# Patient Record
Sex: MALE | Race: WHITE | Employment: OTHER | RURAL
[De-identification: names, ages, dates, MRNs, and addresses within clinical notes are randomized per-mention and may not be internally consistent; named-entity substitution may affect disease eponyms.]

---

## 2017-01-18 ENCOUNTER — HOSPITAL ENCOUNTER (OUTPATIENT)
Dept: MRI IMAGING | Age: 57
Discharge: HOME | End: 2017-01-18
Attending: NURSE PRACTITIONER | Admitting: NURSE PRACTITIONER

## 2017-01-18 DIAGNOSIS — M51.36 DDD (DEGENERATIVE DISC DISEASE), LUMBAR: ICD-10-CM

## 2018-11-24 ENCOUNTER — HOSPITAL ENCOUNTER (EMERGENCY)
Facility: HOSPITAL | Age: 58
Discharge: HOME | End: 2018-11-24
Attending: EMERGENCY MEDICINE
Payer: MEDICAID

## 2018-11-24 VITALS
DIASTOLIC BLOOD PRESSURE: 73 MMHG | OXYGEN SATURATION: 94 % | HEIGHT: 63 IN | RESPIRATION RATE: 17 BRPM | BODY MASS INDEX: 39.87 KG/M2 | WEIGHT: 225 LBS | SYSTOLIC BLOOD PRESSURE: 145 MMHG | TEMPERATURE: 97.7 F | HEART RATE: 88 BPM

## 2018-11-24 DIAGNOSIS — E16.2 HYPOGLYCEMIA: Primary | ICD-10-CM

## 2018-11-24 LAB
ANION GAP SERPL CALCULATED.3IONS-SCNC: 12 MMOL/L (ref 3–16)
BASOPHILS ABSOLUTE: 0 K/UL (ref 0–0.1)
BASOPHILS RELATIVE PERCENT: 0.3 %
BUN BLDV-MCNC: 17 MG/DL (ref 6–20)
CALCIUM SERPL-MCNC: 10.5 MG/DL (ref 8.5–10.5)
CHLORIDE BLD-SCNC: 104 MMOL/L (ref 98–107)
CO2: 24 MMOL/L (ref 20–30)
CREAT SERPL-MCNC: 1.3 MG/DL (ref 0.4–1.2)
EOSINOPHILS ABSOLUTE: 0 K/UL (ref 0–0.4)
EOSINOPHILS RELATIVE PERCENT: 0.4 %
GFR AFRICAN AMERICAN: >59
GFR NON-AFRICAN AMERICAN: 57
GLUCOSE BLD-MCNC: 100 MG/DL (ref 74–106)
GLUCOSE BLD-MCNC: 57 MG/DL (ref 74–106)
GLUCOSE BLD-MCNC: 63 MG/DL (ref 74–106)
GLUCOSE BLD-MCNC: 70 MG/DL (ref 74–106)
GLUCOSE BLD-MCNC: 86 MG/DL (ref 74–106)
HCT VFR BLD CALC: 41.5 % (ref 40–54)
HEMOGLOBIN: 13.4 G/DL (ref 13–18)
IMMATURE GRANULOCYTES #: 0 K/UL
IMMATURE GRANULOCYTES %: 0.4 % (ref 0–5)
LYMPHOCYTES ABSOLUTE: 1.8 K/UL (ref 1.5–4)
LYMPHOCYTES RELATIVE PERCENT: 23.7 %
MCH RBC QN AUTO: 30.5 PG (ref 27–32)
MCHC RBC AUTO-ENTMCNC: 32.3 G/DL (ref 31–35)
MCV RBC AUTO: 94.5 FL (ref 80–100)
MONOCYTES ABSOLUTE: 0.6 K/UL (ref 0.2–0.8)
MONOCYTES RELATIVE PERCENT: 7.3 %
NEUTROPHILS ABSOLUTE: 5.1 K/UL (ref 2–7.5)
NEUTROPHILS RELATIVE PERCENT: 67.9 %
PDW BLD-RTO: 13.8 % (ref 11–16)
PERFORMED ON: ABNORMAL
PERFORMED ON: ABNORMAL
PERFORMED ON: NORMAL
PERFORMED ON: NORMAL
PLATELET # BLD: 462 K/UL (ref 150–400)
PMV BLD AUTO: 8 FL (ref 6–10)
POTASSIUM SERPL-SCNC: 4.6 MMOL/L (ref 3.4–5.1)
RBC # BLD: 4.39 M/UL (ref 4.5–6)
SODIUM BLD-SCNC: 140 MMOL/L (ref 136–145)
WBC # BLD: 7.5 K/UL (ref 4–11)

## 2018-11-24 PROCEDURE — 80048 BASIC METABOLIC PNL TOTAL CA: CPT

## 2018-11-24 PROCEDURE — 96365 THER/PROPH/DIAG IV INF INIT: CPT

## 2018-11-24 PROCEDURE — 85025 COMPLETE CBC W/AUTO DIFF WBC: CPT

## 2018-11-24 PROCEDURE — 36415 COLL VENOUS BLD VENIPUNCTURE: CPT

## 2018-11-24 PROCEDURE — 99284 EMERGENCY DEPT VISIT MOD MDM: CPT

## 2018-11-24 PROCEDURE — 2580000003 HC RX 258: Performed by: EMERGENCY MEDICINE

## 2018-11-24 RX ORDER — DEXTROSE AND SODIUM CHLORIDE 5; .9 G/100ML; G/100ML
INJECTION, SOLUTION INTRAVENOUS CONTINUOUS
Status: DISCONTINUED | OUTPATIENT
Start: 2018-11-24 | End: 2018-11-25 | Stop reason: HOSPADM

## 2018-11-24 RX ORDER — SERTRALINE HYDROCHLORIDE 100 MG/1
100 TABLET, FILM COATED ORAL DAILY
COMMUNITY
End: 2022-09-08 | Stop reason: DRUGHIGH

## 2018-11-24 RX ORDER — TIZANIDINE 4 MG/1
4 TABLET ORAL 2 TIMES DAILY PRN
Status: ON HOLD | COMMUNITY
End: 2021-02-17 | Stop reason: HOSPADM

## 2018-11-24 RX ORDER — FENOFIBRATE 160 MG/1
160 TABLET ORAL DAILY
COMMUNITY
End: 2022-09-08

## 2018-11-24 RX ORDER — QUETIAPINE FUMARATE 50 MG/1
200 TABLET, EXTENDED RELEASE ORAL NIGHTLY
Status: ON HOLD | COMMUNITY
End: 2021-02-17 | Stop reason: HOSPADM

## 2018-11-24 RX ORDER — PANTOPRAZOLE SODIUM 40 MG/1
40 GRANULE, DELAYED RELEASE ORAL
COMMUNITY
End: 2022-09-08 | Stop reason: SDUPTHER

## 2018-11-24 RX ORDER — ATORVASTATIN CALCIUM 80 MG/1
80 TABLET, FILM COATED ORAL DAILY
COMMUNITY
End: 2022-09-08 | Stop reason: SDUPTHER

## 2018-11-24 RX ORDER — LISINOPRIL 10 MG/1
10 TABLET ORAL DAILY
Status: ON HOLD | COMMUNITY
End: 2021-02-17 | Stop reason: HOSPADM

## 2018-11-24 RX ORDER — GLIPIZIDE 10 MG/1
10 TABLET ORAL
COMMUNITY
End: 2022-09-08 | Stop reason: SDUPTHER

## 2018-11-24 RX ADMIN — DEXTROSE AND SODIUM CHLORIDE: 5; 900 INJECTION, SOLUTION INTRAVENOUS at 23:04

## 2018-11-25 NOTE — ED PROVIDER NOTES
[address] [facility] [facility] [address]  eMERGENCY dEPARTMENT eNCOUnter      Pt Name: Violetta Cooper  MRN: [id number]  YOB: 1960  Date ofevaluation: 74/50/3460  Provider: Sherri Vicente MD    CHIEF COMPLAINT       Chief Complaint   Patient presents with    Hypoglycemia         HISTORY OF PRESENT ILLNESS  (Location/Symptom, Timing/Onset, Context/Setting, Quality, Duration, Modifying Factors, Severity.)   Violetta Cooper is a 62 y.o. male who presents to the emergency department with hypoglycemia. He's been feeling \"overly tired\" today and just not feeling well. His niece started checking his blood sugars and noticed they were low. They were 55, 53, 60, 53 at home. He's on Glipizide and Metformin. This is very unusual for him as he tends to run 130's-140's. Nursing notes were reviewed. REVIEW OF SYSTEMS    (2-9systems for level 4, 10 or more for level 5)   ROS:  General:  No fevers, no chills, + weakness  HEENT: No sore throat, runny nose or ear pain  Cardiovascular:  No chest pain, no palpitations  Respiratory:  No shortnessof breath, no cough, no wheezing  Gastrointestinal:  No pain, no nausea, no vomiting, no diarrhea  Musculoskeletal:  No muscle pain, no joint pain  Skin:  No rash, no easy bruising  Genitourinary:  No dysuria, nohematuria    Except as noted above theremainder of the review of systems was reviewed and negative.        PASTMEDICAL HISTORY     Past Medical History:   Diagnosis Date    [facility] ([facility]     Diabetes mellitus ([facility]     Hyperlipidemia          SURGICAL HISTORY       Past Surgical History:   Procedure Laterality Date    CHOLECYSTECTOMY           CURRENT MEDICATIONS       Previous Medications    ASPIRIN 81 MG TABLET    Take 81 mg by mouth daily    ATORVASTATIN (LIPITOR) 80 MG TABLET    Take 80 mg by mouth daily    EMPAGLIFLOZIN (JARDIANCE) 10 MG TABLET    Take 10 mg by mouth daily    FENOFIBRATE 160 MG TABLET    Take 160 mg by mouth daily

## 2018-11-25 NOTE — ED TRIAGE NOTES
Pts family states that pt has had hypoglycemia all day. Family states pt typically has high glucose levels due to uncontrolled diabetes mellitus 2. Family was concerned when pts glucose was low today.

## 2018-11-25 NOTE — ED NOTES
Discharge instructions reviewed with patient with his verbalized understanding. Patient left ambulatory refusing wheelchair. Repeat FSBS 100.      Jenelle Mercedes RN  11/24/18 6912

## 2020-03-19 ENCOUNTER — HOSPITAL ENCOUNTER (OUTPATIENT)
Dept: ULTRASOUND IMAGING | Facility: HOSPITAL | Age: 60
Discharge: HOME | End: 2020-03-19
Payer: MEDICAID

## 2020-03-19 PROCEDURE — 93925 LOWER EXTREMITY STUDY: CPT

## 2021-02-06 ENCOUNTER — HOSPITAL ENCOUNTER (INPATIENT)
Facility: HOSPITAL | Age: 61
LOS: 3 days | Discharge: NURSING, REHAB OR LONG-TERM CARE | DRG: 682 | End: 2021-02-09
Attending: EMERGENCY MEDICINE | Admitting: INTERNAL MEDICINE
Payer: MEDICAID

## 2021-02-06 ENCOUNTER — APPOINTMENT (OUTPATIENT)
Dept: CT IMAGING | Facility: HOSPITAL | Age: 61
DRG: 682 | End: 2021-02-06
Payer: MEDICAID

## 2021-02-06 DIAGNOSIS — J96.02 ACUTE RESPIRATORY FAILURE WITH HYPOXIA AND HYPERCAPNIA (HCC): Primary | ICD-10-CM

## 2021-02-06 DIAGNOSIS — G93.41 ACUTE METABOLIC ENCEPHALOPATHY: ICD-10-CM

## 2021-02-06 DIAGNOSIS — Z01.84 COVID-19 VIRUS ANTIBODY NEGATIVE: ICD-10-CM

## 2021-02-06 DIAGNOSIS — J96.01 ACUTE RESPIRATORY FAILURE WITH HYPOXIA AND HYPERCAPNIA (HCC): Primary | ICD-10-CM

## 2021-02-06 DIAGNOSIS — R74.8 ELEVATED CK: ICD-10-CM

## 2021-02-06 DIAGNOSIS — J18.9 PNEUMONIA DUE TO ORGANISM: ICD-10-CM

## 2021-02-06 PROBLEM — G93.40 ACUTE ENCEPHALOPATHY: Status: ACTIVE | Noted: 2021-02-06

## 2021-02-06 LAB
A/G RATIO: 1.3 (ref 0.8–2)
ALBUMIN SERPL-MCNC: 3.9 G/DL (ref 3.4–4.8)
ALP BLD-CCNC: 46 U/L (ref 25–100)
ALT SERPL-CCNC: 19 U/L (ref 4–36)
AMMONIA: 30 MCG/DL (ref 27–102)
ANION GAP SERPL CALCULATED.3IONS-SCNC: 9 MMOL/L (ref 3–16)
AST SERPL-CCNC: 41 U/L (ref 8–33)
BACTERIA: ABNORMAL /HPF
BASE EXCESS ARTERIAL: 3.5 MMOL/L (ref -3–3)
BASE EXCESS VENOUS: 3.4 MMOL/L (ref -3–3)
BASOPHILS ABSOLUTE: 0 K/UL (ref 0–0.1)
BASOPHILS RELATIVE PERCENT: 0.2 %
BILIRUB SERPL-MCNC: 0.4 MG/DL (ref 0.3–1.2)
BILIRUBIN URINE: ABNORMAL
BLOOD, URINE: ABNORMAL
BUN BLDV-MCNC: 23 MG/DL (ref 6–20)
CALCIUM SERPL-MCNC: 9.7 MG/DL (ref 8.5–10.5)
CHLORIDE BLD-SCNC: 98 MMOL/L (ref 98–107)
CLARITY: ABNORMAL
CO2: 31 MMOL/L (ref 20–30)
COLOR: YELLOW
CREAT SERPL-MCNC: 1.7 MG/DL (ref 0.4–1.2)
EOSINOPHILS ABSOLUTE: 0 K/UL (ref 0–0.4)
EOSINOPHILS RELATIVE PERCENT: 0.2 %
EPITHELIAL CELLS, UA: ABNORMAL /HPF (ref 0–5)
FIO2: 0.28 %
GFR AFRICAN AMERICAN: 50
GFR NON-AFRICAN AMERICAN: 41
GLOBULIN: 2.9 G/DL
GLUCOSE BLD-MCNC: 153 MG/DL (ref 74–106)
GLUCOSE BLD-MCNC: 188 MG/DL (ref 74–106)
GLUCOSE URINE: >=1000 MG/DL
HCO3 ARTERIAL: 30.6 MMOL/L (ref 22–26)
HCO3 VENOUS: 31.5 MMOL/L (ref 23–29)
HCT VFR BLD CALC: 44.2 % (ref 40–54)
HEMOGLOBIN: 13.7 G/DL (ref 13–18)
IMMATURE GRANULOCYTES #: 0 K/UL
IMMATURE GRANULOCYTES %: 0.4 % (ref 0–5)
INR BLD: 0.97 (ref 0.88–1.11)
KETONES, URINE: ABNORMAL MG/DL
LACTIC ACID: 1.8 MMOL/L (ref 0.4–2)
LEUKOCYTE ESTERASE, URINE: ABNORMAL
LYMPHOCYTES ABSOLUTE: 1.8 K/UL (ref 1.5–4)
LYMPHOCYTES RELATIVE PERCENT: 17.2 %
MCH RBC QN AUTO: 29.9 PG (ref 27–32)
MCHC RBC AUTO-ENTMCNC: 31 G/DL (ref 31–35)
MCV RBC AUTO: 96.5 FL (ref 80–100)
MICROSCOPIC EXAMINATION: YES
MONOCYTES ABSOLUTE: 1 K/UL (ref 0.2–0.8)
MONOCYTES RELATIVE PERCENT: 9.1 %
NEUTROPHILS ABSOLUTE: 7.7 K/UL (ref 2–7.5)
NEUTROPHILS RELATIVE PERCENT: 72.9 %
NITRITE, URINE: NEGATIVE
O2 SAT, ARTERIAL: 96.7 %
O2 SAT, VEN: ABNORMAL %
O2 THERAPY: ABNORMAL
O2 THERAPY: ABNORMAL
PCO2 ARTERIAL: 58.7 MMHG (ref 35–45)
PCO2, VEN: 64.8 MMHG (ref 40–50)
PDW BLD-RTO: 13.2 % (ref 11–16)
PERFORMED ON: ABNORMAL
PH ARTERIAL: 7.33 (ref 7.35–7.45)
PH UA: 5.5 (ref 5–8)
PH VENOUS: 7.31 (ref 7.35–7.45)
PLATELET # BLD: 320 K/UL (ref 150–400)
PMV BLD AUTO: 8.7 FL (ref 6–10)
PO2 ARTERIAL: 95.7 MMHG (ref 80–100)
PO2, VEN: 33.7 MMHG (ref 25–40)
POTASSIUM REFLEX MAGNESIUM: 5.3 MMOL/L (ref 3.4–5.1)
PRO-BNP: 838 PG/ML (ref 0–1800)
PROTEIN UA: 30 MG/DL
PROTHROMBIN TIME: 12.4 SEC (ref 11.6–13.8)
RAPID INFLUENZA  B AGN: NEGATIVE
RAPID INFLUENZA A AGN: NEGATIVE
RBC # BLD: 4.58 M/UL (ref 4.5–6)
RBC UA: ABNORMAL /HPF (ref 0–4)
SARS-COV-2, NAAT: NOT DETECTED
SODIUM BLD-SCNC: 138 MMOL/L (ref 136–145)
SPECIFIC GRAVITY UA: 1.02 (ref 1–1.03)
TCO2 ARTERIAL: 32.4 MMOL/L (ref 24–30)
TCO2 CALC VENOUS: 34 MMOL/L
TOTAL CK: 4029 U/L (ref 26–174)
TOTAL PROTEIN: 6.8 G/DL (ref 6.4–8.3)
TROPONIN: <0.3 NG/ML
URINE REFLEX TO CULTURE: ABNORMAL
URINE TYPE: ABNORMAL
UROBILINOGEN, URINE: 0.2 E.U./DL
VALPROIC ACID LEVEL: 10 UG/ML (ref 50–100)
WBC # BLD: 10.5 K/UL (ref 4–11)
WBC UA: ABNORMAL /HPF (ref 0–5)
YEAST: PRESENT /HPF

## 2021-02-06 PROCEDURE — 70450 CT HEAD/BRAIN W/O DYE: CPT

## 2021-02-06 PROCEDURE — 94640 AIRWAY INHALATION TREATMENT: CPT

## 2021-02-06 PROCEDURE — 81001 URINALYSIS AUTO W/SCOPE: CPT

## 2021-02-06 PROCEDURE — 87804 INFLUENZA ASSAY W/OPTIC: CPT

## 2021-02-06 PROCEDURE — 2580000003 HC RX 258: Performed by: EMERGENCY MEDICINE

## 2021-02-06 PROCEDURE — 94660 CPAP INITIATION&MGMT: CPT

## 2021-02-06 PROCEDURE — 6360000002 HC RX W HCPCS: Performed by: EMERGENCY MEDICINE

## 2021-02-06 PROCEDURE — 1200000000 HC SEMI PRIVATE

## 2021-02-06 PROCEDURE — 85610 PROTHROMBIN TIME: CPT

## 2021-02-06 PROCEDURE — 96375 TX/PRO/DX INJ NEW DRUG ADDON: CPT

## 2021-02-06 PROCEDURE — 82140 ASSAY OF AMMONIA: CPT

## 2021-02-06 PROCEDURE — 99285 EMERGENCY DEPT VISIT HI MDM: CPT

## 2021-02-06 PROCEDURE — 87040 BLOOD CULTURE FOR BACTERIA: CPT

## 2021-02-06 PROCEDURE — 85025 COMPLETE CBC W/AUTO DIFF WBC: CPT

## 2021-02-06 PROCEDURE — 82550 ASSAY OF CK (CPK): CPT

## 2021-02-06 PROCEDURE — 36600 WITHDRAWAL OF ARTERIAL BLOOD: CPT

## 2021-02-06 PROCEDURE — 6370000000 HC RX 637 (ALT 250 FOR IP): Performed by: EMERGENCY MEDICINE

## 2021-02-06 PROCEDURE — 96365 THER/PROPH/DIAG IV INF INIT: CPT

## 2021-02-06 PROCEDURE — 36415 COLL VENOUS BLD VENIPUNCTURE: CPT

## 2021-02-06 PROCEDURE — 80053 COMPREHEN METABOLIC PANEL: CPT

## 2021-02-06 PROCEDURE — 84484 ASSAY OF TROPONIN QUANT: CPT

## 2021-02-06 PROCEDURE — 6370000000 HC RX 637 (ALT 250 FOR IP): Performed by: PHYSICIAN ASSISTANT

## 2021-02-06 PROCEDURE — 6360000002 HC RX W HCPCS: Performed by: PHYSICIAN ASSISTANT

## 2021-02-06 PROCEDURE — 74176 CT ABD & PELVIS W/O CONTRAST: CPT

## 2021-02-06 PROCEDURE — 71250 CT THORAX DX C-: CPT

## 2021-02-06 PROCEDURE — 82803 BLOOD GASES ANY COMBINATION: CPT

## 2021-02-06 PROCEDURE — 94761 N-INVAS EAR/PLS OXIMETRY MLT: CPT

## 2021-02-06 PROCEDURE — U0002 COVID-19 LAB TEST NON-CDC: HCPCS

## 2021-02-06 PROCEDURE — 80164 ASSAY DIPROPYLACETIC ACD TOT: CPT

## 2021-02-06 PROCEDURE — 83880 ASSAY OF NATRIURETIC PEPTIDE: CPT

## 2021-02-06 PROCEDURE — 93005 ELECTROCARDIOGRAM TRACING: CPT

## 2021-02-06 PROCEDURE — 83605 ASSAY OF LACTIC ACID: CPT

## 2021-02-06 RX ORDER — IPRATROPIUM BROMIDE AND ALBUTEROL SULFATE 2.5; .5 MG/3ML; MG/3ML
1 SOLUTION RESPIRATORY (INHALATION)
Status: DISCONTINUED | OUTPATIENT
Start: 2021-02-06 | End: 2021-02-09 | Stop reason: HOSPADM

## 2021-02-06 RX ORDER — IPRATROPIUM BROMIDE AND ALBUTEROL SULFATE 2.5; .5 MG/3ML; MG/3ML
1 SOLUTION RESPIRATORY (INHALATION) ONCE
Status: COMPLETED | OUTPATIENT
Start: 2021-02-06 | End: 2021-02-06

## 2021-02-06 RX ORDER — ACETAMINOPHEN 650 MG/1
650 SUPPOSITORY RECTAL EVERY 6 HOURS PRN
Status: DISCONTINUED | OUTPATIENT
Start: 2021-02-06 | End: 2021-02-09 | Stop reason: HOSPADM

## 2021-02-06 RX ORDER — ATORVASTATIN CALCIUM 80 MG/1
80 TABLET, FILM COATED ORAL DAILY
Status: DISCONTINUED | OUTPATIENT
Start: 2021-02-06 | End: 2021-02-09 | Stop reason: HOSPADM

## 2021-02-06 RX ORDER — PROPRANOLOL HYDROCHLORIDE 40 MG/1
60 TABLET ORAL 2 TIMES DAILY
Status: ON HOLD | COMMUNITY
End: 2021-02-17 | Stop reason: HOSPADM

## 2021-02-06 RX ORDER — GUAIFENESIN 600 MG/1
1200 TABLET, EXTENDED RELEASE ORAL 2 TIMES DAILY
Status: ON HOLD | COMMUNITY
End: 2021-02-17 | Stop reason: HOSPADM

## 2021-02-06 RX ORDER — SUCRALFATE 1 G/1
1 TABLET ORAL 3 TIMES DAILY
Status: DISCONTINUED | OUTPATIENT
Start: 2021-02-06 | End: 2021-02-09 | Stop reason: HOSPADM

## 2021-02-06 RX ORDER — SUCRALFATE 1 G/1
1 TABLET ORAL 3 TIMES DAILY
COMMUNITY
End: 2022-09-08 | Stop reason: SDUPTHER

## 2021-02-06 RX ORDER — AZITHROMYCIN 250 MG/1
250 TABLET, FILM COATED ORAL DAILY
Status: DISCONTINUED | OUTPATIENT
Start: 2021-02-07 | End: 2021-02-09 | Stop reason: HOSPADM

## 2021-02-06 RX ORDER — PROMETHAZINE HYDROCHLORIDE 25 MG/1
12.5 TABLET ORAL EVERY 6 HOURS PRN
Status: DISCONTINUED | OUTPATIENT
Start: 2021-02-06 | End: 2021-02-09 | Stop reason: HOSPADM

## 2021-02-06 RX ORDER — RISPERIDONE 0.5 MG/1
0.5 TABLET, FILM COATED ORAL 2 TIMES DAILY
Status: ON HOLD | COMMUNITY
End: 2021-02-17 | Stop reason: HOSPADM

## 2021-02-06 RX ORDER — DIVALPROEX SODIUM 250 MG/1
250 TABLET, EXTENDED RELEASE ORAL NIGHTLY
COMMUNITY
End: 2022-09-08 | Stop reason: SDUPTHER

## 2021-02-06 RX ORDER — ACETAMINOPHEN 325 MG/1
650 TABLET ORAL EVERY 6 HOURS PRN
Status: DISCONTINUED | OUTPATIENT
Start: 2021-02-06 | End: 2021-02-09 | Stop reason: HOSPADM

## 2021-02-06 RX ORDER — ASPIRIN 81 MG/1
81 TABLET, CHEWABLE ORAL DAILY
Status: DISCONTINUED | OUTPATIENT
Start: 2021-02-06 | End: 2021-02-09 | Stop reason: HOSPADM

## 2021-02-06 RX ORDER — ONDANSETRON 2 MG/ML
4 INJECTION INTRAMUSCULAR; INTRAVENOUS EVERY 6 HOURS PRN
Status: DISCONTINUED | OUTPATIENT
Start: 2021-02-06 | End: 2021-02-09 | Stop reason: HOSPADM

## 2021-02-06 RX ORDER — PROPRANOLOL HYDROCHLORIDE 20 MG/1
60 TABLET ORAL 2 TIMES DAILY
Status: DISCONTINUED | OUTPATIENT
Start: 2021-02-06 | End: 2021-02-09 | Stop reason: HOSPADM

## 2021-02-06 RX ORDER — POLYETHYLENE GLYCOL 3350 17 G/17G
17 POWDER, FOR SOLUTION ORAL DAILY PRN
Status: DISCONTINUED | OUTPATIENT
Start: 2021-02-06 | End: 2021-02-09 | Stop reason: HOSPADM

## 2021-02-06 RX ORDER — GUAIFENESIN 600 MG/1
1200 TABLET, EXTENDED RELEASE ORAL 2 TIMES DAILY
Status: DISCONTINUED | OUTPATIENT
Start: 2021-02-06 | End: 2021-02-09 | Stop reason: HOSPADM

## 2021-02-06 RX ORDER — DIVALPROEX SODIUM 250 MG/1
250 TABLET, EXTENDED RELEASE ORAL NIGHTLY
Status: DISCONTINUED | OUTPATIENT
Start: 2021-02-06 | End: 2021-02-09 | Stop reason: HOSPADM

## 2021-02-06 RX ORDER — 0.9 % SODIUM CHLORIDE 0.9 %
1000 INTRAVENOUS SOLUTION INTRAVENOUS ONCE
Status: COMPLETED | OUTPATIENT
Start: 2021-02-06 | End: 2021-02-06

## 2021-02-06 RX ORDER — METHYLPREDNISOLONE SODIUM SUCCINATE 125 MG/2ML
125 INJECTION, POWDER, LYOPHILIZED, FOR SOLUTION INTRAMUSCULAR; INTRAVENOUS ONCE
Status: COMPLETED | OUTPATIENT
Start: 2021-02-06 | End: 2021-02-06

## 2021-02-06 RX ORDER — DIAZEPAM 10 MG/1
10 TABLET ORAL EVERY 12 HOURS PRN
Status: ON HOLD | COMMUNITY
End: 2021-02-17 | Stop reason: HOSPADM

## 2021-02-06 RX ORDER — PANTOPRAZOLE SODIUM 40 MG/1
40 TABLET, DELAYED RELEASE ORAL
Status: DISCONTINUED | OUTPATIENT
Start: 2021-02-07 | End: 2021-02-09 | Stop reason: HOSPADM

## 2021-02-06 RX ORDER — TIZANIDINE 4 MG/1
4 TABLET ORAL 2 TIMES DAILY PRN
Status: DISCONTINUED | OUTPATIENT
Start: 2021-02-06 | End: 2021-02-09 | Stop reason: HOSPADM

## 2021-02-06 RX ADMIN — AZITHROMYCIN DIHYDRATE 500 MG: 500 INJECTION, POWDER, LYOPHILIZED, FOR SOLUTION INTRAVENOUS at 14:55

## 2021-02-06 RX ADMIN — IPRATROPIUM BROMIDE AND ALBUTEROL SULFATE 1 AMPULE: .5; 3 SOLUTION RESPIRATORY (INHALATION) at 14:11

## 2021-02-06 RX ADMIN — METHYLPREDNISOLONE SODIUM SUCCINATE 125 MG: 125 INJECTION, POWDER, FOR SOLUTION INTRAMUSCULAR; INTRAVENOUS at 14:19

## 2021-02-06 RX ADMIN — IPRATROPIUM BROMIDE AND ALBUTEROL SULFATE 1 AMPULE: .5; 3 SOLUTION RESPIRATORY (INHALATION) at 16:47

## 2021-02-06 RX ADMIN — CEFTRIAXONE 1000 MG: 1 INJECTION, POWDER, FOR SOLUTION INTRAMUSCULAR; INTRAVENOUS at 14:20

## 2021-02-06 RX ADMIN — ENOXAPARIN SODIUM 40 MG: 100 INJECTION SUBCUTANEOUS at 17:45

## 2021-02-06 RX ADMIN — SODIUM CHLORIDE 1000 ML: 9 INJECTION, SOLUTION INTRAVENOUS at 14:19

## 2021-02-06 RX ADMIN — IPRATROPIUM BROMIDE AND ALBUTEROL SULFATE 1 AMPULE: .5; 3 SOLUTION RESPIRATORY (INHALATION) at 21:00

## 2021-02-06 NOTE — ED NOTES
md at bedside at this time. Attempting urine sample. Pt does have iv access from ems. He states his name and birthday. He does think he is at [facility].  He does not know month or year.      Pierre Quintana RN  02/06/21 6140

## 2021-02-06 NOTE — ED PROVIDER NOTES
[address] [facility] ENCOUNTER      Pt Name: Daryl Fitzgerald  MRN: [id number]  [place] 1960  Date of evaluation: 2/6/2021  Provider: Sarah Feng DO    CHIEF COMPLAINT       Chief Complaint   Patient presents with    Altered Mental Status         HISTORY OF PRESENT ILLNESS   (Location/Symptom, Timing/Onset, Context/Setting, Quality, Duration, Modifying Factors, Severity)  Note limiting factors. Daryl Fitzgerald is a 61 y.o. male with a history of diabetes, hypertension, unknown cancer who presents to the emergency department with complaint of altered mental status and hypoxia. Patient is coming from home. Reportedly family called and states sometime yesterday he began acting altered and more lethargic than normal.  Today was sitting in feces and his own urine. No report of any trauma. Unsure how long he had been sitting there. History is limited as no family is here and was given by EMS. Unknown time of onset or last known well time. Patient is confused at this time and is A&O x2. He is unsure why he is here. He denies any complaints. Blood sugar was reportedly 400s at home but around 260 for EMS. He was also hypoxic to around 90% on room air and placed on 5 L nasal cannula with improvement. No focal deficits were reported. The last time he was here was in 2018 for hypoglycemia. Appropriate PPE was used including an eye shield, gloves, N95 mask, surgical mask during the entire patient encounter and exam.  If necessary (pt being intubated or aerosolizing equipment in use) a gown was also used. Nursing Notes were reviewed.       PAST MEDICAL HISTORY     Past Medical History:   Diagnosis Date    Cancer ([facility]     Diabetes mellitus ([facility]     Hyperlipidemia          SURGICAL HISTORY       Past Surgical History:   Procedure Laterality Date    CHOLECYSTECTOMY           CURRENT MEDICATIONS       Previous Medications    ASPIRIN 81 MG TABLET Take 81 mg by mouth daily    ATORVASTATIN (LIPITOR) 80 MG TABLET    Take 80 mg by mouth daily    DIAZEPAM (VALIUM) 10 MG TABLET    Take 10 mg by mouth every 12 hours as needed for Anxiety. DIVALPROEX (DEPAKOTE ER) 250 MG EXTENDED RELEASE TABLET    Take 250 mg by mouth nightly    EMPAGLIFLOZIN (JARDIANCE) 10 MG TABLET    Take 10 mg by mouth daily    FENOFIBRATE 160 MG TABLET    Take 160 mg by mouth daily    GLIPIZIDE (GLUCOTROL) 10 MG TABLET    Take 10 mg by mouth 2 times daily (before meals)    GUAIFENESIN (MUCINEX) 600 MG EXTENDED RELEASE TABLET    Take 1,200 mg by mouth 2 times daily    LINACLOTIDE (LINZESS) 72 MCG CAPS CAPSULE    Take 72 mcg by mouth every morning (before breakfast)    LISINOPRIL (PRINIVIL;ZESTRIL) 10 MG TABLET    Take 10 mg by mouth daily    METFORMIN (GLUCOPHAGE) 1000 MG TABLET    Take 1,000 mg by mouth 2 times daily (with meals)    PANTOPRAZOLE SODIUM (PROTONIX) 40 MG PACK PACKET    Take 40 mg by mouth every morning (before breakfast)    PROPRANOLOL (INDERAL) 40 MG TABLET    Take 60 mg by mouth 2 times daily    QUETIAPINE (SEROQUEL XR) 50 MG EXTENDED RELEASE TABLET    Take 200 mg by mouth nightly     RISPERIDONE (RISPERDAL) 0.5 MG TABLET    Take 0.5 mg by mouth 2 times daily    SERTRALINE (ZOLOFT) 100 MG TABLET    Take 100 mg by mouth daily    SITAGLIPTIN (JANUVIA) 100 MG TABLET    Take 100 mg by mouth daily    SUCRALFATE (CARAFATE) 1 GM TABLET    Take 1 g by mouth 3 times daily    TIZANIDINE (ZANAFLEX) 4 MG TABLET    Take 4 mg by mouth 2 times daily as needed        ALLERGIES     Patient has no known allergies. FAMILY HISTORY     History reviewed. No pertinent family history.        SOCIAL HISTORY       Social History     Socioeconomic History    Marital status: Single     Spouse name: None    Number of children: None    Years of education: None    Highest education level: None   Occupational History    None   Social Needs    Financial resource strain: None    Food insecurity Worry: None     Inability: None    Transportation needs     Medical: None     Non-medical: None   Tobacco Use    Smoking status: Current Every Day Smoker     Packs/day: 2.00     Years: 25.00     Pack years: 50.00     Types: Cigarettes    Smokeless tobacco: Former User   Substance and Sexual Activity    Alcohol use: No    Drug use: No    Sexual activity: Not Currently     Partners: Female   Lifestyle    Physical activity     Days per week: None     Minutes per session: None    Stress: None   Relationships    Social connections     Talks on phone: None     Gets together: None     Attends [employer] service: None     Active member of club or organization: None     Attends meetings of clubs or organizations: None     Relationship status: None    Intimate partner violence     Fear of current or ex partner: None     Emotionally abused: None     Physically abused: None     Forced sexual activity: None   Other Topics Concern    None   Social History Narrative    None       SCREENINGS    [place] Coma Scale  Eye Opening: To speech  Best Verbal Response: Confused  Best Motor Response: Obeys commands  [place] Coma Scale Score: 13          Review of Systems  Unable to assess    Except as noted above the remainder of the review of systems was reviewed and negative.        PHYSICAL EXAM    (up to 7 for level 4, 8 or more for level 5)     ED Triage Vitals [02/06/21 1230]   BP Temp Temp Source Pulse Resp SpO2 Height Weight   (!) 122/54 97.1 °F (36.2 °C) Temporal 94 25 100 % 5' 3\" (1.6 m) 225 lb (102.1 kg)       General appearance: Alert, lethargic, easily arousable, responds to verbal questions  HENT: normocephalic, atraumatic, oropharynx moist, nares patent  Eyes: PERRLA, EOMI, conjunctiva normal  Neck: normal range of motion, no tenderness, trachea midline, no stridor  Respiratory: Crackles bilaterally, no accessory muscle use  Cardiovascular: normal heart rate, normal rhythm  GI: Distended, mild tenderness to palpation, normal bowel sounds, no pulsatile masses  : Swollen erythematous scrotum, no crepitus, no involvement of the perineum, no tenderness to palpation  Musculoskeletal: 1+ pitting edema bilaterally, 1+ DP pulses bilaterally, 2+ radial pulses bilaterally, no clubbing, cyanosis. Good range of motion  Back: no tenderness  Integument: warm, dry, no erythema, no rash, < 2 second cap refill  Neurologic: Alert and oriented x1-2. Oriented to person and knew he was at a hospital but stated the wrong hospital [facility]), disoriented to time, able to move all extremities, no facial droop, nose gaze deficit, no facial asymmetry, he does have slurred speech    DIAGNOSTIC RESULTS     EKG: Normal sinus rhythm, rate 92, normal QRS, prolonged QT, no ST depression or elevation, normal T wave, artifact noted    All EKG's are interpreted by the Emergency Department Physician who either signs or Co-signs this chart in the absence of a cardiologist.      RADIOLOGY:   Interpretation per the Radiologist below, if available at the time of this note:    CT CHEST WO CONTRAST   Final Result      Patchy parenchymal infiltrates seen in left lower lobe and lingular segment of left upper lobe suspicious for pneumonia. 1.1 cm pleural-based nodular density seen laterally in superior segment of left upper lobe. Following the Fleischner Society 2017 guidelines for single solid nodules >8 mm, if patient is low risk then consider CT at 3 months, PET/CT, or tissue sampling. If patient is high risk, then consider CT at 3 months, PET/CT, or tissue sampling.  qzxv      Diffuse tracheal narrowing may indicate tracheomalacia. Partial compression of L1 vertebral body of questionable significance may indicate age-indeterminate fracture. Coronary calcification. Otherwise no acute process identified given limitations of noncontrast evaluation.       CT ABDOMEN PELVIS WO CONTRAST Additional Contrast? None   Final Result      Patchy parenchymal infiltrates seen in left lower lobe and lingular segment of left upper lobe suspicious for pneumonia. 1.1 cm pleural-based nodular density seen laterally in superior segment of left upper lobe. Following the Fleischner Society 2017 guidelines for single solid nodules >8 mm, if patient is low risk then consider CT at 3 months, PET/CT, or tissue sampling. If patient is high risk, then consider CT at 3 months, PET/CT, or tissue sampling.  qzxv      Diffuse tracheal narrowing may indicate tracheomalacia. Partial compression of L1 vertebral body of questionable significance may indicate age-indeterminate fracture. Coronary calcification. Otherwise no acute process identified given limitations of noncontrast evaluation. CT Head WO Contrast   Final Result      No evidence of acute intracranial abnormality.                            LABS:  Labs Reviewed   CBC WITH AUTO DIFFERENTIAL - Abnormal; Notable for the following components:       Result Value    Neutrophils Absolute 7.7 (*)     Monocytes Absolute 1.0 (*)     All other components within normal limits    Narrative:     Performed at:  [address] [place] [address] and [facility]  [address] [place] [address]  [place], [place] [address]   Phone [phone]   COMPREHENSIVE METABOLIC PANEL W/ REFLEX TO MG FOR LOW K - Abnormal; Notable for the following components:    Potassium reflex Magnesium 5.3 (*)     CO2 31 (*)     Glucose 153 (*)     BUN 23 (*)     CREATININE 1.7 (*)     GFR Non- 41 (*)     GFR  50 (*)     AST 41 (*)     All other components within normal limits    Narrative:     Performed at:  [address] [place] [address] and [address],  [place], [place] [address]   Phone [phone]   URINE RT REFLEX TO CULTURE - Abnormal; Notable for the following components:    Glucose, Ur >=1000 (*)     Bilirubin Urine SMALL (*)     Ketones, Urine TRACE (*)     Blood, Urine LARGE (*)     Protein, UA 30 (*)     Leukocyte Esterase, Urine TRACE (*)     All other components within normal limits    Narrative:     Performed at:  [address] [place] [address] Laboratory  [address] [place], [place] [address], [place] [address]   Phone [phone]   BLOOD GAS, VENOUS - Abnormal; Notable for the following components:    pH, Randal 7.31 (*)     pCO2, Randal 64.8 (*)     HCO3, Venous 31.5 (*)     Base Excess, Randal 3.4 (*)     O2 Sat, Randal ----- (*)     All other components within normal limits    Narrative:     Performed at:  [address] [place], [place] [address] [address]   Phone [phone]   CK - Abnormal; Notable for the following components:     Total CK 4,029 (*)     All other components within normal limits    Narrative:     Performed at:  [address] [place] [address] Laboratory  [address] [place], [place] [address], [place] [address]   Phone [phone]   MICROSCOPIC URINALYSIS - Abnormal; Notable for the following components:    RBC, UA 11-20 (*)     Bacteria, UA Rare (*)     Yeast, UA Present (*)     All other components within normal limits    Narrative:     Performed at:  [address] [place] [address] Laboratory  [address] [place], [place] [address], [place] [address]   Phone [phone]   BLOOD GAS, ARTERIAL - Abnormal; Notable for the following components:    pH, Arterial 7.335 (*)     pCO2, Arterial 58.7 (*)     HCO3, Arterial 30.6 (*)     Base Excess, Arterial 3.5 (*)     TCO2, Arterial 32.4 (*)     All other components within normal limits    Narrative:     Performed at:  [address] [place] [address] Laboratory  [address] [place], [place] [address], [place] [address]   Phone [phone]   POCT GLUCOSE - Abnormal; Notable for the following components:    POC Glucose 188 (*)     All other components within normal limits    Narrative:     Performed at:  [address] [place] [address] Laboratory  [address] [place] [address], [place], [place] [address]   Phone [phone]   CULTURE, BLOOD 1   CULTURE, BLOOD 2   RAPID INFLUENZA A/B ANTIGENS   COVID-19    Narrative:     Performed at:  [facility]  [address] [place] [address],  [place], [place] [address]   Phone [phone]   PROTIME-INR    Narrative:     Performed at:  [facility]  [address] [place] [address],  [place], [place] [address]   Phone [phone]   BRAIN NATRIURETIC PEPTIDE    Narrative:     Performed at:  [address] [place] [address] Laboratory  [address] [place] [address],  [place], [place] [address]   Phone [phone]   TROPONIN    Narrative:     Performed at:  [address] [place] [address] Laboratory  [address] [place] [address],  [place], [place] [address]   Phone [phone]   LACTIC ACID, PLASMA    Narrative:     Performed at:  [facility]  [address] [place] [address],  [place], [place] [address]   Phone [phone]   AMMONIA    Narrative:     Performed at:  [address] [place] [address] Laboratory  [address] [place] [address],  [place], [place] [address]   Phone [phone]   VALPROIC ACID LEVEL, TOTAL   URINE DRUG SCREEN   ETHANOL       All other labs were within normal range or not returned as of this dictation. EMERGENCY DEPARTMENT COURSE and DIFFERENTIAL DIAGNOSIS/MDM:   Vitals:    Vitals:    02/06/21 1321 02/06/21 1322 02/06/21 1350 02/06/21 1428   BP: 130/66 130/66 122/60    Pulse: 96 96 95    Resp: 18 17 19 17   Temp:       TempSrc:       SpO2: 99% 99% 99%    Weight:       Height:             MDM  55-year-old male presents the emergency department with unknown complications. He is more lethargic than normal.  Unknown last well time. No code stroke was called as we cannot determine a last known well time. He has no obvious focal neurological abnormalities but does appear dysarthric. His vital signs showed mild hypoxia on room air for EMS so brought in on oxygen.   He is afebrile. Physical exam as above. He is some crackles bilaterally. Elevated blood sugars at home. Will obtain septic work-up, CT head, chest abdomen and pelvis. I was able to speak with patient's niece Luz Elena Lee who lives with the patient. They report that the patient's had increasing memory problems and confusion over the past 2 to 3 months. Up until 6 days ago is still able to perform most of his activities of daily living. For the past 6 days he has been more confused, more lethargic, sleeping more than normal.  Also reports elevated blood sugar levels in the 400s. Has been calling the primary care physician and giving extra doses of insulin at home. Work-up shows ABG with pH of 7.33 and PCO2 of 58.7. Patient placed on BiPAP. CBC without leukocytosis. Hemoglobin stable. CMP shows a creatinine of 1.7 with previous creatinine from 2018 at 1.3. Potassium 5.3. CK elevated around 4000. Patient given 1 L of IV fluids. CT head without acute abnormality. CT chest abdomen pelvis shows left-sided infiltrates as well as a pulmonary nodule. Patient given Rocephin and azithromycin. Lactic acid within normal limits. Does not need 30 cc/kg bolus. Discussed with hospitalist Renu who accepts admission. Patient will be admitted for acute hypoxic respiratory failure with hypercapnia. ED crit    CRITICAL CARE:  The high probability of sudden, clinically significant deterioration in the patient's condition required the highest level of my preparedness to intervene urgently. The services I provided to this patient were to treat and/or prevent clinically significant deterioration that could result in:respiratory collapse.   Services included the following: chart data review, reviewing nursing notes and/or old charts, documentation time, consultant collaboration regarding findings and treatment options, medication orders and management, direct patient care, re-evaluations, vital sign assessments and ordering, interpreting and reviewing diagnostic studies/lab tests. Aggregate critical care time was 35 minutes, which includes only time during which I was engaged in work directly related to the patient's care, as described above, whether at the bedside or elsewhere in the Emergency Department. It did not include time spent performing other reported procedures or the services of residents, students, nurses or physician assistants. CONSULTS:  None      FINAL IMPRESSION      1. Acute respiratory failure with hypoxia and hypercapnia (HCC)    2. COVID-19 virus antibody negative    3. Pneumonia due to organism    4. Elevated CK    5. Acute metabolic encephalopathy          DISPOSITION/PLAN   DISPOSITION Decision To Admit 02/06/2021 02:39:25 PM      PATIENT REFERRED TO:  No follow-up provider specified.     DISCHARGE MEDICATIONS:  New Prescriptions    No medications on file          (Please note that portions of this note were completed with a voice recognition program.  Efforts were made to edit the dictations but occasionally words are mis-transcribed.)    Dara Salinas DO (electronically signed)  Attending Emergency Physician      Dara Salinas,   02/06/21 401 [facility] 67, DO  02/06/21 [facility]0

## 2021-02-06 NOTE — ED NOTES
Pt transferred to the floor by me. Transferred to floor  Bed with assistance. Iv abx placed on floor pump for infusion. Pt tolerated transfer well.      Derian Lewis RN  02/06/21 4471

## 2021-02-06 NOTE — ED NOTES
Pt brought in by [facility] for altered mental statussince yesterday. Pt is not answering questions. Pupils = pt is leaning left  bg running high at home per family 400. En route with ems poc 253  Per family, pt has been sitting in the same spot since yesterday. His clothes are soiled with stool and urine.   97.3, hr 3, sr 123/77 97% 5l n/c initial sat 92% room air     Kimberly Saravia RN  02/06/21 516 [facility]  02/06/21 6623

## 2021-02-06 NOTE — ED NOTES
Pt arrived in sweat pants and a a laci shirt. Pants removed, pt laci shirt was cut off. Pt cleaned, his clothing was soaked in urine and feces. Redness noted to pt groin area, crusted stool to buttocks. Pt also had an area of redness, to left upper back at axilla area with blister noted. He also had redness to his left neck, with \"hive\" like welts. md is aware.   Pt repositioned on transfer mat, leeann delgado RN  02/06/21 3119

## 2021-02-06 NOTE — ED NOTES
Pt currently on bipap, iv abx infusing.   He will be transported to room 16 via [address] [place] [address] Street, RN  02/06/21 1500

## 2021-02-06 NOTE — ED NOTES
Attempted straight cath for urine collection due to patient unable to give clean specimen. Unable to insert / advance due to swollen / red / excoriated scrotum. Urine bag placed at this time. Small amount of urine collected and sent to lab. Patient tolerated well.      Pino Linder RN  02/06/21 0538

## 2021-02-06 NOTE — PROGRESS NOTES
PHARMACY NOTE  Dearl Cornelia was ordered Linaclotide 72 mcg. Per the [address], this strength is non-formulary and not stocked by pharmacy. Patient may supply his own or the medication may be discontinued and other agents ordered during the hospital stay. The medication can be reordered at discharge.    MARTIN Cazares.Ph.2/6/20213:35 PM

## 2021-02-06 NOTE — ED NOTES
md did speak with pt venancio Marks and advised her of plan to admit pt to the floor     Kimberly Saravia RN  02/06/21 9015

## 2021-02-06 NOTE — ED NOTES
I did call to lab, spoke with md zully added uds and ethanol and a flu swab. Per md ok to obtain flu swab. Zachary Cevallos advises we will need another green tube and more urine. Per md ok to hold these at this time.        Radha Guevara RN  02/06/21 3970

## 2021-02-07 ENCOUNTER — APPOINTMENT (OUTPATIENT)
Dept: CT IMAGING | Facility: HOSPITAL | Age: 61
DRG: 682 | End: 2021-02-07
Payer: MEDICAID

## 2021-02-07 ENCOUNTER — APPOINTMENT (OUTPATIENT)
Dept: GENERAL RADIOLOGY | Facility: HOSPITAL | Age: 61
DRG: 682 | End: 2021-02-07
Payer: MEDICAID

## 2021-02-07 PROBLEM — S12.090A COMPRESSION FRACTURE OF FIRST CERVICAL VERTEBRA (HCC): Status: RESOLVED | Noted: 2021-02-07 | Resolved: 2021-02-07

## 2021-02-07 PROBLEM — N39.0 UTI (URINARY TRACT INFECTION): Status: ACTIVE | Noted: 2021-02-07

## 2021-02-07 PROBLEM — S32.010A COMPRESSION FRACTURE OF L1 LUMBAR VERTEBRA (HCC): Status: ACTIVE | Noted: 2021-02-07

## 2021-02-07 PROBLEM — E66.9 OBESITY: Status: ACTIVE | Noted: 2021-02-07

## 2021-02-07 PROBLEM — J96.02 ACUTE RESPIRATORY FAILURE WITH HYPERCAPNIA (HCC): Status: ACTIVE | Noted: 2021-02-07

## 2021-02-07 PROBLEM — Z72.0 TOBACCO ABUSE: Status: ACTIVE | Noted: 2021-02-07

## 2021-02-07 PROBLEM — E11.9 TYPE 2 DIABETES MELLITUS (HCC): Status: ACTIVE | Noted: 2021-02-07

## 2021-02-07 PROBLEM — I10 HYPERTENSION: Status: ACTIVE | Noted: 2021-02-07

## 2021-02-07 PROBLEM — S12.090A COMPRESSION FRACTURE OF FIRST CERVICAL VERTEBRA (HCC): Status: ACTIVE | Noted: 2021-02-07

## 2021-02-07 PROBLEM — R91.1 PULMONARY NODULE: Status: ACTIVE | Noted: 2021-02-07

## 2021-02-07 PROBLEM — E87.5 HYPERKALEMIA: Status: ACTIVE | Noted: 2021-02-07

## 2021-02-07 PROBLEM — J18.9 PNEUMONIA: Status: ACTIVE | Noted: 2021-02-07

## 2021-02-07 PROBLEM — M25.512 LEFT SHOULDER PAIN: Status: ACTIVE | Noted: 2021-02-07

## 2021-02-07 PROBLEM — F41.9 ANXIETY: Status: ACTIVE | Noted: 2021-02-07

## 2021-02-07 PROBLEM — R53.1 GENERALIZED WEAKNESS: Status: ACTIVE | Noted: 2021-02-07

## 2021-02-07 PROBLEM — N17.9 AKI (ACUTE KIDNEY INJURY) (HCC): Status: ACTIVE | Noted: 2021-02-07

## 2021-02-07 LAB
A/G RATIO: 1.2 (ref 0.8–2)
ALBUMIN SERPL-MCNC: 3.5 G/DL (ref 3.4–4.8)
ALP BLD-CCNC: 54 U/L (ref 25–100)
ALT SERPL-CCNC: 24 U/L (ref 4–36)
ANION GAP SERPL CALCULATED.3IONS-SCNC: 11 MMOL/L (ref 3–16)
AST SERPL-CCNC: 39 U/L (ref 8–33)
BASE EXCESS ARTERIAL: 1.1 MMOL/L (ref -3–3)
BILIRUB SERPL-MCNC: 0.3 MG/DL (ref 0.3–1.2)
BUN BLDV-MCNC: 26 MG/DL (ref 6–20)
CALCIUM SERPL-MCNC: 9.3 MG/DL (ref 8.5–10.5)
CHLORIDE BLD-SCNC: 101 MMOL/L (ref 98–107)
CO2: 27 MMOL/L (ref 20–30)
CREAT SERPL-MCNC: 1.2 MG/DL (ref 0.4–1.2)
FIO2: 0.36 %
GFR AFRICAN AMERICAN: >59
GFR NON-AFRICAN AMERICAN: >59
GLOBULIN: 3 G/DL
GLUCOSE BLD-MCNC: 170 MG/DL (ref 74–106)
HBA1C MFR BLD: 8.5 %
HCO3 ARTERIAL: 28.3 MMOL/L (ref 22–26)
HCT VFR BLD CALC: 41.9 % (ref 40–54)
HEMOGLOBIN: 13 G/DL (ref 13–18)
MCH RBC QN AUTO: 30.4 PG (ref 27–32)
MCHC RBC AUTO-ENTMCNC: 31 G/DL (ref 31–35)
MCV RBC AUTO: 98.1 FL (ref 80–100)
O2 SAT, ARTERIAL: 98.8 %
O2 THERAPY: ABNORMAL
PCO2 ARTERIAL: 57.3 MMHG (ref 35–45)
PDW BLD-RTO: 12.8 % (ref 11–16)
PH ARTERIAL: 7.31 (ref 7.35–7.45)
PLATELET # BLD: 309 K/UL (ref 150–400)
PMV BLD AUTO: 8.7 FL (ref 6–10)
PO2 ARTERIAL: 139.9 MMHG (ref 80–100)
POTASSIUM REFLEX MAGNESIUM: 5.1 MMOL/L (ref 3.4–5.1)
RBC # BLD: 4.27 M/UL (ref 4.5–6)
SODIUM BLD-SCNC: 139 MMOL/L (ref 136–145)
TCO2 ARTERIAL: 30.1 MMOL/L (ref 24–30)
TOTAL PROTEIN: 6.5 G/DL (ref 6.4–8.3)
WBC # BLD: 7.7 K/UL (ref 4–11)

## 2021-02-07 PROCEDURE — 36600 WITHDRAWAL OF ARTERIAL BLOOD: CPT

## 2021-02-07 PROCEDURE — 94660 CPAP INITIATION&MGMT: CPT

## 2021-02-07 PROCEDURE — 99223 1ST HOSP IP/OBS HIGH 75: CPT | Performed by: INTERNAL MEDICINE

## 2021-02-07 PROCEDURE — 94669 MECHANICAL CHEST WALL OSCILL: CPT

## 2021-02-07 PROCEDURE — 1200000000 HC SEMI PRIVATE

## 2021-02-07 PROCEDURE — 83036 HEMOGLOBIN GLYCOSYLATED A1C: CPT

## 2021-02-07 PROCEDURE — 6370000000 HC RX 637 (ALT 250 FOR IP): Performed by: PHYSICIAN ASSISTANT

## 2021-02-07 PROCEDURE — 80053 COMPREHEN METABOLIC PANEL: CPT

## 2021-02-07 PROCEDURE — 6360000002 HC RX W HCPCS: Performed by: PHYSICIAN ASSISTANT

## 2021-02-07 PROCEDURE — 2580000003 HC RX 258: Performed by: PHYSICIAN ASSISTANT

## 2021-02-07 PROCEDURE — 72131 CT LUMBAR SPINE W/O DYE: CPT

## 2021-02-07 PROCEDURE — 85027 COMPLETE CBC AUTOMATED: CPT

## 2021-02-07 PROCEDURE — 94640 AIRWAY INHALATION TREATMENT: CPT

## 2021-02-07 PROCEDURE — 36415 COLL VENOUS BLD VENIPUNCTURE: CPT

## 2021-02-07 PROCEDURE — 94761 N-INVAS EAR/PLS OXIMETRY MLT: CPT

## 2021-02-07 PROCEDURE — 82803 BLOOD GASES ANY COMBINATION: CPT

## 2021-02-07 PROCEDURE — 2700000000 HC OXYGEN THERAPY PER DAY

## 2021-02-07 PROCEDURE — 73030 X-RAY EXAM OF SHOULDER: CPT

## 2021-02-07 RX ORDER — GLIPIZIDE 10 MG/1
10 TABLET ORAL
Status: DISCONTINUED | OUTPATIENT
Start: 2021-02-07 | End: 2021-02-09 | Stop reason: HOSPADM

## 2021-02-07 RX ORDER — NICOTINE 21 MG/24HR
1 PATCH, TRANSDERMAL 24 HOURS TRANSDERMAL DAILY
Status: DISCONTINUED | OUTPATIENT
Start: 2021-02-07 | End: 2021-02-09 | Stop reason: HOSPADM

## 2021-02-07 RX ORDER — GUAIFENESIN/DEXTROMETHORPHAN 100-10MG/5
5 SYRUP ORAL EVERY 4 HOURS PRN
Status: DISCONTINUED | OUTPATIENT
Start: 2021-02-07 | End: 2021-02-09 | Stop reason: HOSPADM

## 2021-02-07 RX ORDER — OXYCODONE HYDROCHLORIDE 5 MG/1
5 TABLET ORAL EVERY 6 HOURS PRN
Status: DISCONTINUED | OUTPATIENT
Start: 2021-02-07 | End: 2021-02-09 | Stop reason: HOSPADM

## 2021-02-07 RX ORDER — QUETIAPINE FUMARATE 100 MG/1
200 TABLET, FILM COATED ORAL NIGHTLY
Status: DISCONTINUED | OUTPATIENT
Start: 2021-02-07 | End: 2021-02-08

## 2021-02-07 RX ORDER — FENOFIBRATE 54 MG/1
54 TABLET ORAL DAILY
Status: DISCONTINUED | OUTPATIENT
Start: 2021-02-07 | End: 2021-02-09 | Stop reason: HOSPADM

## 2021-02-07 RX ORDER — ALOGLIPTIN 12.5 MG/1
6.25 TABLET, FILM COATED ORAL DAILY
Status: DISCONTINUED | OUTPATIENT
Start: 2021-02-07 | End: 2021-02-09 | Stop reason: HOSPADM

## 2021-02-07 RX ORDER — RISPERIDONE 0.5 MG/1
0.5 TABLET, FILM COATED ORAL 2 TIMES DAILY
Status: DISCONTINUED | OUTPATIENT
Start: 2021-02-07 | End: 2021-02-08

## 2021-02-07 RX ORDER — FLUCONAZOLE 100 MG/1
200 TABLET ORAL DAILY
Status: COMPLETED | OUTPATIENT
Start: 2021-02-07 | End: 2021-02-09

## 2021-02-07 RX ORDER — LISINOPRIL 10 MG/1
10 TABLET ORAL DAILY
Status: DISCONTINUED | OUTPATIENT
Start: 2021-02-07 | End: 2021-02-09 | Stop reason: HOSPADM

## 2021-02-07 RX ORDER — DIAZEPAM 5 MG/1
10 TABLET ORAL EVERY 12 HOURS PRN
Status: DISCONTINUED | OUTPATIENT
Start: 2021-02-07 | End: 2021-02-09 | Stop reason: HOSPADM

## 2021-02-07 RX ADMIN — SUCRALFATE 1 G: 1 TABLET ORAL at 19:56

## 2021-02-07 RX ADMIN — CEFTRIAXONE 1000 MG: 1 INJECTION, POWDER, FOR SOLUTION INTRAMUSCULAR; INTRAVENOUS at 13:32

## 2021-02-07 RX ADMIN — ENOXAPARIN SODIUM 40 MG: 100 INJECTION SUBCUTANEOUS at 17:23

## 2021-02-07 RX ADMIN — IPRATROPIUM BROMIDE AND ALBUTEROL SULFATE 1 AMPULE: .5; 3 SOLUTION RESPIRATORY (INHALATION) at 21:50

## 2021-02-07 RX ADMIN — QUETIAPINE FUMARATE 200 MG: 100 TABLET ORAL at 20:13

## 2021-02-07 RX ADMIN — FLUCONAZOLE 200 MG: 100 TABLET ORAL at 13:30

## 2021-02-07 RX ADMIN — PROPRANOLOL HYDROCHLORIDE 60 MG: 20 TABLET ORAL at 20:13

## 2021-02-07 RX ADMIN — ATORVASTATIN CALCIUM 80 MG: 80 TABLET, FILM COATED ORAL at 08:28

## 2021-02-07 RX ADMIN — IPRATROPIUM BROMIDE AND ALBUTEROL SULFATE 1 AMPULE: .5; 3 SOLUTION RESPIRATORY (INHALATION) at 10:53

## 2021-02-07 RX ADMIN — PROPRANOLOL HYDROCHLORIDE 60 MG: 20 TABLET ORAL at 08:27

## 2021-02-07 RX ADMIN — AZITHROMYCIN MONOHYDRATE 250 MG: 250 TABLET ORAL at 08:28

## 2021-02-07 RX ADMIN — PANTOPRAZOLE SODIUM 40 MG: 40 TABLET, DELAYED RELEASE ORAL at 05:08

## 2021-02-07 RX ADMIN — SUCRALFATE 1 G: 1 TABLET ORAL at 08:28

## 2021-02-07 RX ADMIN — DIVALPROEX SODIUM 250 MG: 250 TABLET, FILM COATED, EXTENDED RELEASE ORAL at 19:56

## 2021-02-07 RX ADMIN — GUAIFENESIN 1200 MG: 600 TABLET, EXTENDED RELEASE ORAL at 08:28

## 2021-02-07 RX ADMIN — GLIPIZIDE 10 MG: 10 TABLET ORAL at 17:24

## 2021-02-07 RX ADMIN — SUCRALFATE 1 G: 1 TABLET ORAL at 13:30

## 2021-02-07 RX ADMIN — FENOFIBRATE 54 MG: 54 TABLET, FILM COATED ORAL at 13:31

## 2021-02-07 RX ADMIN — ALOGLIPTIN 6.25 MG: 12.5 TABLET, FILM COATED ORAL at 13:30

## 2021-02-07 RX ADMIN — RISPERIDONE 0.5 MG: 0.5 TABLET ORAL at 13:31

## 2021-02-07 RX ADMIN — IPRATROPIUM BROMIDE AND ALBUTEROL SULFATE 1 AMPULE: .5; 3 SOLUTION RESPIRATORY (INHALATION) at 04:56

## 2021-02-07 RX ADMIN — METFORMIN HYDROCHLORIDE 1000 MG: 500 TABLET ORAL at 17:23

## 2021-02-07 RX ADMIN — GUAIFENESIN 1200 MG: 600 TABLET, EXTENDED RELEASE ORAL at 19:57

## 2021-02-07 RX ADMIN — RISPERIDONE 0.5 MG: 0.5 TABLET ORAL at 19:56

## 2021-02-07 RX ADMIN — IPRATROPIUM BROMIDE AND ALBUTEROL SULFATE 1 AMPULE: .5; 3 SOLUTION RESPIRATORY (INHALATION) at 16:43

## 2021-02-07 RX ADMIN — ASPIRIN 81 MG: 81 TABLET, CHEWABLE ORAL at 08:28

## 2021-02-07 RX ADMIN — SERTRALINE HYDROCHLORIDE 100 MG: 50 TABLET ORAL at 13:31

## 2021-02-07 NOTE — FLOWSHEET NOTE
02/06/21 2050   Assessment   Charting Type Shift assessment   Neurological   Neuro (WDL) X   Level of Consciousness Responds to Pain (2)   Orientation Level JUDITH   Cognition JUDITH   Language JUDITH   [place] Coma Scale   Eye Opening 3   Best Verbal Response 4   Best Motor Response 6   [place] Coma Scale Score 13   Respiratory   Respiratory (WDL) X   Chest Assessment Chest expansion symmetrical   L Breath Sounds Bilateral;Crackles   R Breath Sounds Bilateral;Crackles; Expiratory Wheezes   Cardiac   Cardiac Regularity Regular   Cardiac Rhythm NSR   Gastrointestinal   Abdominal (WDL) X   Abdomen Inspection Rounded   RUQ Bowel Sounds Hypoactive   LUQ Bowel Sounds Hypoactive   RLQ Bowel Sounds Hypoactive   LLQ Bowel Sounds Hypoactive   Peripheral Vascular   Peripheral Vascular (WDL) WDL   Edema Other (Comment)  (scrotum)   Skin Color/Condition   Skin Color/Condition (WDL) WDL   Skin Integrity   Skin Integrity (WDL) X   Skin Integrity Redness  (edema )   Location Scrotum    Skin Integrity Site 2   Skin Integrity Location 2 Blister;Redness   Location 2 Back/Left Axilla    Genitourinary   Genitourinary (WDL) X   Urine Assessment   Incontinence Yes   Genitalia   Male Genitalia Uncircumcised; Enlarged scrotum  (redness)   Patient resting on Bipap at this time, patient alert to verbal stimuli and disoriented at this time. Patient too drowsy to take medications at this time.

## 2021-02-07 NOTE — H&P
History and Physical    Patient:  Jalen Puente    CHIEF COMPLAINT:    Acute encephalopathy    HISTORY OF PRESENT ILLNESS:   The patient is a 61 y.o. male with PMH of morbid obesity, anxiety, prostate cancer, depression, diabetes mellitus type 2, GERD, tobacco abuse, hypertension, hyperlipidemia, chronic low back pain and intellectual delay who presented to the emergency department via EMS for acute encephalopathy and hypoxia. Per ED physician notes, yesterday, family found patient sitting in a chair in his own urine and feces. They called EMS. Upon EMS arrival, he was found to be hypoxic with O2 sats 90% on room air and placed on 5 L nasal cannula with improvement. Patient was alert and oriented x2 but confused. He was then transported to the ED. Family was unsure of patient's last known normal.    In the emergency department, patient underwent routine evaluation. Vitals upon presentation: Blood pressure 122/55, heart rate 94, respiratory rate 25, oxygen sat 100% on 5 L nasal cannula, temperature 97.1. Labs: WBC 10.5, hemoglobin 13.7, hematocrit of 44.2, platelets 775, sodium 138, potassium 5.3, chloride 98, CO2 31, BUN 23, creatinine 1.7, anion gap 9, lactic acid 1.8, glucose 153, alkaline phosphate 46, ALT 19, AST 41, bilirubin 0.4, ammonia 30, proBNP 838, troponin less than 0.3, total CK 4029. Valproic acid 10. Flu and COVID-19 negative. UA with large blood and trace leukocyte Estrace. Urine microscopic with 3-5 WBC, 11-20 RBCs, 0-1 epithelials, rare bacteria, yeast present yeast and trace leukocyte Estrace. ABG: pH 7.335, PCO2 58.7, PO2 95.7, HCO3 30.6, O2 sat 96.7. CT head with no evidence of acute intracranial abnormality. CT chest abdomen pelvis: Patchy parenchymal infiltrates seen in left lower lobe and lingular segment of left upper lobe suspicious for pneumonia. Pulmonary nodule noted. Diffuse tracheal narrowing may indicate tracheomalacia.   Partial compression of L1 vertebral (CARAFATE) 1 GM tablet Take 1 g by mouth 3 times daily    Historical Provider, MD   linaCLOtide (LINZESS) 72 MCG CAPS capsule Take 72 mcg by mouth every morning (before breakfast)    Historical Provider, MD   metFORMIN (GLUCOPHAGE) 1000 MG tablet Take 1,000 mg by mouth 2 times daily (with meals)    Historical Provider, MD   SITagliptin (JANUVIA) 100 MG tablet Take 100 mg by mouth daily    Historical Provider, MD   pantoprazole sodium (PROTONIX) 40 MG PACK packet Take 40 mg by mouth every morning (before breakfast)    Historical Provider, MD   glipiZIDE (GLUCOTROL) 10 MG tablet Take 10 mg by mouth 2 times daily (before meals)    Historical Provider, MD   empagliflozin (JARDIANCE) 10 MG tablet Take 10 mg by mouth daily    Historical Provider, MD   aspirin 81 MG tablet Take 81 mg by mouth daily    Historical Provider, MD   fenofibrate 160 MG tablet Take 160 mg by mouth daily    Historical Provider, MD   lisinopril (PRINIVIL;ZESTRIL) 10 MG tablet Take 10 mg by mouth daily    Historical Provider, MD   sertraline (ZOLOFT) 100 MG tablet Take 100 mg by mouth daily    Historical Provider, MD   tiZANidine (ZANAFLEX) 4 MG tablet Take 4 mg by mouth 2 times daily as needed     Historical Provider, MD   atorvastatin (LIPITOR) 80 MG tablet Take 80 mg by mouth daily    Historical Provider, MD   QUEtiapine (SEROQUEL XR) 50 MG extended release tablet Take 200 mg by mouth nightly     Historical Provider, MD       Allergies:  Patient has no known allergies. Social History:   TOBACCO:   reports that he has been smoking cigarettes. He has a 50.00 pack-year smoking history. He has quit using smokeless tobacco.  ETOH:   reports no history of alcohol use. OCCUPATION:  None     Family History:   History reviewed. No pertinent family history.     Review of system obtained 2/7 once MS improved:  Constitutional:  Denies fever or chills   Eyes:  Denies eye pain or redness  HENT:  Denies nasal congestion or sore throat   Respiratory:  Positive 02/07/2021    BILITOT 0.3 02/07/2021    ALKPHOS 54 02/07/2021    AST 39 (H) 02/07/2021    ALT 24 02/07/2021    LABGLOM >59 02/07/2021    GFRAA >59 02/07/2021    AGRATIO 1.2 02/07/2021    GLOB 3.0 02/07/2021           Lab Results   Component Value Date    WBC 7.7 02/07/2021    HGB 13.0 02/07/2021    HCT 41.9 02/07/2021    MCV 98.1 02/07/2021     02/07/2021       PA/lat CXR:  CT CHEST WO CONTRAST   Final Result      Patchy parenchymal infiltrates seen in left lower lobe and lingular segment of left upper lobe suspicious for pneumonia. 1.1 cm pleural-based nodular density seen laterally in superior segment of left upper lobe. Following the Fleischner Society 2017 guidelines for single solid nodules >8 mm, if patient is low risk then consider CT at 3 months, PET/CT, or tissue sampling. If patient is high risk, then consider CT at 3 months, PET/CT, or tissue sampling.  qzxv      Diffuse tracheal narrowing may indicate tracheomalacia. Partial compression of L1 vertebral body of questionable significance may indicate age-indeterminate fracture. Coronary calcification. Otherwise no acute process identified given limitations of noncontrast evaluation. CT ABDOMEN PELVIS WO CONTRAST Additional Contrast? None   Final Result      Patchy parenchymal infiltrates seen in left lower lobe and lingular segment of left upper lobe suspicious for pneumonia. 1.1 cm pleural-based nodular density seen laterally in superior segment of left upper lobe. Following the Fleischner Society 2017 guidelines for single solid nodules >8 mm, if patient is low risk then consider CT at 3 months, PET/CT, or tissue sampling. If patient is high risk, then consider CT at 3 months, PET/CT, or tissue sampling.  qzxv      Diffuse tracheal narrowing may indicate tracheomalacia. Partial compression of L1 vertebral body of questionable significance may indicate age-indeterminate fracture.       Coronary calcification. Otherwise no acute process identified given limitations of noncontrast evaluation. CT Head WO Contrast   Final Result      No evidence of acute intracranial abnormality. EKG: Normal sinus rhythm, rate 92, normal QRS, prolonged QT, no ST depression or elevation, normal T wave, artifact noted    Assessment and Plan     Active Hospital Problems    Diagnosis Date Noted    Pneumonia [J18.9]  -CT chest 2/6: Patchy parenchymal infiltrates seen in left lower lobe and lingular segment of left upper lobe suspicious for pneumonia  -Blood cultures pending  -Continue treatment with IV Rocephin and azithromycin  -Continue DuoNebs every 4 hours while awake  -Encourage incentive spirometry and ambulation  -Flutter valve  -Continue Mucinex and Robitussin  -Continue supplemental oxygen to maintain sats greater than 90%; at baseline, patient does not require oxygen. Currently on 2 L nasal cannula. Pulmonary nodules  -CT chest 2/6: 1.1 cm pleural-based nodular density seen laterally in superior segment of left upper lobe. Following the Fleischner Society 2017 guidelines for signal solid nodules greater than 8 mm, if patient is low risk then consider CT at 3 months, PET/CT or tissue sampling.   If patient is high risk, then consider CT at 3 months, PET/CT or switch to sampling.  -We will need referral to pulmonology at discharge   02/07/2021    Obesity [E66.9]  -BMI 35.40  -Complicates all aspects of care  -Counseled on diet exercise after recovery   02/07/2021    TYLER (acute kidney injury) ([facility] [address] [N17.9]  -Serum creatinine 1.7 on 2/6; down to 1.2 after receiving IV fluids and holding nephrotoxic agents (lisinopril, oral diabetic medications) on admission  -Suspect secondary to acute encephalopathy/poor p.o. intake/sepsis/home lisinopril  -Avoid nephrotoxins and NSAIDs  -Encourage good p.o. intake   02/07/2021    Hyperkalemia [E87.5]  -Potassium slightly elevated at 5.3 on dependency  -NRT  -counseled on cessation    Recurrent falls  -PT OT consulted  -CM consulted for dc planning assistance  -fall precautions 02/06/2021     Patient was seen and examined by Dr. Tank Cárdenas and plan of care reviewed.       Renu Vann certifies per [place] regulation for 42 .15(a), that the patient may reasonably be expected to be discharged or transferred to a hospital within 96 hours after admission to [address] [place] [address]    Electronically signed by ISAURA Simmons on 2/7/2021 at 12:24 PM

## 2021-02-08 ENCOUNTER — APPOINTMENT (OUTPATIENT)
Dept: ULTRASOUND IMAGING | Facility: HOSPITAL | Age: 61
DRG: 682 | End: 2021-02-08
Payer: MEDICAID

## 2021-02-08 PROBLEM — R29.6 RECURRENT FALLS WHILE WALKING: Status: ACTIVE | Noted: 2021-02-08

## 2021-02-08 PROBLEM — M62.82 RHABDOMYOLYSIS: Status: ACTIVE | Noted: 2021-02-08

## 2021-02-08 PROBLEM — E55.9 VITAMIN D DEFICIENCY: Status: ACTIVE | Noted: 2021-02-08

## 2021-02-08 LAB
A/G RATIO: 1.5 (ref 0.8–2)
ALBUMIN SERPL-MCNC: 3.7 G/DL (ref 3.4–4.8)
ALP BLD-CCNC: 44 U/L (ref 25–100)
ALT SERPL-CCNC: 25 U/L (ref 4–36)
ANION GAP SERPL CALCULATED.3IONS-SCNC: 6 MMOL/L (ref 3–16)
AST SERPL-CCNC: 36 U/L (ref 8–33)
BASE EXCESS ARTERIAL: 4.3 MMOL/L (ref -3–3)
BILIRUB SERPL-MCNC: <0.2 MG/DL (ref 0.3–1.2)
BUN BLDV-MCNC: 29 MG/DL (ref 6–20)
CALCIUM SERPL-MCNC: 9.2 MG/DL (ref 8.5–10.5)
CHLORIDE BLD-SCNC: 97 MMOL/L (ref 98–107)
CO2: 33 MMOL/L (ref 20–30)
CREAT SERPL-MCNC: 1.1 MG/DL (ref 0.4–1.2)
FIO2: 0.28 %
FOLATE: 7.11 NG/ML
GFR AFRICAN AMERICAN: >59
GFR NON-AFRICAN AMERICAN: >59
GLOBULIN: 2.5 G/DL
GLUCOSE BLD-MCNC: 147 MG/DL (ref 74–106)
GLUCOSE BLD-MCNC: 151 MG/DL (ref 74–106)
GLUCOSE BLD-MCNC: 190 MG/DL (ref 74–106)
HCO3 ARTERIAL: 31 MMOL/L (ref 22–26)
HCT VFR BLD CALC: 40.3 % (ref 40–54)
HEMOGLOBIN: 12.2 G/DL (ref 13–18)
MCH RBC QN AUTO: 29.8 PG (ref 27–32)
MCHC RBC AUTO-ENTMCNC: 30.3 G/DL (ref 31–35)
MCV RBC AUTO: 98.5 FL (ref 80–100)
O2 SAT, ARTERIAL: 98.6 %
O2 THERAPY: ABNORMAL
PCO2 ARTERIAL: 57.5 MMHG (ref 35–45)
PDW BLD-RTO: 13.1 % (ref 11–16)
PERFORMED ON: ABNORMAL
PERFORMED ON: ABNORMAL
PH ARTERIAL: 7.35 (ref 7.35–7.45)
PLATELET # BLD: 281 K/UL (ref 150–400)
PMV BLD AUTO: 8.6 FL (ref 6–10)
PO2 ARTERIAL: 133.8 MMHG (ref 80–100)
POTASSIUM REFLEX MAGNESIUM: 4.4 MMOL/L (ref 3.4–5.1)
RBC # BLD: 4.09 M/UL (ref 4.5–6)
SODIUM BLD-SCNC: 136 MMOL/L (ref 136–145)
TCO2 ARTERIAL: 32.8 MMOL/L (ref 24–30)
TOTAL CK: 1749 U/L (ref 26–174)
TOTAL PROTEIN: 6.2 G/DL (ref 6.4–8.3)
TSH SERPL DL<=0.05 MIU/L-ACNC: 3.36 UIU/ML (ref 0.27–4.2)
VITAMIN B-12: 427 PG/ML (ref 211–911)
VITAMIN D 25-HYDROXY: 12.6 (ref 32–100)
WBC # BLD: 5.7 K/UL (ref 4–11)

## 2021-02-08 PROCEDURE — 6370000000 HC RX 637 (ALT 250 FOR IP): Performed by: PHYSICIAN ASSISTANT

## 2021-02-08 PROCEDURE — 82803 BLOOD GASES ANY COMBINATION: CPT

## 2021-02-08 PROCEDURE — 97530 THERAPEUTIC ACTIVITIES: CPT

## 2021-02-08 PROCEDURE — 84443 ASSAY THYROID STIM HORMONE: CPT

## 2021-02-08 PROCEDURE — 99232 SBSQ HOSP IP/OBS MODERATE 35: CPT | Performed by: INTERNAL MEDICINE

## 2021-02-08 PROCEDURE — 6360000002 HC RX W HCPCS: Performed by: PHYSICIAN ASSISTANT

## 2021-02-08 PROCEDURE — 2700000000 HC OXYGEN THERAPY PER DAY

## 2021-02-08 PROCEDURE — 36415 COLL VENOUS BLD VENIPUNCTURE: CPT

## 2021-02-08 PROCEDURE — 82306 VITAMIN D 25 HYDROXY: CPT

## 2021-02-08 PROCEDURE — 93971 EXTREMITY STUDY: CPT

## 2021-02-08 PROCEDURE — 80053 COMPREHEN METABOLIC PANEL: CPT

## 2021-02-08 PROCEDURE — 1200000000 HC SEMI PRIVATE

## 2021-02-08 PROCEDURE — 85027 COMPLETE CBC AUTOMATED: CPT

## 2021-02-08 PROCEDURE — 82607 VITAMIN B-12: CPT

## 2021-02-08 PROCEDURE — 82550 ASSAY OF CK (CPK): CPT

## 2021-02-08 PROCEDURE — 94761 N-INVAS EAR/PLS OXIMETRY MLT: CPT

## 2021-02-08 PROCEDURE — 92610 EVALUATE SWALLOWING FUNCTION: CPT

## 2021-02-08 PROCEDURE — 97161 PT EVAL LOW COMPLEX 20 MIN: CPT

## 2021-02-08 PROCEDURE — 97165 OT EVAL LOW COMPLEX 30 MIN: CPT

## 2021-02-08 PROCEDURE — 82746 ASSAY OF FOLIC ACID SERUM: CPT

## 2021-02-08 PROCEDURE — 2580000003 HC RX 258: Performed by: PHYSICIAN ASSISTANT

## 2021-02-08 PROCEDURE — 94660 CPAP INITIATION&MGMT: CPT

## 2021-02-08 PROCEDURE — 94667 MNPJ CHEST WALL 1ST: CPT

## 2021-02-08 PROCEDURE — 97535 SELF CARE MNGMENT TRAINING: CPT

## 2021-02-08 PROCEDURE — 94640 AIRWAY INHALATION TREATMENT: CPT

## 2021-02-08 PROCEDURE — 36600 WITHDRAWAL OF ARTERIAL BLOOD: CPT

## 2021-02-08 RX ORDER — PREDNISONE 20 MG/1
40 TABLET ORAL DAILY
Status: DISCONTINUED | OUTPATIENT
Start: 2021-02-08 | End: 2021-02-09 | Stop reason: HOSPADM

## 2021-02-08 RX ORDER — QUETIAPINE FUMARATE 100 MG/1
100 TABLET, FILM COATED ORAL NIGHTLY
Status: DISCONTINUED | OUTPATIENT
Start: 2021-02-08 | End: 2021-02-09 | Stop reason: HOSPADM

## 2021-02-08 RX ORDER — ERGOCALCIFEROL 1.25 MG/1
50000 CAPSULE ORAL WEEKLY
Status: DISCONTINUED | OUTPATIENT
Start: 2021-02-15 | End: 2021-02-09 | Stop reason: HOSPADM

## 2021-02-08 RX ORDER — ERGOCALCIFEROL 1.25 MG/1
50000 CAPSULE ORAL DAILY
Status: DISCONTINUED | OUTPATIENT
Start: 2021-02-08 | End: 2021-02-09 | Stop reason: HOSPADM

## 2021-02-08 RX ORDER — RISPERIDONE 0.5 MG/1
0.25 TABLET, FILM COATED ORAL 2 TIMES DAILY
Status: DISCONTINUED | OUTPATIENT
Start: 2021-02-08 | End: 2021-02-09 | Stop reason: HOSPADM

## 2021-02-08 RX ORDER — BUDESONIDE 0.5 MG/2ML
0.5 INHALANT ORAL 2 TIMES DAILY
Status: DISCONTINUED | OUTPATIENT
Start: 2021-02-08 | End: 2021-02-09 | Stop reason: HOSPADM

## 2021-02-08 RX ADMIN — ASPIRIN 81 MG: 81 TABLET, CHEWABLE ORAL at 08:29

## 2021-02-08 RX ADMIN — AZITHROMYCIN MONOHYDRATE 250 MG: 250 TABLET ORAL at 08:29

## 2021-02-08 RX ADMIN — SUCRALFATE 1 G: 1 TABLET ORAL at 20:18

## 2021-02-08 RX ADMIN — FLUCONAZOLE 200 MG: 100 TABLET ORAL at 08:30

## 2021-02-08 RX ADMIN — RISPERIDONE 0.5 MG: 0.5 TABLET ORAL at 08:29

## 2021-02-08 RX ADMIN — IPRATROPIUM BROMIDE AND ALBUTEROL SULFATE 1 AMPULE: .5; 3 SOLUTION RESPIRATORY (INHALATION) at 10:10

## 2021-02-08 RX ADMIN — GUAIFENESIN 1200 MG: 600 TABLET, EXTENDED RELEASE ORAL at 20:18

## 2021-02-08 RX ADMIN — GUAIFENESIN 1200 MG: 600 TABLET, EXTENDED RELEASE ORAL at 08:29

## 2021-02-08 RX ADMIN — FENOFIBRATE 54 MG: 54 TABLET, FILM COATED ORAL at 08:29

## 2021-02-08 RX ADMIN — DICLOFENAC SODIUM 2 G: 10 GEL TOPICAL at 20:18

## 2021-02-08 RX ADMIN — PROPRANOLOL HYDROCHLORIDE 60 MG: 20 TABLET ORAL at 08:39

## 2021-02-08 RX ADMIN — ATORVASTATIN CALCIUM 80 MG: 80 TABLET, FILM COATED ORAL at 08:29

## 2021-02-08 RX ADMIN — ALOGLIPTIN 6.25 MG: 12.5 TABLET, FILM COATED ORAL at 08:29

## 2021-02-08 RX ADMIN — PREDNISONE 40 MG: 20 TABLET ORAL at 13:09

## 2021-02-08 RX ADMIN — IPRATROPIUM BROMIDE AND ALBUTEROL SULFATE 1 AMPULE: .5; 3 SOLUTION RESPIRATORY (INHALATION) at 05:25

## 2021-02-08 RX ADMIN — ENOXAPARIN SODIUM 40 MG: 100 INJECTION SUBCUTANEOUS at 17:37

## 2021-02-08 RX ADMIN — BUDESONIDE 500 MCG: 0.5 SUSPENSION RESPIRATORY (INHALATION) at 13:55

## 2021-02-08 RX ADMIN — PANTOPRAZOLE SODIUM 40 MG: 40 TABLET, DELAYED RELEASE ORAL at 06:38

## 2021-02-08 RX ADMIN — GLIPIZIDE 10 MG: 10 TABLET ORAL at 06:39

## 2021-02-08 RX ADMIN — METFORMIN HYDROCHLORIDE 1000 MG: 500 TABLET ORAL at 08:29

## 2021-02-08 RX ADMIN — SUCRALFATE 1 G: 1 TABLET ORAL at 08:29

## 2021-02-08 RX ADMIN — RISPERIDONE 0.25 MG: 0.5 TABLET ORAL at 20:18

## 2021-02-08 RX ADMIN — CEFTRIAXONE 1000 MG: 1 INJECTION, POWDER, FOR SOLUTION INTRAMUSCULAR; INTRAVENOUS at 13:10

## 2021-02-08 RX ADMIN — GUAIFENESIN AND DEXTROMETHORPHAN 5 ML: 100; 10 SYRUP ORAL at 02:35

## 2021-02-08 RX ADMIN — GLIPIZIDE 10 MG: 10 TABLET ORAL at 17:37

## 2021-02-08 RX ADMIN — DIVALPROEX SODIUM 250 MG: 250 TABLET, FILM COATED, EXTENDED RELEASE ORAL at 20:18

## 2021-02-08 RX ADMIN — PROPRANOLOL HYDROCHLORIDE 60 MG: 20 TABLET ORAL at 20:18

## 2021-02-08 RX ADMIN — GUAIFENESIN AND DEXTROMETHORPHAN 5 ML: 100; 10 SYRUP ORAL at 20:18

## 2021-02-08 RX ADMIN — IPRATROPIUM BROMIDE AND ALBUTEROL SULFATE 1 AMPULE: .5; 3 SOLUTION RESPIRATORY (INHALATION) at 13:54

## 2021-02-08 RX ADMIN — SUCRALFATE 1 G: 1 TABLET ORAL at 13:09

## 2021-02-08 RX ADMIN — SERTRALINE HYDROCHLORIDE 100 MG: 50 TABLET ORAL at 08:29

## 2021-02-08 RX ADMIN — QUETIAPINE FUMARATE 100 MG: 100 TABLET ORAL at 20:19

## 2021-02-08 RX ADMIN — ERGOCALCIFEROL 50000 UNITS: 1.25 CAPSULE ORAL at 13:09

## 2021-02-08 NOTE — PLAN OF CARE
Problem: Falls - Risk of:  Goal: Will remain free from falls  Description: Will remain free from falls  Outcome: Met This Shift     Problem: Skin Integrity:  Goal: Will show no infection signs and symptoms  Description: Will show no infection signs and symptoms  Outcome: Ongoing

## 2021-02-08 NOTE — PROGRESS NOTES
Facility/Department: [facility] MED SURG   CLINICAL BEDSIDE SWALLOW EVALUATION    NAME: Yashira Mendenhall  : 1960  MRN: [id number]    ADMISSION DATE: 2021  ADMITTING DIAGNOSIS: has Acute encephalopathy; Pneumonia; Obesity; TYLER (acute kidney injury) ([facility] [address] Hyperkalemia; Acute respiratory failure with hypercapnia ([address] Type 2 diabetes mellitus ([facility] [address] UTI (urinary tract infection); Hypertension; Anxiety; Left shoulder pain; Generalized weakness; Pulmonary nodule; Tobacco abuse; and Compression fracture of L1 lumbar vertebra (HCC) on their problem list.  ONSET DATE: 2021    Recent Chest Xray/CT of Chest: (2021)      Impression       Patchy parenchymal infiltrates seen in left lower lobe and lingular segment of left upper lobe suspicious for pneumonia.       1.1 cm pleural-based nodular density seen laterally in superior segment of left upper lobe. Following the Fleischner Society 2017 guidelines for single solid nodules >8 mm, if patient is low risk then consider CT at 3 months, PET/CT, or tissue sampling.     If patient is high risk, then consider CT at 3 months, PET/CT, or tissue sampling.  qzxv       Diffuse tracheal narrowing may indicate tracheomalacia.       Partial compression of L1 vertebral body of questionable significance may indicate age-indeterminate fracture.       Coronary calcification.       Otherwise no acute process identified given limitations of noncontrast evaluation.         Date of Eval: 2021  Evaluating Therapist: Teresa Smith    Current Diet level:  Current Diet : Regular  Current Liquid Diet : Thin      Primary Complaint  Patient Complaint: Patient reported \"I think I just fell - that's all it was - cause I'm clumsy\".     Pain:  None reported or indicated    Reason for Referral  Yashira Mendenhall was referred for a bedside swallow evaluation to assess the efficiency of his swallow function, identify signs and symptoms of aspiration and make recommendations regarding safe dietary consistencies, effective compensatory strategies, and safe eating environment. Impression  Dysphagia Diagnosis: Mild oral stage dysphagia  Dysphagia Impression : Min oral phase dysphagia  Dysphagia Outcome Severity Scale: Level 5: Mild dysphagia- Distant supervision. May need one diet consistency restricted     Treatment Plan  Requires SLP Intervention: No  Duration/Frequency of Treatment: N/A - Skilled ST services not indicated at this time. D/C Recommendations: To be determined     Recommended Diet and Intervention  Diet Solids Recommendation: Dysphagia Soft and Bite-Sized (Dysphagia III)  Liquid Consistency Recommendation: Thin     Compensatory Swallowing Strategies  Compensatory Swallowing Strategies: Alternate solids and liquids;Small bites/sips;Eat/Feed slowly; Remain upright for 30-45 minutes after meals;Upright as possible for all oral intake;Swallow 2 times per bite/sip    Treatment/Goals  Short-term Goals  Timeframe for Short-term Goals: N/A - Skilled ST services not indicated at this time. Long-term Goals  Timeframe for Long-term Goals: N/A - Skilled ST services not indicated at this time. General  Chart Reviewed: Yes  Subjective  Subjective: Patient upright in bed, consuming breakfast; pleasant and agreeable to ST eval. Patient denies difficulty chewing or swallowing. Alert and oriented to self, place and year. Behavior/Cognition: Alert; Cooperative;Pleasant mood  Communication Observation: Functional  Follows Directions: Simple  Dentition: Adequate  Patient Positioning: Upright in bed  Prior Dysphagia History: None reported or indicated. Consistencies Administered: Reg solid; Thin - straw    Vision/Hearing  Vision  Vision: Within Functional Limits  Hearing  Hearing: Within functional limits    Oral Motor Deficits  Oral/Motor  Oral Motor: Exceptions to [facility]  Lingual Coordination: Reduced    Oral Phase Dysfunction  Oral Phase  Oral Phase: Exceptions  Oral Phase Dysfunction  Decreased Anterior to Posterior Transit: Reg solid  Oral Phase  Oral Phase - Comment: Patient consumed trials of regular texture/thin liquids via straw. Patient presented with delay of oral transit time, however, cleared effectively. Patient demonstrated difficulty cutting foods and would benefit from soft and bite sized texture. Indicators of Pharyngeal Phase Dysfunction   Pharyngeal Phase  Pharyngeal Phase: WNL  Pharyngeal Phase   Pharyngeal: No deficits indicated    Prognosis  Prognosis  Prognosis for safe diet advancement: good  Barriers to reach goals: cognitive deficits  Barriers/Prognosis Comment: Good for recommended diet. Individuals consulted  Consulted and agree with results and recommendations: Patient;Dietitian    Education  Patient Education: Aspiration precaution guidelines; safe swallow strategies  Safety Devices in place: Yes  Type of devices: Bed alarm in place; All fall risk precautions in place; Patient at risk for falls; Left in bed;Call light within reach       Therapy Time  SLP Individual Minutes  Time In: 6100  Time Out: 0805  Minutes: Anahi Campos, [place]  2/8/2021 9:15 AM

## 2021-02-08 NOTE — PROGRESS NOTES
Progress Note      Subjective:   Chief complaint: altered mental status    Interval History:   Patient difficult to arouse after morning meds, but worked with speech therapy this morning. He did not sleep with BIPAP overnight but is tolerating currently and is attempting to answer questions. Patient's niece and primary caregiver, Brian Carter, is present at bedside and reports multiple falls at home, and history of lumbar fracture in the past.  She denies any history of sleep study or use of NIPPV at home. Review of systems:   Unable to obtain due to altered mental status and mental disability. Past medical history, surgical history, family history and social history reviewed and unchanged compared to H&P earlier this admission. Medications:   Scheduled Meds:   QUEtiapine  100 mg Oral Nightly    risperiDONE  0.25 mg Oral BID    fluconazole  200 mg Oral Daily    empagliflozin  10 mg Oral Daily    fenofibrate  54 mg Oral Daily    glipiZIDE  10 mg Oral BID AC    metFORMIN  1,000 mg Oral BID WC    [Held by provider] lisinopril  10 mg Oral Daily    sertraline  100 mg Oral Daily    alogliptin  6.25 mg Oral Daily    nicotine  1 patch Transdermal Daily    aspirin  81 mg Oral Daily    [Held by provider] atorvastatin  80 mg Oral Daily    divalproex  250 mg Oral Nightly    guaiFENesin  1,200 mg Oral BID    linaCLOtide  72 mcg Oral QAM AC    pantoprazole  40 mg Oral QAM AC    propranolol  60 mg Oral BID    sucralfate  1 g Oral TID    enoxaparin  40 mg Subcutaneous Daily    cefTRIAXone (ROCEPHIN) IV  1,000 mg Intravenous Q24H    azithromycin  250 mg Oral Daily    ipratropium-albuterol  1 ampule Inhalation Q4H WA     Continuous Infusions:    Objective:     Vital Signs  Temp: 97.8 °F (36.6 °C)  Pulse: 65  Resp: 18  BP: 124/78  SpO2: 94 %  O2 Device: PAP (positive airway pressure)  O2 Flow Rate (L/min): 2 L/min    Vital signs reviewed in electronic charts.     Physical exam    Constitutional:  Well developed, well nourished, obese male, no acute distress on BIPAP now, and awakens to physical stimuli  Eyes:  PERRL, conjunctiva  Normal, slight yellow discharge noted bilaterally upon awakening  HENT:  Atraumatic, external ears normal, external nose/nares normal, oropharynx moist  Neck:  Supple. No JVD or thyromegaly. Respiratory:  No respiratory distress, normal breath sounds, no rales, no wheezing. Tolerating BIPAP  Cardiovascular:  Normal rate, normal rhythm, no murmurs, no gallops, no rubs. GI:  Soft, nondistended, normal bowel sounds, nontender, no organomegaly, no mass. :  No costovertebral angle tenderness. Musculoskeletal: No tenderness, moving all extremities in bed, trace edema upper and lower left extremities today compared to right side   Integument:  Well hydrated, no rash. Lymphatic:  No cervical or axillary lymphadenopathy noted. Neurologic:  Groggy, but does awaken and reports he is in [place],  no focal deficits noted. Strength is equal throughout. Psychiatric:  Speech and behavior appropriate, underlying . Results:     Lab Results   Component Value Date    WBC 5.7 02/08/2021    HGB 12.2 (L) 02/08/2021    HCT 40.3 02/08/2021    MCV 98.5 02/08/2021     02/08/2021       Lab Results   Component Value Date     02/08/2021    K 4.4 02/08/2021    CL 97 02/08/2021    CO2 33 02/08/2021    BUN 29 02/08/2021    CREATININE 1.1 02/08/2021    GLUCOSE 151 02/08/2021    CALCIUM 9.2 02/08/2021        Assessment and Plan:      Active Hospital Problems    Diagnosis Date Noted    Acute respiratory failure with hypercapnia (HCC) [J96.02] 02/07/2021     ABG upon arrival with evidence of acute respiratory failure with hypercapnia  -Suspect secondary to pneumonia  -Improved with BiPAP; now on 2 L nasal cannula; wean as tolerated  -Continue treatment for pneumonia as outlined below  -Suspect underlying COPD, undiagnosed  -Start oral prednisone 40 mg daily for 5 days    Rhabdomyolysis [M62.82]  CK on admission 4029, had one liter bolus in ED  Statin held  Repeat CK pending today 02/08/2021    Recurrent falls while walking [R29.6]  PT OT consulted  -CM consulted for dc planning assistance  -fall precautions 02/08/2021    Pneumonia [J18.9]  -CT chest 2/6: Patchy parenchymal infiltrates seen in left lower lobe and lingular segment of left upper lobe suspicious for pneumonia  -Blood cultures NGTD  -Continue treatment with IV Rocephin and po azithromycin  -Continue DuoNebs every 4 hours while awake, start Pulmicort twice daily  -Encourage incentive spirometry and ambulation  -Flutter valve  -Continue Mucinex and Robitussin  -Continue supplemental oxygen to maintain sats greater than 90%; at baseline, patient does not require oxygen. Currently on 2 L nasal cannula.   Wean order in place 02/07/2021    Obesity [E66.9]  BMI 35.47  -Complicates all aspects of care  -Counseled on diet exercise after recovery 02/07/2021    TYLER (acute kidney injury) ([facility] [address] [N17.9]  -Resolved  -Suspect secondary to acute encephalopathy/poor p.o. intake/home lisinopril  -Avoid nephrotoxins and NSAIDs  -Encourage good p.o. intake   02/07/2021    Hyperkalemia [E87.5]  Resolved after holding home lisinopril and IV fluids in ED  -Continue holding lisinopril for now in setting of recovering TYLER and recent hyperkalemia 02/07/2021    Type 2 diabetes mellitus ([facility] [address] [E11.9]  -Continue home Januvia, Glucotrol  -Hold metformin until results of the left upper and left lower extremity Dopplers are negative  -A1c 8.5 on 2/7/2020  -Glucose is currently controlled  -Encourage compliance with diabetic diet   02/07/2021    UTI (urinary tract infection) [N39.0]  -Noted on urine urinalysis  -Treat with 3 days of fluconazole, day 2 of 3 today 02/07/2021    Hypertension [I10]  -Continue home propranolol  -Continue to hold lisinopril as blood pressure is normotensive inpatient recovering from TYLER and hyperkalemia 02/07/2021    Anxiety [F41.9]  -Continue home Depakote and Zoloft  -Decreased dose of Seroquel to 100 mg from 200 mg today due to increased somnolence, decreased risperidone dose to 0.25 mg p.o. twice daily due to same  -Hold Valium 02/07/2021    Left shoulder pain [M25.512]  -Patient with limited mobility of left upper extremity  -History of multiple recent falls at home  -X-ray left shoulder demonstrates no fracture   -hold MRI of left shoulder for now 02/07/2021    Generalized weakness [R53.1]  -PT OT consulted  -Case management consulted for discharge planning assistance; given patient was sitting in urine and feces prior to arrival, will need to ensure safe disposition 02/07/2021    Pulmonary nodule [R91.1]  CT chest 2/6: 1.1 cm pleural-based nodular density seen laterally in superior segment of left upper lobe. Following the Fleischner Society 2017 guidelines for signal solid nodules greater than 8 mm, if patient is low risk then consider CT at 3 months, PET/CT or tissue sampling. If patient is high risk, then consider CT at 3 months, PET/CT or switch to sampling.  -We will need referral to pulmonology at discharge 02/07/2021    Tobacco abuse [Z72.0]  -NRT  -Counseled on cessation 02/07/2021    Compression fracture of L1 lumbar vertebra (HCC) [S32.010A], chronic  - CT L spine, L1 vertebral body partial compression fracture with 50% compression injury which is unchanged from MRI on 1/18/2017 of lumbar spine  -Chronic, stable finding 02/07/2021    Acute encephalopathy [G93.40]  -Suspect secondary to acute hypercapnic respiratory failure in setting of pneumonia  -Continue BiPAP while sleeping, as patient most likely has undiagnosed LEONEL/COPD  -Continue antibiotics and treatment as outlined above 02/06/2021     The patient was seen and examined by Dr. Derian Locke and plan of care reviewed.     Electronically signed by ISAURA Haro on 2/8/2021 at 11:22 AM

## 2021-02-08 NOTE — FLOWSHEET NOTE
02/08/21 0800   Assessment   Charting Type Shift assessment   Neurological   Neuro (WDL) X   Level of Consciousness Alert (0)   Orientation Level Oriented to person;Oriented to place;Oriented to time;Oriented to situation   R Hand  Strong   L Hand  Strong   [place] Coma Scale   Eye Opening 4   Best Verbal Response 5   Best Motor Response 6   [place] Coma Scale Score 15   HEENT   HEENT (WDL) WDL   Respiratory   Respiratory (WDL) X   Chest Assessment Chest expansion symmetrical   L Breath Sounds Rhonchi   R Breath Sounds Rhonchi   Cough/Sputum   Cough Strong;Barky   Frequency Frequent   Sputum Amount None   Cardiac   Cardiac (WDL) WDL   Cardiac Regularity Regular   Cardiac Rhythm NSR   Cardiac Monitor   Telemetry Monitor On No   Gastrointestinal   Abdominal (WDL) X   Abdomen Inspection Rounded   RUQ Bowel Sounds Hypoactive   LUQ Bowel Sounds Hypoactive   RLQ Bowel Sounds Hypoactive   LLQ Bowel Sounds Hypoactive   Peripheral Vascular   Peripheral Vascular (WDL) WDL   Edema Other (Comment)  (scrotum)   Skin Color/Condition   Skin Color/Condition (WDL) WDL   Skin Integrity   Skin Integrity (WDL) X   Skin Integrity Redness   Location scrotum   Skin Integrity Site 2   Skin Integrity Location 2 Redness   Location 2 back   Genitourinary   Genitourinary (WDL) X   Urine Assessment   Incontinence Yes   Genitalia   Male Genitalia Uncircumcised; Enlarged scrotum  (redness)   Anus/Rectum   Anus/Rectum (WDL) WDL   Psychosocial   Psychosocial (WDL) WDL

## 2021-02-08 NOTE — PROGRESS NOTES
Occupational Therapy   Occupational Therapy Initial Assessment  Date: 2021   Patient Name: Peter Fernandez  MRN: [id number]     : 1960    Date of Service: 2021    Discharge Recommendations:  Continue to assess pending progress  OT Equipment Recommendations  Equipment Needed: Yes  Mobility Devices: Brenda Polly: Rolling    Assessment   Performance deficits / Impairments: Decreased cognition;Decreased ADL status; Decreased endurance;Decreased strength;Decreased high-level IADLs;Decreased functional mobility   Assessment: Pt agreeable to OT services. Pt soiled brief upon coming to sit. Pt unaware and states this UI and bowel incontinence occurs at home as well at times. Pt required assist for meghan hygiene and dressing. Pt assisted with toileting with CGA for transfer and max assist for meghan hygiene. Pt able to ambulate and transfer with RW with CGA and min verbal cuing for safety. Pt left in bed with call light in reach. Pt will benefit from skilled OT services while IP to improve independence with ADL's and mobility. Prognosis: Good  Decision Making: Low Complexity  OT Education: Transfer Training  REQUIRES OT FOLLOW UP: Yes  Activity Tolerance  Activity Tolerance: Patient Tolerated treatment well           Patient Diagnosis(es): The primary encounter diagnosis was Acute respiratory failure with hypoxia and hypercapnia ([facility] [address] Diagnoses of COVID-19 virus antibody negative, Pneumonia due to organism, Elevated CK, and Acute metabolic encephalopathy were also pertinent to this visit. has a past medical history of Anxiety, B12 deficiency, Cancer ([address] Chronic constipation, DDD (degenerative disc disease), lumbar, Depression, Diabetes mellitus ([address] GERD (gastroesophageal reflux disease), Hyperlipidemia, Hypertension, and Lower back pain. has a past surgical history that includes Cholecystectomy and Prostatectomy (2018).            Restrictions  Restrictions/Precautions  Restrictions/Precautions: General Precautions, Fall Risk  Required Braces or Orthoses?: No    Subjective   General  Chart Reviewed: Yes  Patient assessed for rehabilitation services?: Yes  Family / Caregiver Present: Yes  Referring Practitioner: Pauline Carpio PA-C  Diagnosis: Acute respiratory failure with hypercapnia  Subjective  Subjective: Pt agreeable to OT services. Pt states his LUE shoulder was hurting after having a fall the night before. Family member present reporting that pt had fall, and confused conversation. Patient Currently in Pain: Denies  Vital Signs  Resp: 18  Patient Currently in Pain: Denies  Oxygen Therapy  O2 Device: Nasal cannula  O2 Flow Rate (L/min): 2 L/min  Social/Functional History  Social/Functional History  Lives With: Family(Pt lives with niece and her spouse)  Type of Home: Trailer  Home Layout: One level  Home Access: Ramped entrance  Bathroom Shower/Tub: Tub/Shower unit  Bathroom Toilet: Standard  Home Equipment: Standard walker, Cane  Receives Help From: Family  ADL Assistance: Independent  Homemaking Assistance: Independent  Homemaking Responsibilities: Yes(helps with household tasks like washing dishes.)  Ambulation Assistance: Independent(normally ambulates without AD, two days prior to admission began using standard walker)  Transfer Assistance: Independent  Active : No       Objective   Vision: Impaired  Vision Exceptions: Wears glasses at all times  Hearing: Within functional limits    Orientation  Overall Orientation Status: Within Functional Limits(did not know current month)  Observation/Palpation  Posture: Fair  Observation: 2L 02, lying in bed, family present  Balance  Sitting Balance: Stand by assistance  Standing Balance: Contact guard assistance  Functional Mobility  Functional - Mobility Device: Rolling Walker  Activity: To/from bathroom  Assist Level: Contact guard assistance  ADL  UE Dressing: Contact guard assistance  LE Dressing: Moderate assistance  Toileting:  Moderate assistance  Tone RUE  RUE Tone: Normotonic  Tone LUE  LUE Tone: Normotonic  Coordination  Movements Are Fluid And Coordinated: Yes     Bed mobility  Supine to Sit: Stand by assistance  Sit to Supine: Contact guard assistance  Scooting: Modified independent  Transfers  Stand Pivot Transfers: Contact guard assistance  Sit to stand: Contact guard assistance     Cognition  Cognition Comment: Cognitive delay, able to answer questions appropriately, able to follow 1-2 step commands                 LUE AROM (degrees)  LUE AROM : WFL  LUE General AROM: Pain with AROM secondary to recent fall  RUE AROM (degrees)  RUE AROM : WFL  LUE Strength  L Shoulder Flex: 4-/5  L Elbow Flex: 4/5  L Elbow Ext: 4-/5  L Hand General: 4/5  RUE Strength  R Shoulder Flex: 4/5  R Elbow Flex: 4/5  R Elbow Ext: 4-/5  R Hand General: 4/5                   Plan   Plan  Times per week: 3-5  Times per day: Daily  Plan weeks: 1  Current Treatment Recommendations: Strengthening, Balance Training, ROM, Functional Mobility Training, Endurance Training, Self-Care / ADL, Patient/Caregiver Education & Training    G-Code     OutComes Score                                                  AM-PAC Score             Goals  Short term goals  Time Frame for Short term goals: 1 week  Short term goal 1: Pt to complete toileting with MOD I. Short term goal 2: Pt to complete hygiene/grooming with MOD I. Short term goal 3: Pt to complete dressing with MOD I. Short term goal 4: Pt to complete bathing with MOD I. Short term goal 5: Pt to tolerate x15 minutes of activity to increase functional activity tolerance. Therapy Time   Individual Concurrent Group Co-treatment   Time In 2323         Time Out 1310         Minutes 42           This note serves as a DC summary in the event of pt discharge.         Elisa Nicholson, OTR/L
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
## 2021-02-08 NOTE — PROGRESS NOTES
Physical Therapy    Facility/Department: [facility] MED SURG  Initial Assessment    NAME: Rock Anaya  : 1960  MRN: [id number]    Date of Service: 2021    Discharge Recommendations:  Continue to assess pending progress, Home with assist PRN        Assessment   Body structures, Functions, Activity limitations: Decreased functional mobility ; Decreased endurance;Decreased high-level IADLs;Decreased posture  Assessment: Pt will benefit from skilled PT while in the acute setting to address mobility and endurance deficits in order to return to optimal functional status. Pt performed sit to stand x 3 trials to perform toileting and was able to ambulate to the restroom and back. During prolonged standing pt would rest by placing forearms on the walker and lean forward due to reports of lumbar spine fatigue. Treatment Diagnosis: Functional decline  Prognosis: Good  Decision Making: Low Complexity  REQUIRES PT FOLLOW UP: Yes  Activity Tolerance  Activity Tolerance: Patient Tolerated treatment well       Patient Diagnosis(es): The primary encounter diagnosis was Acute respiratory failure with hypoxia and hypercapnia ([address] Diagnoses of COVID-19 virus antibody negative, Pneumonia due to organism, Elevated CK, and Acute metabolic encephalopathy were also pertinent to this visit. has a past medical history of Anxiety, B12 deficiency, Cancer ([address] Chronic constipation, DDD (degenerative disc disease), lumbar, Depression, Diabetes mellitus ([address] GERD (gastroesophageal reflux disease), Hyperlipidemia, Hypertension, and Lower back pain. has a past surgical history that includes Cholecystectomy and Prostatectomy (2018).     Restrictions  Restrictions/Precautions  Restrictions/Precautions: General Precautions, Fall Risk  Required Braces or Orthoses?: No     Vision/Hearing  Vision: Impaired  Vision Exceptions: Wears glasses at all times  Hearing: Within functional limits       Subjective  General  Chart Reviewed: Yes  Patient assessed for rehabilitation services?: Yes  Referring Practitioner: Germain Merlin, PA-C  Referral Date : 02/06/21  Diagnosis: Acute encephalopathy  Subjective  Subjective: Pt presents supine in bed, agreeable to PT evaluation. Pain Screening  Patient Currently in Pain: Denies  Vital Signs  Patient Currently in Pain: Denies       Orientation  Orientation  Overall Orientation Status: Within Functional Limits(did not know the month, but knew year.   Also was oriented to person and place)     Social/Functional History  Social/Functional History  Lives With: Family(Pt lives with niece and her spouse)  Type of Home: Trailer  Home Layout: One level  Home Access: Ramped entrance  Bathroom Shower/Tub: Tub/Shower unit  Bathroom Toilet: Standard  Home Equipment: Standard walker, Cane  Receives Help From: Family  ADL Assistance: Independent  Homemaking Assistance: Independent  Homemaking Responsibilities: Yes(helps with household tasks like washing dishes.)  Ambulation Assistance: Independent(normally ambulates without AD, two days prior to admission began using standard walker)  Transfer Assistance: Independent  Active : No    Objective     Observation/Palpation  Posture: Fair  Observation: 2L 02, lying in bed, family present    AROM RLE (degrees)  RLE AROM: WNL  AROM LLE (degrees)  LLE AROM : WNL     Strength RLE  Strength RLE: WNL  Strength LLE  Strength LLE: WNL  Tone RLE  RLE Tone: Normotonic  Tone LLE  LLE Tone: Normotonic     Bed mobility  Rolling to Left: Modified independent  Supine to Sit: Stand by assistance  Sit to Supine: Contact guard assistance  Scooting: Modified independent     Transfers  Sit to Stand: Supervision  Stand to sit: Supervision  Bed to Chair: Contact guard assistance     Ambulation  Ambulation?: Yes  Ambulation 1  Surface: level tile  Device: Rolling Walker  Assistance: Contact guard assistance  Gait Deviations: Slow Brenda;Decreased step length;Decreased step height  Distance: 30 feet in room (to restroom and back)     Balance  Sitting - Static: Good  Sitting - Dynamic: Good  Standing - Static: Good;-(pt fatigues with standing and rests by placing forearms on his walker and bending forward)  Standing - Dynamic: Fair;+        Plan   Plan  Times per week: 4-5 days per week  Times per day: Daily  Plan weeks: 3 days  Current Treatment Recommendations: Strengthening, Transfer Training, Endurance Training, Patient/Caregiver Education & Training, Equipment Evaluation, Education, & procurement, ROM, Balance Training, Home Exercise Program, Safety Education & Training, Functional Mobility Training  Safety Devices  Type of devices: Bed alarm in place, Call light within reach, Left in bed      Goals  Short term goals  Time Frame for Short term goals: 3 days  Short term goal 1: Pt to ambulate 100 feet with RW x CGA with good safety awareness. Short term goal 2: Pt to perform bed mobility x Mod I  Short term goal 3: Pt to transfer bed to chair with RW x SBA       Therapy Time   Individual Concurrent Group Co-treatment   Time In 1233         Time Out 1308         Minutes [facility], PT       This note serves as D/C summary if patient is discharged prior to next visit.

## 2021-02-09 ENCOUNTER — HOSPITAL ENCOUNTER (INPATIENT)
Facility: HOSPITAL | Age: 61
LOS: 8 days | Discharge: HOME | DRG: 193 | End: 2021-02-17
Attending: INTERNAL MEDICINE | Admitting: INTERNAL MEDICINE
Payer: MEDICAID

## 2021-02-09 VITALS
HEART RATE: 64 BPM | SYSTOLIC BLOOD PRESSURE: 119 MMHG | HEIGHT: 63 IN | WEIGHT: 243.1 LBS | TEMPERATURE: 97.4 F | OXYGEN SATURATION: 94 % | DIASTOLIC BLOOD PRESSURE: 68 MMHG | BODY MASS INDEX: 43.07 KG/M2 | RESPIRATION RATE: 20 BRPM

## 2021-02-09 DIAGNOSIS — F41.9 ANXIETY: Primary | ICD-10-CM

## 2021-02-09 DIAGNOSIS — R91.1 PULMONARY NODULE: ICD-10-CM

## 2021-02-09 PROBLEM — R53.81 DECLINING FUNCTIONAL STATUS: Status: ACTIVE | Noted: 2021-02-09

## 2021-02-09 LAB
A/G RATIO: 1.3 (ref 0.8–2)
ALBUMIN SERPL-MCNC: 3.5 G/DL (ref 3.4–4.8)
ALP BLD-CCNC: 42 U/L (ref 25–100)
ALT SERPL-CCNC: 26 U/L (ref 4–36)
ANION GAP SERPL CALCULATED.3IONS-SCNC: 8 MMOL/L (ref 3–16)
AST SERPL-CCNC: 31 U/L (ref 8–33)
BILIRUB SERPL-MCNC: <0.2 MG/DL (ref 0.3–1.2)
BUN BLDV-MCNC: 25 MG/DL (ref 6–20)
CALCIUM SERPL-MCNC: 9.3 MG/DL (ref 8.5–10.5)
CHLORIDE BLD-SCNC: 98 MMOL/L (ref 98–107)
CO2: 29 MMOL/L (ref 20–30)
CREAT SERPL-MCNC: 1 MG/DL (ref 0.4–1.2)
GFR AFRICAN AMERICAN: >59
GFR NON-AFRICAN AMERICAN: >59
GLOBULIN: 2.8 G/DL
GLUCOSE BLD-MCNC: 102 MG/DL (ref 74–106)
GLUCOSE BLD-MCNC: 184 MG/DL (ref 74–106)
GLUCOSE BLD-MCNC: 263 MG/DL (ref 74–106)
GLUCOSE BLD-MCNC: 266 MG/DL (ref 74–106)
HCT VFR BLD CALC: 39.2 % (ref 40–54)
HEMOGLOBIN: 12.2 G/DL (ref 13–18)
MCH RBC QN AUTO: 29.8 PG (ref 27–32)
MCHC RBC AUTO-ENTMCNC: 31.1 G/DL (ref 31–35)
MCV RBC AUTO: 95.6 FL (ref 80–100)
PDW BLD-RTO: 12.3 % (ref 11–16)
PERFORMED ON: ABNORMAL
PERFORMED ON: ABNORMAL
PERFORMED ON: NORMAL
PLATELET # BLD: 317 K/UL (ref 150–400)
PMV BLD AUTO: 8.7 FL (ref 6–10)
POTASSIUM SERPL-SCNC: 4.7 MMOL/L (ref 3.4–5.1)
RBC # BLD: 4.1 M/UL (ref 4.5–6)
SODIUM BLD-SCNC: 135 MMOL/L (ref 136–145)
TOTAL CK: 1139 U/L (ref 26–174)
TOTAL PROTEIN: 6.3 G/DL (ref 6.4–8.3)
WBC # BLD: 5.6 K/UL (ref 4–11)

## 2021-02-09 PROCEDURE — 99238 HOSP IP/OBS DSCHRG MGMT 30/<: CPT | Performed by: INTERNAL MEDICINE

## 2021-02-09 PROCEDURE — 6370000000 HC RX 637 (ALT 250 FOR IP): Performed by: PHYSICIAN ASSISTANT

## 2021-02-09 PROCEDURE — 2580000003 HC RX 258: Performed by: PHYSICIAN ASSISTANT

## 2021-02-09 PROCEDURE — 80053 COMPREHEN METABOLIC PANEL: CPT

## 2021-02-09 PROCEDURE — 6360000002 HC RX W HCPCS: Performed by: PHYSICIAN ASSISTANT

## 2021-02-09 PROCEDURE — 94640 AIRWAY INHALATION TREATMENT: CPT

## 2021-02-09 PROCEDURE — 94660 CPAP INITIATION&MGMT: CPT

## 2021-02-09 PROCEDURE — 97530 THERAPEUTIC ACTIVITIES: CPT

## 2021-02-09 PROCEDURE — 94668 MNPJ CHEST WALL SBSQ: CPT

## 2021-02-09 PROCEDURE — 85027 COMPLETE CBC AUTOMATED: CPT

## 2021-02-09 PROCEDURE — 97161 PT EVAL LOW COMPLEX 20 MIN: CPT

## 2021-02-09 PROCEDURE — 1200000002 HC SEMI PRIVATE SWING BED

## 2021-02-09 PROCEDURE — 94761 N-INVAS EAR/PLS OXIMETRY MLT: CPT

## 2021-02-09 PROCEDURE — 36415 COLL VENOUS BLD VENIPUNCTURE: CPT

## 2021-02-09 PROCEDURE — 97165 OT EVAL LOW COMPLEX 30 MIN: CPT

## 2021-02-09 PROCEDURE — 82550 ASSAY OF CK (CPK): CPT

## 2021-02-09 RX ORDER — DEXTROSE MONOHYDRATE 50 MG/ML
100 INJECTION, SOLUTION INTRAVENOUS PRN
Status: DISCONTINUED | OUTPATIENT
Start: 2021-02-09 | End: 2021-02-17 | Stop reason: HOSPADM

## 2021-02-09 RX ORDER — DEXTROSE MONOHYDRATE 25 G/50ML
12.5 INJECTION, SOLUTION INTRAVENOUS PRN
Status: DISCONTINUED | OUTPATIENT
Start: 2021-02-09 | End: 2021-02-17 | Stop reason: HOSPADM

## 2021-02-09 RX ORDER — ASPIRIN 81 MG/1
81 TABLET, CHEWABLE ORAL DAILY
Status: DISCONTINUED | OUTPATIENT
Start: 2021-02-10 | End: 2021-02-17 | Stop reason: HOSPADM

## 2021-02-09 RX ORDER — GUAIFENESIN/DEXTROMETHORPHAN 100-10MG/5
5 SYRUP ORAL EVERY 4 HOURS PRN
Status: CANCELLED | OUTPATIENT
Start: 2021-02-09

## 2021-02-09 RX ORDER — FENOFIBRATE 54 MG/1
54 TABLET ORAL DAILY
Status: DISCONTINUED | OUTPATIENT
Start: 2021-02-10 | End: 2021-02-10

## 2021-02-09 RX ORDER — RISPERIDONE 0.5 MG/1
0.25 TABLET, FILM COATED ORAL 2 TIMES DAILY
Status: CANCELLED | OUTPATIENT
Start: 2021-02-09

## 2021-02-09 RX ORDER — IPRATROPIUM BROMIDE AND ALBUTEROL SULFATE 2.5; .5 MG/3ML; MG/3ML
1 SOLUTION RESPIRATORY (INHALATION) EVERY 4 HOURS PRN
Status: DISCONTINUED | OUTPATIENT
Start: 2021-02-09 | End: 2021-02-17 | Stop reason: HOSPADM

## 2021-02-09 RX ORDER — FENOFIBRATE 54 MG/1
54 TABLET ORAL DAILY
Status: CANCELLED | OUTPATIENT
Start: 2021-02-10

## 2021-02-09 RX ORDER — POLYETHYLENE GLYCOL 3350 17 G/17G
17 POWDER, FOR SOLUTION ORAL DAILY PRN
Status: DISCONTINUED | OUTPATIENT
Start: 2021-02-09 | End: 2021-02-17 | Stop reason: HOSPADM

## 2021-02-09 RX ORDER — NICOTINE POLACRILEX 4 MG
15 LOZENGE BUCCAL PRN
Status: CANCELLED | OUTPATIENT
Start: 2021-02-09

## 2021-02-09 RX ORDER — DEXTROSE MONOHYDRATE 25 G/50ML
12.5 INJECTION, SOLUTION INTRAVENOUS PRN
Status: CANCELLED | OUTPATIENT
Start: 2021-02-09

## 2021-02-09 RX ORDER — ONDANSETRON 2 MG/ML
4 INJECTION INTRAMUSCULAR; INTRAVENOUS EVERY 6 HOURS PRN
Status: DISCONTINUED | OUTPATIENT
Start: 2021-02-09 | End: 2021-02-17 | Stop reason: HOSPADM

## 2021-02-09 RX ORDER — PANTOPRAZOLE SODIUM 40 MG/1
40 TABLET, DELAYED RELEASE ORAL
Status: DISCONTINUED | OUTPATIENT
Start: 2021-02-10 | End: 2021-02-17 | Stop reason: HOSPADM

## 2021-02-09 RX ORDER — QUETIAPINE FUMARATE 100 MG/1
100 TABLET, FILM COATED ORAL NIGHTLY
Status: CANCELLED | OUTPATIENT
Start: 2021-02-09

## 2021-02-09 RX ORDER — ACETAMINOPHEN 325 MG/1
650 TABLET ORAL EVERY 4 HOURS PRN
Status: CANCELLED | OUTPATIENT
Start: 2021-02-09

## 2021-02-09 RX ORDER — ASPIRIN 81 MG/1
81 TABLET, CHEWABLE ORAL DAILY
Status: CANCELLED | OUTPATIENT
Start: 2021-02-10

## 2021-02-09 RX ORDER — NICOTINE 21 MG/24HR
1 PATCH, TRANSDERMAL 24 HOURS TRANSDERMAL DAILY
Status: DISCONTINUED | OUTPATIENT
Start: 2021-02-10 | End: 2021-02-17 | Stop reason: HOSPADM

## 2021-02-09 RX ORDER — AZITHROMYCIN 250 MG/1
250 TABLET, FILM COATED ORAL DAILY
Status: CANCELLED | OUTPATIENT
Start: 2021-02-10 | End: 2021-02-11

## 2021-02-09 RX ORDER — DIVALPROEX SODIUM 250 MG/1
250 TABLET, EXTENDED RELEASE ORAL NIGHTLY
Status: CANCELLED | OUTPATIENT
Start: 2021-02-09

## 2021-02-09 RX ORDER — POLYETHYLENE GLYCOL 3350 17 G/17G
17 POWDER, FOR SOLUTION ORAL DAILY PRN
Status: CANCELLED | OUTPATIENT
Start: 2021-02-09

## 2021-02-09 RX ORDER — GUAIFENESIN 600 MG/1
1200 TABLET, EXTENDED RELEASE ORAL 2 TIMES DAILY
Status: DISCONTINUED | OUTPATIENT
Start: 2021-02-09 | End: 2021-02-17 | Stop reason: HOSPADM

## 2021-02-09 RX ORDER — ACETAMINOPHEN 325 MG/1
650 TABLET ORAL EVERY 6 HOURS PRN
Status: DISCONTINUED | OUTPATIENT
Start: 2021-02-09 | End: 2021-02-17 | Stop reason: HOSPADM

## 2021-02-09 RX ORDER — BUDESONIDE 0.5 MG/2ML
0.5 INHALANT ORAL 2 TIMES DAILY
Status: CANCELLED | OUTPATIENT
Start: 2021-02-09

## 2021-02-09 RX ORDER — NICOTINE 21 MG/24HR
1 PATCH, TRANSDERMAL 24 HOURS TRANSDERMAL DAILY
Status: CANCELLED | OUTPATIENT
Start: 2021-02-10

## 2021-02-09 RX ORDER — DIAZEPAM 2 MG/1
2 TABLET ORAL EVERY 12 HOURS PRN
Status: DISCONTINUED | OUTPATIENT
Start: 2021-02-09 | End: 2021-02-17 | Stop reason: HOSPADM

## 2021-02-09 RX ORDER — PROMETHAZINE HYDROCHLORIDE 25 MG/1
12.5 TABLET ORAL EVERY 6 HOURS PRN
Status: CANCELLED | OUTPATIENT
Start: 2021-02-09

## 2021-02-09 RX ORDER — PREDNISONE 20 MG/1
40 TABLET ORAL DAILY
Status: CANCELLED | OUTPATIENT
Start: 2021-02-10 | End: 2021-02-13

## 2021-02-09 RX ORDER — OXYCODONE HYDROCHLORIDE 5 MG/1
5 TABLET ORAL EVERY 6 HOURS PRN
Status: CANCELLED | OUTPATIENT
Start: 2021-02-09

## 2021-02-09 RX ORDER — ACETAMINOPHEN 650 MG/1
650 SUPPOSITORY RECTAL EVERY 6 HOURS PRN
Status: CANCELLED | OUTPATIENT
Start: 2021-02-09

## 2021-02-09 RX ORDER — IPRATROPIUM BROMIDE AND ALBUTEROL SULFATE 2.5; .5 MG/3ML; MG/3ML
1 SOLUTION RESPIRATORY (INHALATION)
Status: DISCONTINUED | OUTPATIENT
Start: 2021-02-09 | End: 2021-02-09

## 2021-02-09 RX ORDER — PANTOPRAZOLE SODIUM 40 MG/1
40 TABLET, DELAYED RELEASE ORAL
Status: CANCELLED | OUTPATIENT
Start: 2021-02-10

## 2021-02-09 RX ORDER — DIAZEPAM 2 MG/1
2 TABLET ORAL EVERY 12 HOURS PRN
Status: CANCELLED | OUTPATIENT
Start: 2021-02-09

## 2021-02-09 RX ORDER — GLIPIZIDE 5 MG/1
5 TABLET ORAL
Status: DISCONTINUED | OUTPATIENT
Start: 2021-02-09 | End: 2021-02-10

## 2021-02-09 RX ORDER — PROMETHAZINE HYDROCHLORIDE 25 MG/1
12.5 TABLET ORAL EVERY 6 HOURS PRN
Status: DISCONTINUED | OUTPATIENT
Start: 2021-02-09 | End: 2021-02-17 | Stop reason: HOSPADM

## 2021-02-09 RX ORDER — TIZANIDINE 4 MG/1
4 TABLET ORAL 2 TIMES DAILY PRN
Status: CANCELLED | OUTPATIENT
Start: 2021-02-09

## 2021-02-09 RX ORDER — ACETAMINOPHEN 325 MG/1
650 TABLET ORAL EVERY 6 HOURS PRN
Status: CANCELLED | OUTPATIENT
Start: 2021-02-09

## 2021-02-09 RX ORDER — ALOGLIPTIN 12.5 MG/1
6.25 TABLET, FILM COATED ORAL DAILY
Status: DISCONTINUED | OUTPATIENT
Start: 2021-02-10 | End: 2021-02-17 | Stop reason: HOSPADM

## 2021-02-09 RX ORDER — AZITHROMYCIN 250 MG/1
250 TABLET, FILM COATED ORAL DAILY
Status: COMPLETED | OUTPATIENT
Start: 2021-02-10 | End: 2021-02-10

## 2021-02-09 RX ORDER — POLYETHYLENE GLYCOL 3350 17 G/17G
17 POWDER, FOR SOLUTION ORAL DAILY PRN
Status: DISCONTINUED | OUTPATIENT
Start: 2021-02-09 | End: 2021-02-09

## 2021-02-09 RX ORDER — GUAIFENESIN 600 MG/1
1200 TABLET, EXTENDED RELEASE ORAL 2 TIMES DAILY
Status: CANCELLED | OUTPATIENT
Start: 2021-02-09

## 2021-02-09 RX ORDER — DEXTROSE MONOHYDRATE 50 MG/ML
100 INJECTION, SOLUTION INTRAVENOUS PRN
Status: CANCELLED | OUTPATIENT
Start: 2021-02-09

## 2021-02-09 RX ORDER — DIVALPROEX SODIUM 250 MG/1
250 TABLET, EXTENDED RELEASE ORAL NIGHTLY
Status: DISCONTINUED | OUTPATIENT
Start: 2021-02-09 | End: 2021-02-17 | Stop reason: HOSPADM

## 2021-02-09 RX ORDER — DEXTROSE MONOHYDRATE 25 G/50ML
12.5 INJECTION, SOLUTION INTRAVENOUS PRN
Status: DISCONTINUED | OUTPATIENT
Start: 2021-02-09 | End: 2021-02-09 | Stop reason: HOSPADM

## 2021-02-09 RX ORDER — ONDANSETRON 2 MG/ML
4 INJECTION INTRAMUSCULAR; INTRAVENOUS EVERY 6 HOURS PRN
Status: CANCELLED | OUTPATIENT
Start: 2021-02-09

## 2021-02-09 RX ORDER — GUAIFENESIN/DEXTROMETHORPHAN 100-10MG/5
5 SYRUP ORAL EVERY 4 HOURS PRN
Status: DISCONTINUED | OUTPATIENT
Start: 2021-02-09 | End: 2021-02-17 | Stop reason: HOSPADM

## 2021-02-09 RX ORDER — GLIPIZIDE 10 MG/1
10 TABLET ORAL
Status: CANCELLED | OUTPATIENT
Start: 2021-02-09

## 2021-02-09 RX ORDER — OXYCODONE HYDROCHLORIDE 5 MG/1
5 TABLET ORAL EVERY 6 HOURS PRN
Status: DISCONTINUED | OUTPATIENT
Start: 2021-02-09 | End: 2021-02-17 | Stop reason: HOSPADM

## 2021-02-09 RX ORDER — PROPRANOLOL HYDROCHLORIDE 20 MG/1
60 TABLET ORAL 2 TIMES DAILY
Status: CANCELLED | OUTPATIENT
Start: 2021-02-09

## 2021-02-09 RX ORDER — ERGOCALCIFEROL 1.25 MG/1
50000 CAPSULE ORAL DAILY
Status: COMPLETED | OUTPATIENT
Start: 2021-02-10 | End: 2021-02-10

## 2021-02-09 RX ORDER — BUDESONIDE 0.5 MG/2ML
0.5 INHALANT ORAL 2 TIMES DAILY
Status: DISCONTINUED | OUTPATIENT
Start: 2021-02-09 | End: 2021-02-15

## 2021-02-09 RX ORDER — ACETAMINOPHEN 650 MG/1
650 SUPPOSITORY RECTAL EVERY 6 HOURS PRN
Status: DISCONTINUED | OUTPATIENT
Start: 2021-02-09 | End: 2021-02-17 | Stop reason: HOSPADM

## 2021-02-09 RX ORDER — SUCRALFATE 1 G/1
1 TABLET ORAL 3 TIMES DAILY
Status: CANCELLED | OUTPATIENT
Start: 2021-02-09

## 2021-02-09 RX ORDER — ERGOCALCIFEROL 1.25 MG/1
50000 CAPSULE ORAL DAILY
Status: CANCELLED | OUTPATIENT
Start: 2021-02-10 | End: 2021-02-11

## 2021-02-09 RX ORDER — NICOTINE POLACRILEX 4 MG
15 LOZENGE BUCCAL PRN
Status: DISCONTINUED | OUTPATIENT
Start: 2021-02-09 | End: 2021-02-17 | Stop reason: HOSPADM

## 2021-02-09 RX ORDER — PREDNISONE 20 MG/1
40 TABLET ORAL DAILY
Status: COMPLETED | OUTPATIENT
Start: 2021-02-10 | End: 2021-02-12

## 2021-02-09 RX ORDER — ACETAMINOPHEN 325 MG/1
650 TABLET ORAL EVERY 4 HOURS PRN
Status: DISCONTINUED | OUTPATIENT
Start: 2021-02-09 | End: 2021-02-09

## 2021-02-09 RX ORDER — ERGOCALCIFEROL 1.25 MG/1
50000 CAPSULE ORAL WEEKLY
Status: DISCONTINUED | OUTPATIENT
Start: 2021-02-15 | End: 2021-02-17 | Stop reason: HOSPADM

## 2021-02-09 RX ORDER — ALOGLIPTIN 12.5 MG/1
6.25 TABLET, FILM COATED ORAL DAILY
Status: CANCELLED | OUTPATIENT
Start: 2021-02-10

## 2021-02-09 RX ORDER — NICOTINE POLACRILEX 4 MG
15 LOZENGE BUCCAL PRN
Status: DISCONTINUED | OUTPATIENT
Start: 2021-02-09 | End: 2021-02-09 | Stop reason: HOSPADM

## 2021-02-09 RX ORDER — IPRATROPIUM BROMIDE AND ALBUTEROL SULFATE 2.5; .5 MG/3ML; MG/3ML
1 SOLUTION RESPIRATORY (INHALATION)
Status: CANCELLED | OUTPATIENT
Start: 2021-02-09

## 2021-02-09 RX ORDER — TIZANIDINE 4 MG/1
4 TABLET ORAL 2 TIMES DAILY PRN
Status: DISCONTINUED | OUTPATIENT
Start: 2021-02-09 | End: 2021-02-17 | Stop reason: HOSPADM

## 2021-02-09 RX ORDER — QUETIAPINE FUMARATE 100 MG/1
100 TABLET, FILM COATED ORAL NIGHTLY
Status: DISCONTINUED | OUTPATIENT
Start: 2021-02-09 | End: 2021-02-17 | Stop reason: HOSPADM

## 2021-02-09 RX ORDER — PROPRANOLOL HYDROCHLORIDE 20 MG/1
60 TABLET ORAL 2 TIMES DAILY
Status: DISCONTINUED | OUTPATIENT
Start: 2021-02-09 | End: 2021-02-10

## 2021-02-09 RX ORDER — DEXTROSE MONOHYDRATE 50 MG/ML
100 INJECTION, SOLUTION INTRAVENOUS PRN
Status: DISCONTINUED | OUTPATIENT
Start: 2021-02-09 | End: 2021-02-09 | Stop reason: HOSPADM

## 2021-02-09 RX ORDER — SUCRALFATE 1 G/1
1 TABLET ORAL 3 TIMES DAILY
Status: DISCONTINUED | OUTPATIENT
Start: 2021-02-09 | End: 2021-02-17 | Stop reason: HOSPADM

## 2021-02-09 RX ORDER — ERGOCALCIFEROL 1.25 MG/1
50000 CAPSULE ORAL WEEKLY
Status: CANCELLED | OUTPATIENT
Start: 2021-02-15

## 2021-02-09 RX ORDER — RISPERIDONE 0.5 MG/1
0.25 TABLET, FILM COATED ORAL 2 TIMES DAILY
Status: DISCONTINUED | OUTPATIENT
Start: 2021-02-09 | End: 2021-02-17 | Stop reason: HOSPADM

## 2021-02-09 RX ADMIN — IPRATROPIUM BROMIDE AND ALBUTEROL SULFATE 1 AMPULE: .5; 3 SOLUTION RESPIRATORY (INHALATION) at 05:10

## 2021-02-09 RX ADMIN — SUCRALFATE 1 G: 1 TABLET ORAL at 20:29

## 2021-02-09 RX ADMIN — IPRATROPIUM BROMIDE AND ALBUTEROL SULFATE 1 AMPULE: .5; 3 SOLUTION RESPIRATORY (INHALATION) at 13:52

## 2021-02-09 RX ADMIN — DIVALPROEX SODIUM 250 MG: 250 TABLET, FILM COATED, EXTENDED RELEASE ORAL at 20:29

## 2021-02-09 RX ADMIN — DIAZEPAM 2 MG: 2 TABLET ORAL at 20:29

## 2021-02-09 RX ADMIN — INSULIN LISPRO 3 UNITS: 100 INJECTION, SOLUTION INTRAVENOUS; SUBCUTANEOUS at 18:00

## 2021-02-09 RX ADMIN — GUAIFENESIN 1200 MG: 600 TABLET, EXTENDED RELEASE ORAL at 20:29

## 2021-02-09 RX ADMIN — GUAIFENESIN 1200 MG: 600 TABLET, EXTENDED RELEASE ORAL at 09:24

## 2021-02-09 RX ADMIN — TIZANIDINE 4 MG: 4 TABLET ORAL at 20:29

## 2021-02-09 RX ADMIN — ENOXAPARIN SODIUM 40 MG: 40 INJECTION, SOLUTION INTRAVENOUS; SUBCUTANEOUS at 18:00

## 2021-02-09 RX ADMIN — METFORMIN HYDROCHLORIDE 1000 MG: 500 TABLET ORAL at 18:00

## 2021-02-09 RX ADMIN — ALOGLIPTIN 6.25 MG: 12.5 TABLET, FILM COATED ORAL at 09:25

## 2021-02-09 RX ADMIN — DICLOFENAC SODIUM 2 G: 10 GEL TOPICAL at 09:25

## 2021-02-09 RX ADMIN — RISPERIDONE 0.25 MG: 0.5 TABLET ORAL at 20:29

## 2021-02-09 RX ADMIN — ERGOCALCIFEROL 50000 UNITS: 1.25 CAPSULE ORAL at 09:25

## 2021-02-09 RX ADMIN — PANTOPRAZOLE SODIUM 40 MG: 40 TABLET, DELAYED RELEASE ORAL at 06:42

## 2021-02-09 RX ADMIN — BUDESONIDE 500 MCG: 0.5 SUSPENSION RESPIRATORY (INHALATION) at 17:16

## 2021-02-09 RX ADMIN — PREDNISONE 40 MG: 20 TABLET ORAL at 09:24

## 2021-02-09 RX ADMIN — DICLOFENAC SODIUM 2 G: 10 GEL TOPICAL at 20:36

## 2021-02-09 RX ADMIN — PROPRANOLOL HYDROCHLORIDE 60 MG: 20 TABLET ORAL at 09:24

## 2021-02-09 RX ADMIN — INSULIN LISPRO 2 UNITS: 100 INJECTION, SOLUTION INTRAVENOUS; SUBCUTANEOUS at 20:33

## 2021-02-09 RX ADMIN — SERTRALINE HYDROCHLORIDE 100 MG: 50 TABLET ORAL at 09:24

## 2021-02-09 RX ADMIN — RISPERIDONE 0.25 MG: 0.5 TABLET ORAL at 09:25

## 2021-02-09 RX ADMIN — PROPRANOLOL HYDROCHLORIDE 60 MG: 20 TABLET ORAL at 20:29

## 2021-02-09 RX ADMIN — GLIPIZIDE 10 MG: 10 TABLET ORAL at 06:42

## 2021-02-09 RX ADMIN — IPRATROPIUM BROMIDE AND ALBUTEROL SULFATE 1 AMPULE: .5; 3 SOLUTION RESPIRATORY (INHALATION) at 17:16

## 2021-02-09 RX ADMIN — GLIPIZIDE 5 MG: 5 TABLET ORAL at 18:03

## 2021-02-09 RX ADMIN — FLUCONAZOLE 200 MG: 100 TABLET ORAL at 09:24

## 2021-02-09 RX ADMIN — SUCRALFATE 1 G: 1 TABLET ORAL at 13:40

## 2021-02-09 RX ADMIN — BUDESONIDE 500 MCG: 0.5 SUSPENSION RESPIRATORY (INHALATION) at 05:10

## 2021-02-09 RX ADMIN — SUCRALFATE 1 G: 1 TABLET ORAL at 09:24

## 2021-02-09 RX ADMIN — CEFTRIAXONE 1000 MG: 1 INJECTION, POWDER, FOR SOLUTION INTRAMUSCULAR; INTRAVENOUS at 13:40

## 2021-02-09 RX ADMIN — QUETIAPINE FUMARATE 100 MG: 100 TABLET ORAL at 20:29

## 2021-02-09 RX ADMIN — FENOFIBRATE 54 MG: 54 TABLET, FILM COATED ORAL at 09:25

## 2021-02-09 RX ADMIN — ASPIRIN 81 MG: 81 TABLET, CHEWABLE ORAL at 09:25

## 2021-02-09 RX ADMIN — AZITHROMYCIN MONOHYDRATE 250 MG: 250 TABLET ORAL at 09:24

## 2021-02-09 NOTE — PROGRESS NOTES
[facility]   [facility]upational Therapy Initial Assessment  Date: 2021   Patient Name: Catrachita Monge  MRN: [id number]     : 1960    Date of Service: 2021    Discharge Recommendations:  Continue to assess pending progress  OT Equipment Recommendations  Walker: Rolling    Assessment   Performance deficits / Impairments: Decreased cognition;Decreased ADL status; Decreased endurance;Decreased strength;Decreased high-level IADLs;Decreased functional mobility   Assessment: Pt agreeable to OT services. pt able to come to sit at EOB without difficulty. Pt transferred to chair with CGA<>SBA. Pt completed LB dressing with CGA. Pt did require increased level of assist (max) for donning socks on this date. Pt completed mini mental SE and SLUMS assessment. It should be of consideration that pt reports he is unable to read and write. On these occasions pt was asked to complete task verbally. Pt scored 9/30 on SLUMS assessment. Pt scored 19/30 on MMSE indicating abnormal given high school education and mild cognitive impairment. Pt left seated upright in chair with alarm on and call light in reach. Prognosis: Good  Decision Making: Low Complexity  REQUIRES OT FOLLOW UP: Yes  Activity Tolerance  Activity Tolerance: Patient Tolerated treatment well           Patient Diagnosis(es): There were no encounter diagnoses. has a past medical history of Anxiety, B12 deficiency, Cancer ([address] Chronic constipation, DDD (degenerative disc disease), lumbar, Depression, Diabetes mellitus ([address] GERD (gastroesophageal reflux disease), Hyperlipidemia, Hypertension, and Lower back pain. has a past surgical history that includes Cholecystectomy and Prostatectomy (2018).            Restrictions  Restrictions/Precautions  Restrictions/Precautions: General Precautions, Fall Risk  Required Braces or Orthoses?: No    Subjective   General  Chart Reviewed: Yes  Patient assessed for rehabilitation services?: Yes  Family / Caregiver Present: Yes  Referring Practitioner: Kathy Sparrow PA-C  Diagnosis: Acute respiratory failure with hypercapnia  Subjective  Subjective: Pt transitioned to swing bed program. Agreeable to OT services. Pt asking about when he can return home.   Patient Currently in Pain: Denies  Vital Signs  Patient Currently in Pain: Denies  Social/Functional History  Social/Functional History  Lives With: Family  Type of Home: Trailer  Home Layout: One level  Home Access: Ramped entrance  Bathroom Shower/Tub: Tub/Shower unit  Bathroom Toilet: Standard  Home Equipment: Standard walker, Cane  Receives Help From: Family  ADL Assistance: Independent  Homemaking Assistance: Independent  Homemaking Responsibilities: Yes  Ambulation Assistance: Independent  Transfer Assistance: Independent  Active : No       Objective   Vision: Impaired  Vision Exceptions: Wears glasses at all times  Hearing: Within functional limits    Orientation  Overall Orientation Status: Impaired  Orientation Level: Oriented to place;Oriented to person;Disoriented to time;Oriented to situation  Observation/Palpation  Posture: Fair  Observation: 2L 02, lying in bed,  Balance  Sitting Balance: Stand by assistance  Standing Balance: Contact guard assistance  ADL  LE Dressing: Contact guard assistance(max assist for socks)  Tone RUE  RUE Tone: Normotonic  Tone LUE  LUE Tone: Normotonic  Coordination  Movements Are Fluid And Coordinated: Yes     Bed mobility  Supine to Sit: Stand by assistance  Scooting: Modified independent  Transfers  Stand Pivot Transfers: Contact guard assistance;Stand by assistance  Sit to stand: Stand by assistance     Cognition  Cognition Comment: Cognitive delay, able to answer questions appropriately, able to follow 1-2 step commands        Sensation  Overall Sensation Status: WFL        LUE AROM (degrees)  LUE AROM : WFL  LUE General AROM: Pain with AROM secondary to recent fall  RUE AROM (degrees)  RUE AROM : [facility] Plan   Plan  Times per week: 3-5  Times per day: Daily  Plan weeks: 2  Current Treatment Recommendations: Strengthening, Balance Training, ROM, Functional Mobility Training, Endurance Training, Self-Care / ADL, Patient/Caregiver Education & Training    G-Code     OutComes Score                                                  AM-PAC Score             Goals  Short term goals  Time Frame for Short term goals: 1 week  Short term goal 1: Pt to complete toileting with MOD I. Short term goal 2: Pt to complete hygiene/grooming with MOD I. Short term goal 3: Pt to complete dressing with MOD I. Short term goal 4: Pt to complete bathing with MOD I. Short term goal 5: Pt to tolerate x15 minutes of activity to increase functional activity tolerance. Therapy Time   Individual Concurrent Group Co-treatment   Time In 0227         Time Out 0306         Minutes 39              This note serves as a DC summary in the event of pt discharge.      Elisa Nicholson, OTR/L

## 2021-02-09 NOTE — DISCHARGE SUMMARY
Following the Fleischner Society 2017 guidelines for single solid nodules >8 mm, if patient is low risk then consider CT at 3 months, PET/CT, or tissue sampling. If patient is high risk, then consider CT at 3 months, PET/CT, or tissue sampling.  qzxv      Diffuse tracheal narrowing may indicate tracheomalacia. Partial compression of L1 vertebral body of questionable significance may indicate age-indeterminate fracture. Coronary calcification. Otherwise no acute process identified given limitations of noncontrast evaluation. CT ABDOMEN PELVIS WO CONTRAST Additional Contrast? None   Final Result      Patchy parenchymal infiltrates seen in left lower lobe and lingular segment of left upper lobe suspicious for pneumonia. 1.1 cm pleural-based nodular density seen laterally in superior segment of left upper lobe. Following the Fleischner Society 2017 guidelines for single solid nodules >8 mm, if patient is low risk then consider CT at 3 months, PET/CT, or tissue sampling. If patient is high risk, then consider CT at 3 months, PET/CT, or tissue sampling.  qzxv      Diffuse tracheal narrowing may indicate tracheomalacia. Partial compression of L1 vertebral body of questionable significance may indicate age-indeterminate fracture. Coronary calcification. Otherwise no acute process identified given limitations of noncontrast evaluation. CT Head WO Contrast   Final Result      No evidence of acute intracranial abnormality.                      XR CHEST PORTABLE    (Results Pending)         Consults:   IP CONSULT TO CASE MANAGEMENT  IP CONSULT TO CASE MANAGEMENT  IP CONSULT TO CASE MANAGEMENT  Pt/ot    Briefly:   Mr. Willi Richey is a 60 yo male with PMH of developmental delay, obesity, DM II, HTN, anxiety, tobacco abuse, chronic compression fracture L1, and others who was admitted due to altered mental status, declining functional status, TYLER, and acute respiratory failure with [G93.40]  - in setting of hypercapnic respiratory failure, pneumonia, copd exacerbation, and possibly polypharmacy with multiple sedating meds   - treating as above for pneumonia   - decrease valium dose, decrease seroquel and risperdal doses 2/8   - mental status improved today - monitor closely  02/06/2021       Disposition: swing bed    Discharged Condition: Stable    Vital Signs  Temp: 97.4 °F (36.3 °C)  Pulse: 64  Resp: 20  BP: 119/68  SpO2: 94 %  O2 Device: Nasal cannula  O2 Flow Rate (L/min): 2 L/min    Vital signs reviewed in electronic chart. Physical exam  Constitutional:  Well developed, obese, sitting up in bed, no acute distress. Eyes:  PERRL, conjuncvtia normal, sclera without icterus. HENT:  Atraumatic, external ears normal, nose normal, oropharynx moist, no pharyngeal exudates. Neck- supple, no JVD, no lymphadenopathy. Respiratory:  No respiratory distress on 2L O2, no wheezing, rales or rhonchi detected. Cardiovascular:  Normal rate, normal rhythm, no murmurs, no gallops, no rubs. GI:  Soft, protuberant, normal bowel sounds, nontender, no hepatosplenomegaly appreciated. Musculoskeletal:  Trace edema LLE and LUE. No obvious acute deformity. Moving all extremities. Integument:  Warm and dry. No rash. Neurologic:  Alert & oriented to self, place, situation. Answers questions appropriately. no apparent focal deficits noted. Psychiatric:  Speech and behavior appropriate. Labs:  For convenience and continuity at follow-up the following most recent labs are provided:    CBC:   Lab Results   Component Value Date    WBC 5.6 02/09/2021    HGB 12.2 02/09/2021    HCT 39.2 02/09/2021     02/09/2021       RENAL:   Lab Results   Component Value Date     02/09/2021    K 4.7 02/09/2021    K 4.4 02/08/2021    CL 98 02/09/2021    CO2 29 02/09/2021    BUN 25 02/09/2021    CREATININE 1.0 02/09/2021         Discharge Medications:      Current Discharge Medication List           Details diazePAM (VALIUM) 10 MG tablet Take 10 mg by mouth every 12 hours as needed for Anxiety. guaiFENesin (MUCINEX) 600 MG extended release tablet Take 1,200 mg by mouth 2 times daily      risperiDONE (RISPERDAL) 0.5 MG tablet Take 0.5 mg by mouth 2 times daily      divalproex (DEPAKOTE ER) 250 MG extended release tablet Take 250 mg by mouth nightly      propranolol (INDERAL) 40 MG tablet Take 60 mg by mouth 2 times daily      sucralfate (CARAFATE) 1 GM tablet Take 1 g by mouth 3 times daily      linaCLOtide (LINZESS) 72 MCG CAPS capsule Take 72 mcg by mouth every morning (before breakfast)      metFORMIN (GLUCOPHAGE) 1000 MG tablet Take 1,000 mg by mouth 2 times daily (with meals)      SITagliptin (JANUVIA) 100 MG tablet Take 100 mg by mouth daily      pantoprazole sodium (PROTONIX) 40 MG PACK packet Take 40 mg by mouth every morning (before breakfast)      glipiZIDE (GLUCOTROL) 10 MG tablet Take 10 mg by mouth 2 times daily (before meals)      empagliflozin (JARDIANCE) 10 MG tablet Take 10 mg by mouth daily      aspirin 81 MG tablet Take 81 mg by mouth daily      fenofibrate 160 MG tablet Take 160 mg by mouth daily      lisinopril (PRINIVIL;ZESTRIL) 10 MG tablet Take 10 mg by mouth daily      sertraline (ZOLOFT) 100 MG tablet Take 100 mg by mouth daily      tiZANidine (ZANAFLEX) 4 MG tablet Take 4 mg by mouth 2 times daily as needed       atorvastatin (LIPITOR) 80 MG tablet Take 80 mg by mouth daily      QUEtiapine (SEROQUEL XR) 50 MG extended release tablet Take 200 mg by mouth nightly             Patient was seen and examined by Dr. Syble Crigler and plan of care reviewed. Signed:  Electronically signed by ISAURA Jackson on 2/9/2021 at 11:50 AM       Thank you Rupa Hassan for the opportunity to be involved in this patient's care. If you have any questions or concerns please feel free to contact me at [phone].

## 2021-02-09 NOTE — PROGRESS NOTES
3/9/0988    I certify that the skilled nursing and/or rehabilitation services are required to be given on a daily basis, which as a practical matter can only be provided in a skilled nursing unit on an inpatient basis.     Electronically signed by Dc King RN on 2/9/2021 at 1:11 PM

## 2021-02-09 NOTE — FLOWSHEET NOTE
02/08/21 2027   Assessment   Charting Type Shift assessment   Neurological   Neuro (WDL) X   Level of Consciousness Alert (0)   Orientation Level Oriented to person;Oriented to place;Oriented to time;Oriented to situation   Cognition JUDITH   Language JUDITH   R Hand  Strong   L Hand  Strong   [place] Coma Scale   Eye Opening 4   Best Verbal Response 5   Best Motor Response 6   [place] Coma Scale Score 15   HEENT   HEENT (WDL) WDL   Respiratory   Respiratory (WDL) X   Respiratory Quality/Effort Unlabored   Chest Assessment Chest expansion symmetrical   L Breath Sounds Rhonchi   R Breath Sounds Rhonchi   Cough/Sputum   Sputum How Obtained None   Cough Strong;Barky   Frequency Frequent   Sputum Amount None   Cardiac   Cardiac (WDL) WDL   Cardiac Regularity Regular   Cardiac Rhythm NSR   Cardiac Monitor   Telemetry Monitor On No   Gastrointestinal   Abdominal (WDL) X   Abdomen Inspection Rounded   RUQ Bowel Sounds Hypoactive   LUQ Bowel Sounds Hypoactive   RLQ Bowel Sounds Hypoactive   LLQ Bowel Sounds Hypoactive   Peripheral Vascular   Peripheral Vascular (WDL) WDL   Edema Other (Comment)  (scrotum)   Skin Color/Condition   Skin Color/Condition (WDL) WDL   Skin Integrity   Skin Integrity (WDL) X   Skin Integrity Redness   Location scrotum   Skin Integrity Site 2   Skin Integrity Location 2 Redness   Location 2 k   Genitourinary   Genitourinary (WDL) X   Urine Assessment   Incontinence Yes   Genitalia   Male Genitalia Uncircumcised; Enlarged scrotum  (redness)   Anus/Rectum   Anus/Rectum (WDL) WDL   Psychosocial   Psychosocial (WDL) WDL

## 2021-02-09 NOTE — FLOWSHEET NOTE
02/09/21 1000   Assessment   Charting Type Shift assessment   Neurological   Neuro (WDL) X   Level of Consciousness Alert (0)   Orientation Level Oriented to person;Oriented to place;Oriented to time;Oriented to situation   Cognition JUDITH   Language JUDITH   R Hand  Strong   L Hand  Strong   Pelon Coma Scale   Eye Opening 4   Best Verbal Response 5   Best Motor Response 6   [place] Coma Scale Score 15   HEENT   HEENT (WDL) WDL   Respiratory   Respiratory (WDL) X   Respiratory Pattern Regular   Respiratory Depth Normal   Respiratory Quality/Effort Unlabored   Chest Assessment Chest expansion symmetrical   L Breath Sounds Rhonchi;Diminished   R Breath Sounds Rhonchi;Diminished   Breath Sounds   Right Upper Lobe Rhonchi   Right Middle Lobe Diminished   Right Lower Lobe Diminished   Left Upper Lobe Rhonchi   Left Lower Lobe Diminished   Cough/Sputum   Sputum How Obtained None   Cough Non-productive   Sputum Amount None   Cardiac   Cardiac (WDL) WDL   Cardiac Regularity Regular   Cardiac Rhythm NSR   Cardiac Monitor   Telemetry Monitor On No   Gastrointestinal   Abdominal (WDL) X   Abdomen Inspection Rounded   RUQ Bowel Sounds Active   LUQ Bowel Sounds Active   RLQ Bowel Sounds Active   LLQ Bowel Sounds Active   Peripheral Vascular   Peripheral Vascular (WDL) WDL   Edema Other (Comment)  (scrotum)   Skin Color/Condition   Skin Color/Condition (WDL) WDL   Skin Integrity   Skin Integrity (WDL) X   Skin Integrity Redness   Location scrotum   Multiple Skin Integrity Sites Yes   Skin Integrity Site 2   Skin Integrity Location 2 Redness; Abrasion   Location 2 left upper back   Musculoskeletal   Musculoskeletal (WDL) X   RUE Full movement   LUE Full movement   RL Extremity Weakness; Unsteady   LL Extremity Weakness; Unsteady   Genitourinary   Genitourinary (WDL) X   Urine Assessment   Incontinence Yes   Psychosocial   Psychosocial (WDL) WDL     Patient resting in bed with eyes closed.  Patient easily woke to verbal stimuli. Patient oriented to person, place, but having difficulty with time. Patient easily oriented. Took am medications without difficulty. Fang Barrett at bedside. Call light placed within reach. Patient was instructed to call out when needing assistance, understanding verbalized. Bed alarm turned on for increased safety. Will continue to monitor.

## 2021-02-09 NOTE — PROGRESS NOTES
Physical Therapy    Facility/Department: [facility] MED SURG  Initial Assessment - Swing Bed    NAME: Lloyd Pulliam  : 1960  MRN: [id number]    Date of Service: 2021    Discharge Recommendations:  Continue to assess pending progress, Home with assist PRN        Assessment   Body structures, Functions, Activity limitations: Decreased functional mobility ; Decreased endurance;Decreased high-level IADLs;Decreased posture  Assessment: Functional decline; patient will benefit from PT to help improve safety, functional independence, and return to pre-illness functional status. Treatment Diagnosis: Functional decline  Prognosis: Good  Decision Making: Low Complexity  REQUIRES PT FOLLOW UP: Yes  Activity Tolerance  Activity Tolerance: Patient Tolerated treatment well       Patient Diagnosis(es): There were no encounter diagnoses. has a past medical history of Anxiety, B12 deficiency, Cancer ([address] Chronic constipation, DDD (degenerative disc disease), lumbar, Depression, Diabetes mellitus ([address] GERD (gastroesophageal reflux disease), Hyperlipidemia, Hypertension, and Lower back pain. has a past surgical history that includes Cholecystectomy and Prostatectomy (2018). Restrictions  Restrictions/Precautions  Restrictions/Precautions: General Precautions, Fall Risk  Required Braces or Orthoses?: No  Vision/Hearing  Vision: Impaired  Vision Exceptions: Wears glasses at all times  Hearing: Within functional limits     Subjective  General  Chart Reviewed: Yes  Patient assessed for rehabilitation services?: Yes  Referring Practitioner: Naga May MD  Referral Date : 21  Diagnosis: Acute encephalopathy  Subjective  Subjective: Pt presents supine in bed, agreeable to PT evaluation.   Pain Screening  Patient Currently in Pain: Denies  Vital Signs  Patient Currently in Pain: Denies       Orientation  Orientation  Overall Orientation Status: Within Functional Limits  Social/Functional History  Social/Functional History  Lives With: Family  Type of Home: Trailer  Home Layout: One level  Home Access: Ramped entrance  Bathroom Shower/Tub: Tub/Shower unit  Bathroom Toilet: Standard  Home Equipment: Standard walker, Cane  Receives Help From: Family  ADL Assistance: Independent  Homemaking Assistance: Independent  Homemaking Responsibilities: Yes  Ambulation Assistance: Independent  Transfer Assistance: Independent  Active : No  Cognition        Objective     Observation/Palpation  Posture: Fair  Observation: 2L 02, lying in bed, family present    AROM RLE (degrees)  RLE AROM: WFL  AROM LLE (degrees)  LLE AROM : WFL  AROM RUE (degrees)  RUE AROM : WFL  AROM LUE (degrees)  LUE AROM : WFL  Strength RLE  Strength RLE: WFL  Strength LLE  Strength LLE: WFL  Strength RUE  Strength RUE: WFL  Strength LUE  Strength LUE: WFL  Tone RLE  RLE Tone: Normotonic  Tone LLE  LLE Tone: Normotonic  Motor Control  Gross Motor?: WFL  Sensation  Overall Sensation Status: WFL  Bed mobility  Rolling to Left: Modified independent  Supine to Sit: Stand by assistance  Sit to Supine: Contact guard assistance  Scooting: Modified independent  Transfers  Sit to Stand: Stand by assistance  Stand to sit: Stand by assistance  Bed to Chair: Contact guard assistance  Ambulation  Ambulation?: Yes  Ambulation 1  Surface: level tile  Device: Rolling Walker  Assistance: Contact guard assistance  Gait Deviations: Slow Brenda;Decreased step length;Decreased step height  Distance: 15 feet in room; mildly impulsive upon getting up     Balance  Posture: Fair  Sitting - Static: Good  Sitting - Dynamic: Good  Standing - Static: Good;-  Standing - Dynamic: [address]  Times per week: 4-5 days per week  Plan weeks: 7-10 days  Current Treatment Recommendations: Strengthening, Transfer Training, Endurance Training, Patient/Caregiver Education & Training, Equipment Evaluation, Education, & procurement, ROM, Balance Training, Home Exercise Program, Safety Education & Training, Functional Mobility Training, Neuromuscular Re-education, Gait Training               Goals  Short term goals  Short term goal 3: Pt to transfer bed to chair with RW x SBA  Long term goals  Time Frame for Long term goals : 7-10 days  Long term goal 1: Pt to ambulate 100 feet with RW x CGA with good safety awareness. Long term goal 2: Pt to perform bed mobility x Mod I  Long term goal 3: Pt to transfer bed to chair with RW x SBA  Long term goal 4: Patient to demonstrate good safety awareness with functional mobility activities. Therapy Time   Individual Concurrent Group Co-treatment   Time In           Time Out           Minutes                   Brittany Brown PT       Certification of Medical Necessity: It will be understood that this treatment plan is certified medically necessary by the documenting therapist and referring physician mentioned in this report. Unless the physician indicated otherwise through written correspondence with our office, all further referrals will act as certification of medical necessity on this treatment plan. Thank you for this referral.  If you have questions regarding this plan of care, please call [phone].           Revisions to this plan (optional):                     Please sign and return this plan to:   FAX: [phone]      Signature:                                 Date:

## 2021-02-10 ENCOUNTER — APPOINTMENT (OUTPATIENT)
Dept: GENERAL RADIOLOGY | Facility: HOSPITAL | Age: 61
DRG: 193 | End: 2021-02-10
Attending: INTERNAL MEDICINE
Payer: MEDICAID

## 2021-02-10 LAB
ANION GAP SERPL CALCULATED.3IONS-SCNC: 9 MMOL/L (ref 3–16)
BASOPHILS ABSOLUTE: 0 K/UL (ref 0–0.1)
BASOPHILS RELATIVE PERCENT: 0.2 %
BLOOD CULTURE, ROUTINE: NORMAL
BUN BLDV-MCNC: 23 MG/DL (ref 6–20)
CALCIUM SERPL-MCNC: 9.2 MG/DL (ref 8.5–10.5)
CHLORIDE BLD-SCNC: 98 MMOL/L (ref 98–107)
CO2: 30 MMOL/L (ref 20–30)
CREAT SERPL-MCNC: 1 MG/DL (ref 0.4–1.2)
CULTURE, BLOOD 2: NORMAL
EOSINOPHILS ABSOLUTE: 0 K/UL (ref 0–0.4)
EOSINOPHILS RELATIVE PERCENT: 0.5 %
GFR AFRICAN AMERICAN: >59
GFR NON-AFRICAN AMERICAN: >59
GLUCOSE BLD-MCNC: 101 MG/DL (ref 74–106)
GLUCOSE BLD-MCNC: 262 MG/DL (ref 74–106)
GLUCOSE BLD-MCNC: 308 MG/DL (ref 74–106)
GLUCOSE BLD-MCNC: 74 MG/DL (ref 74–106)
GLUCOSE BLD-MCNC: 80 MG/DL (ref 74–106)
HCT VFR BLD CALC: 39.2 % (ref 40–54)
HEMOGLOBIN: 11.9 G/DL (ref 13–18)
IMMATURE GRANULOCYTES #: 0 K/UL
IMMATURE GRANULOCYTES %: 0.4 % (ref 0–5)
LYMPHOCYTES ABSOLUTE: 1.9 K/UL (ref 1.5–4)
LYMPHOCYTES RELATIVE PERCENT: 33.7 %
MCH RBC QN AUTO: 29.2 PG (ref 27–32)
MCHC RBC AUTO-ENTMCNC: 30.4 G/DL (ref 31–35)
MCV RBC AUTO: 96.3 FL (ref 80–100)
MONOCYTES ABSOLUTE: 0.5 K/UL (ref 0.2–0.8)
MONOCYTES RELATIVE PERCENT: 9.4 %
NEUTROPHILS ABSOLUTE: 3.2 K/UL (ref 2–7.5)
NEUTROPHILS RELATIVE PERCENT: 55.8 %
PDW BLD-RTO: 12.5 % (ref 11–16)
PERFORMED ON: ABNORMAL
PERFORMED ON: ABNORMAL
PERFORMED ON: NORMAL
PERFORMED ON: NORMAL
PLATELET # BLD: 314 K/UL (ref 150–400)
PMV BLD AUTO: 8.6 FL (ref 6–10)
POTASSIUM REFLEX MAGNESIUM: 4.3 MMOL/L (ref 3.4–5.1)
RBC # BLD: 4.07 M/UL (ref 4.5–6)
SODIUM BLD-SCNC: 137 MMOL/L (ref 136–145)
TOTAL CK: 523 U/L (ref 26–174)
WBC # BLD: 5.6 K/UL (ref 4–11)

## 2021-02-10 PROCEDURE — 92610 EVALUATE SWALLOWING FUNCTION: CPT

## 2021-02-10 PROCEDURE — 6360000002 HC RX W HCPCS: Performed by: PHYSICIAN ASSISTANT

## 2021-02-10 PROCEDURE — 97110 THERAPEUTIC EXERCISES: CPT

## 2021-02-10 PROCEDURE — 71045 X-RAY EXAM CHEST 1 VIEW: CPT

## 2021-02-10 PROCEDURE — 85025 COMPLETE CBC W/AUTO DIFF WBC: CPT

## 2021-02-10 PROCEDURE — 86480 TB TEST CELL IMMUN MEASURE: CPT

## 2021-02-10 PROCEDURE — 97116 GAIT TRAINING THERAPY: CPT

## 2021-02-10 PROCEDURE — 97802 MEDICAL NUTRITION INDIV IN: CPT

## 2021-02-10 PROCEDURE — 80048 BASIC METABOLIC PNL TOTAL CA: CPT

## 2021-02-10 PROCEDURE — 97535 SELF CARE MNGMENT TRAINING: CPT

## 2021-02-10 PROCEDURE — 2580000003 HC RX 258: Performed by: PHYSICIAN ASSISTANT

## 2021-02-10 PROCEDURE — 36415 COLL VENOUS BLD VENIPUNCTURE: CPT

## 2021-02-10 PROCEDURE — 94660 CPAP INITIATION&MGMT: CPT

## 2021-02-10 PROCEDURE — 1200000002 HC SEMI PRIVATE SWING BED

## 2021-02-10 PROCEDURE — 99305 1ST NF CARE MODERATE MDM 35: CPT | Performed by: INTERNAL MEDICINE

## 2021-02-10 PROCEDURE — 82550 ASSAY OF CK (CPK): CPT

## 2021-02-10 PROCEDURE — 6370000000 HC RX 637 (ALT 250 FOR IP): Performed by: PHYSICIAN ASSISTANT

## 2021-02-10 RX ORDER — PROPRANOLOL HYDROCHLORIDE 20 MG/1
40 TABLET ORAL 2 TIMES DAILY
Status: DISCONTINUED | OUTPATIENT
Start: 2021-02-10 | End: 2021-02-10

## 2021-02-10 RX ORDER — FUROSEMIDE 10 MG/ML
20 INJECTION INTRAMUSCULAR; INTRAVENOUS ONCE
Status: COMPLETED | OUTPATIENT
Start: 2021-02-10 | End: 2021-02-10

## 2021-02-10 RX ORDER — GLIPIZIDE 5 MG/1
2.5 TABLET ORAL
Status: DISCONTINUED | OUTPATIENT
Start: 2021-02-10 | End: 2021-02-11

## 2021-02-10 RX ORDER — PROPRANOLOL HYDROCHLORIDE 20 MG/1
20 TABLET ORAL 2 TIMES DAILY
Status: DISCONTINUED | OUTPATIENT
Start: 2021-02-11 | End: 2021-02-17 | Stop reason: HOSPADM

## 2021-02-10 RX ADMIN — SUCRALFATE 1 G: 1 TABLET ORAL at 08:52

## 2021-02-10 RX ADMIN — DIAZEPAM 2 MG: 2 TABLET ORAL at 20:11

## 2021-02-10 RX ADMIN — PREDNISONE 40 MG: 20 TABLET ORAL at 08:52

## 2021-02-10 RX ADMIN — RISPERIDONE 0.25 MG: 0.5 TABLET ORAL at 20:11

## 2021-02-10 RX ADMIN — RISPERIDONE 0.25 MG: 0.5 TABLET ORAL at 08:53

## 2021-02-10 RX ADMIN — ASPIRIN 81 MG: 81 TABLET, CHEWABLE ORAL at 08:53

## 2021-02-10 RX ADMIN — GUAIFENESIN 1200 MG: 600 TABLET, EXTENDED RELEASE ORAL at 08:52

## 2021-02-10 RX ADMIN — DICLOFENAC SODIUM 2 G: 10 GEL TOPICAL at 08:53

## 2021-02-10 RX ADMIN — ERGOCALCIFEROL 50000 UNITS: 1.25 CAPSULE ORAL at 08:54

## 2021-02-10 RX ADMIN — METFORMIN HYDROCHLORIDE 1000 MG: 500 TABLET ORAL at 15:51

## 2021-02-10 RX ADMIN — METFORMIN HYDROCHLORIDE 1000 MG: 500 TABLET ORAL at 08:52

## 2021-02-10 RX ADMIN — AZITHROMYCIN MONOHYDRATE 250 MG: 250 TABLET ORAL at 08:53

## 2021-02-10 RX ADMIN — INSULIN LISPRO 2 UNITS: 100 INJECTION, SOLUTION INTRAVENOUS; SUBCUTANEOUS at 20:13

## 2021-02-10 RX ADMIN — GLIPIZIDE 2.5 MG: 5 TABLET ORAL at 15:52

## 2021-02-10 RX ADMIN — SUCRALFATE 1 G: 1 TABLET ORAL at 15:44

## 2021-02-10 RX ADMIN — QUETIAPINE FUMARATE 100 MG: 100 TABLET ORAL at 20:11

## 2021-02-10 RX ADMIN — ENOXAPARIN SODIUM 40 MG: 40 INJECTION, SOLUTION INTRAVENOUS; SUBCUTANEOUS at 15:55

## 2021-02-10 RX ADMIN — FUROSEMIDE 20 MG: 10 INJECTION, SOLUTION INTRAMUSCULAR; INTRAVENOUS at 11:43

## 2021-02-10 RX ADMIN — SUCRALFATE 1 G: 1 TABLET ORAL at 20:10

## 2021-02-10 RX ADMIN — PANTOPRAZOLE SODIUM 40 MG: 40 TABLET, DELAYED RELEASE ORAL at 06:29

## 2021-02-10 RX ADMIN — ALOGLIPTIN 6.25 MG: 12.5 TABLET, FILM COATED ORAL at 08:52

## 2021-02-10 RX ADMIN — DIVALPROEX SODIUM 250 MG: 250 TABLET, FILM COATED, EXTENDED RELEASE ORAL at 20:11

## 2021-02-10 RX ADMIN — GUAIFENESIN 1200 MG: 600 TABLET, EXTENDED RELEASE ORAL at 20:11

## 2021-02-10 RX ADMIN — CEFTRIAXONE 1000 MG: 1 INJECTION, POWDER, FOR SOLUTION INTRAMUSCULAR; INTRAVENOUS at 15:44

## 2021-02-10 RX ADMIN — INSULIN LISPRO 3 UNITS: 100 INJECTION, SOLUTION INTRAVENOUS; SUBCUTANEOUS at 15:53

## 2021-02-10 RX ADMIN — SERTRALINE HYDROCHLORIDE 100 MG: 50 TABLET ORAL at 08:53

## 2021-02-10 ASSESSMENT — PAIN SCALES - GENERAL: PAINLEVEL_OUTOF10: 0

## 2021-02-10 NOTE — PROGRESS NOTES
Patient in chair without oxygen. He reports that he removed it because his nose was hurting. Per providers orders, wean from oxygen today. O2 SAT 95% on RA, oxygen off for >15 minutes. Will leave off for now and check again in 30 mins-1 hour.

## 2021-02-10 NOTE — PROGRESS NOTES
Occupational Therapy  Facility/Department: [facility] MED SURG  Daily Treatment Note  NAME: Arpita Hampton  : 1960  MRN: [id number]    Date of Service: 2/10/2021    Discharge Recommendations:  Continue to assess pending progress       Assessment   Assessment: Pt agreeable to OT services. Pt seated upright in recliner. Pt provided with HEP and educated on exercise technique requiring min verbal cuing. Pt tolerate x15 reps of ther ex using orange theraband. Pt completed oral hygiene and face shaving standing at sink with reports of fatigue in lower back. Pt able to tolerate standing ~10 minutes to complete task. Pt had episode of UI and doffed brief with SBA. Pt required CGA to initiate donning of brief and able to pull up with SBA. Pt completed meghan hygiene with SBA. Pt returned to bed with call light  and alarm on. Patient Diagnosis(es): There were no encounter diagnoses. has a past medical history of Anxiety, B12 deficiency, Cancer ([address] Chronic constipation, DDD (degenerative disc disease), lumbar, Depression, Diabetes mellitus ([address] GERD (gastroesophageal reflux disease), Hyperlipidemia, Hypertension, and Lower back pain. has a past surgical history that includes Cholecystectomy and Prostatectomy (2018). Restrictions  Restrictions/Precautions  Restrictions/Precautions: General Precautions, Fall Risk  Required Braces or Orthoses?: No  Subjective   General  Chart Reviewed: Yes  Patient assessed for rehabilitation services?: Yes  Family / Caregiver Present: Yes  Referring Practitioner: Víctor Womack PA-C  Diagnosis: Acute respiratory failure with hypercapnia  Subjective  Subjective: Pt transitioned to swing bed program. Agreeable to OT services. Pt asking about when he can return home.       Orientation     Objective    ADL  Grooming: Supervision  LE Bathing: Stand by assistance  LE Dressing: Contact guard assistance  Toileting: (incontinent)        Functional Mobility  Functional - Mobility Device: Rolling Walker  Activity: To/from bathroom  Assist Level: Stand by assistance  Bed mobility  Sit to Supine: Supervision  Scooting: Supervision  Transfers  Stand Pivot Transfers: Stand by assistance  Sit to stand: Stand by assistance                                            Type of ROM/Therapeutic Exercise  Type of ROM/Therapeutic Exercise: Resistive Bands  Comment: x15  Exercises  Scapular Retraction: x15  Shoulder Flexion: x15  Shoulder Extension: x15  Horizontal ABduction: x15  Elbow Flexion: x15  Elbow Extension: x15                    Plan   Plan  Times per week: 3-5  Times per day: Daily  Plan weeks: 2  Current Treatment Recommendations: Strengthening, Balance Training, ROM, Functional Mobility Training, Endurance Training, Self-Care / ADL, Patient/Caregiver Education & Training    Goals  Short term goals  Time Frame for Short term goals: 1 week  Short term goal 1: Pt to complete toileting with MOD I. Short term goal 2: Pt to complete hygiene/grooming with MOD I. Short term goal 3: Pt to complete dressing with MOD I. Short term goal 4: Pt to complete bathing with MOD I. Short term goal 5: Pt to tolerate x15 minutes of activity to increase functional activity tolerance. Therapy Time   Individual Concurrent Group Co-treatment   Time In 0122         Time Out 6791         Minutes 42              This note serves as a DC summary in the event of pt discharge.      Elisa Nicholson OTR/L

## 2021-02-10 NOTE — H&P
History and Physical    Patient:  Sierra Capellan    CHIEF COMPLAINT:    Declining functional status  Pneumonia     HISTORY OF PRESENT ILLNESS:   The patient is a 61 y.o. male with PMH of recurrent prostate cancer, HTN, chronic pain, anxiety, developmental delay, suspected COPD, DM II, tobacco abuse, and others who was admitted to swing bed due to declining functional status. Initially admitted to acute care 2/6-2/9 for acute respiratory failure, altered mental status, acute renal failure, rhabdomyolysis, hyperkalemia, pneumonia. Transition to swing bed for ongoing treatment for pneumonia and subacute rehabilitation for declining functional status. At time of exam today patient is resting in bed. States he feels sleepy this morning but having appropriate conversation. Did take valium and zanaflex last night according to mar. States overall feeling much better. Coughing occasionally and states he is getting choked on water and other drinks at times which makes cough worse. Shoulder pain and myalgias improving. He hopes with improvement in strength he will be discharged home soon. Currently on 2L and does not use o2 at home. Past Medical History:      Diagnosis Date    Anxiety     B12 deficiency     [address] ([facility]     Prostate    Chronic constipation     DDD (degenerative disc disease), lumbar     Depression     Diabetes mellitus (HCC)     GERD (gastroesophageal reflux disease)     Hyperlipidemia     Hypertension     Lower back pain        Past Surgical History:      Procedure Laterality Date    CHOLECYSTECTOMY      PROSTATECTOMY  2018       Medications Prior to Admission:    Prior to Admission medications    Medication Sig Start Date End Date Taking? Authorizing Provider   diazePAM (VALIUM) 10 MG tablet Take 10 mg by mouth every 12 hours as needed for Anxiety.     Historical Provider, MD   guaiFENesin (MUCINEX) 600 MG extended release tablet Take 1,200 mg by mouth 2 times daily    Historical Provider, MD   risperiDONE (RISPERDAL) 0.5 MG tablet Take 0.5 mg by mouth 2 times daily    Historical Provider, MD   divalproex (DEPAKOTE ER) 250 MG extended release tablet Take 250 mg by mouth nightly    Historical Provider, MD   propranolol (INDERAL) 40 MG tablet Take 60 mg by mouth 2 times daily    Historical Provider, MD   sucralfate (CARAFATE) 1 GM tablet Take 1 g by mouth 3 times daily    Historical Provider, MD   linaCLOtide (LINZESS) 72 MCG CAPS capsule Take 72 mcg by mouth every morning (before breakfast)    Historical Provider, MD   metFORMIN (GLUCOPHAGE) 1000 MG tablet Take 1,000 mg by mouth 2 times daily (with meals)    Historical Provider, MD   SITagliptin (JANUVIA) 100 MG tablet Take 100 mg by mouth daily    Historical Provider, MD   pantoprazole sodium (PROTONIX) 40 MG PACK packet Take 40 mg by mouth every morning (before breakfast)    Historical Provider, MD   glipiZIDE (GLUCOTROL) 10 MG tablet Take 10 mg by mouth 2 times daily (before meals)    Historical Provider, MD   empagliflozin (JARDIANCE) 10 MG tablet Take 10 mg by mouth daily    Historical Provider, MD   aspirin 81 MG tablet Take 81 mg by mouth daily    Historical Provider, MD   fenofibrate 160 MG tablet Take 160 mg by mouth daily    Historical Provider, MD   lisinopril (PRINIVIL;ZESTRIL) 10 MG tablet Take 10 mg by mouth daily    Historical Provider, MD   sertraline (ZOLOFT) 100 MG tablet Take 100 mg by mouth daily    Historical Provider, MD   tiZANidine (ZANAFLEX) 4 MG tablet Take 4 mg by mouth 2 times daily as needed     Historical Provider, MD   atorvastatin (LIPITOR) 80 MG tablet Take 80 mg by mouth daily    Historical Provider, MD   QUEtiapine (SEROQUEL XR) 50 MG extended release tablet Take 200 mg by mouth nightly     Historical Provider, MD       Allergies:  Patient has no known allergies. Social History:   TOBACCO:   reports that he has been smoking cigarettes. He has a 50.00 pack-year smoking history.  He has quit using smokeless tobacco.  ETOH:   reports no history of alcohol use. OCCUPATION:  None      Family History:   No family history on file. Review of system  Constitutional:  Denies fever or chills. Positive for generalized weakness. Eyes:  Denies eye pain or redness  HENT:  Denies nasal congestion or sore throat   Respiratory:  Denies  shortness of breath. Positive for cough. Cardiovascular:  Denies chest pain or edema   GI:  Denies abdominal pain, nausea, vomiting, bloody stools or diarrhea   :  Denies dysuria or frequency  Musculoskeletal:  Denies acute neck pain. Positive for myalgias and arthralgias- improving  Integument:  Denies rash or itching  Neurologic:  Denies headache, dizziness, numbness, tingling or unilateral weakness  Psychiatric:  Denies acute depression or acute anxiety      Vitals:    02/10/21 0924   BP: (!) 92/58   Pulse:    Resp:    Temp:    SpO2:        Physical exam  Constitutional:  Well developed, obese, sitting up in bed, no acute distress. Eyes:  PERRL, conjuncvtia normal, sclera without icterus. HENT:  Atraumatic, external ears normal, nose normal, oropharynx moist, no pharyngeal exudates. Neck- supple, no JVD, no lymphadenopathy. Respiratory:  No respiratory distress on 2L O2, no wheezing, rales or rhonchi detected. Cardiovascular:  Normal rate, normal rhythm, no murmurs, no gallops, no rubs. GI:  Soft, protuberant, normal bowel sounds, nontender, no hepatosplenomegaly appreciated. Musculoskeletal:  Trace edema BLE and LUE. No obvious acute deformity. Moving all extremities. Integument:  Warm and dry. No rash. Neurologic:  Alert & oriented to self, place, situation. Answers questions appropriately. no apparent focal deficits noted. Mild developmental delay noted. Psychiatric:  Speech and behavior appropriate.        Lab Results   Component Value Date     02/10/2021    K 4.3 02/10/2021    CL 98 02/10/2021    CO2 30 02/10/2021    BUN 23 (H) 02/10/2021    CREATININE 1.0 02/10/2021 GLUCOSE 80 02/10/2021    CALCIUM 9.2 02/10/2021    PROT 6.3 (L) 02/09/2021    LABALBU 3.5 02/09/2021    BILITOT <0.2 (L) 02/09/2021    ALKPHOS 42 02/09/2021    AST 31 02/09/2021    ALT 26 02/09/2021    LABGLOM >59 02/10/2021    GFRAA >59 02/10/2021    AGRATIO 1.3 02/09/2021    GLOB 2.8 02/09/2021           Lab Results   Component Value Date    WBC 5.6 02/10/2021    HGB 11.9 (L) 02/10/2021    HCT 39.2 (L) 02/10/2021    MCV 96.3 02/10/2021     02/10/2021         Assessment and Plan     Active Hospital Problems    Diagnosis Date Noted    Declining functional status [R53.81]  - Patient admitted to acute care due to acute respiratory failure with hypercapnia, pneumonia, acute renal failure, altered mental status, hyperkalemia  - clinically improving with current treatment however remains weak and transitioned to swing bed for  subacute rehab and ongoing treatment as below  - pt/ot consulted   - case management following for dc planning assistance 02/09/2021    Rhabdomyolysis [M62.82]  - CK on arrival 4029. Received IVFs in ED.   - Statin on hold. Fenofibrate on hold. - Repeat CK trending down - monitor  02/08/2021    Vitamin D deficiency [E55.9]  - continue vitamin d supplementation  02/08/2021    Pneumonia [J18.9]  - CT chest 2/6: Patchy parenchymal infiltrates seen in left lower lobe and lingular segment of left upper lobe suspicious for pneumonia  - Blood cultures Negative. Sputum culture ordered  - Continue treatment with IV Rocephin and po azithromycin  - Continue DuoNebs every 4 hours while awake and Pulmicort twice daily  - Encourage incentive spirometry and ambulation  - Flutter valve ordered   - Continue Mucinex and Robitussin  - Continue supplemental oxygen to maintain sats greater than 90%; at baseline, patient does not require oxygen.  Currently on 2 L nasal cannula.  Wean as tolerated.    - CXR 2/10 with micronodular pulmonary infiltrates better seen on recent ct scan favor atypical infection 02/07/2021    Obesity [X71.3]  - complicates all aspects of care   - encourage weight loss  - of note would benefit from sleep study as outpatient to evaluate for LEONEL 02/07/2021    Acute respiratory failure with hypercapnia (HCC) [J96.02]  - ABG upon arrival with evidence of acute respiratory failure with hypercapnia  - Suspect secondary to pneumonia. Also with suspected underlying copd and sleep apnea. - Improved with BiPAP; now on 2 L nasal cannula and will wean as tolerated  - Continue treatment for pneumonia as outlined above  - IV lasix 20 mg 2/10  - Continue oral prednisone 40 mg daily for 5 day total course 02/07/2021    Type 2 diabetes mellitus ([facility] [address] [E11.9]  - A1C 8.5  - resume home metformin since TYLER resolved  - decrease glipizide dosing  - continue nesina  - on jardiance at home however this is nonformulary at this facility so not currently receiving  - monitor accucheck achs and cover with low dose ssi as needed   - glucose 74 this AM - decrease glipizide to 2.5 mg bid and hold AM dose  02/07/2021    Pulmonary nodule [R91.1]  - CT chest 2/6: 1.1 cm pleural-based nodular density seen laterally in superior segment of left upper lobe.  Following the Fleischner Society 2017 guidelines for signal solid nodules greater than 8 mm, if patient is low risk then consider CT at 3 months, PET/CT or tissue sampling.  If patient is high risk, then consider CT at 3 months, PET/CT or switch to sampling.   - will need referral to pulmonology at discharge 02/07/2021    Tobacco abuse [Z72.0]  - Patient was strongly encouraged to stop smoking. Risks of continued smoking and benefits of smoking cessation were discussed. 02/07/2021    Hypertension [I10]  - BP slightly low this AM and propranolol dose decreased.    - monitor and adjust regimen as needed   02/07/2021   · Anxiety  - home valium 10 mg TID stopped with acute encephalopathy  - continue valium 2 mg bid prn anxiety (he is not using frequently per chart review)    Patient was seen and examined by Dr. Henny Greenberg and plan of care reviewed.       Electronically signed by ISAURA Gonzalez on 2/10/2021 at 10:12 AM

## 2021-02-10 NOTE — PROGRESS NOTES
Facility/Department: [facility] SURG   CLINICAL BEDSIDE SWALLOW EVALUATION    NAME: Arianna Hernandez  : 1960  MRN: [id number]    ADMISSION DATE: 2021  ADMITTING DIAGNOSIS: has Acute encephalopathy; Pneumonia; Obesity; TYLER (acute kidney injury) ([address] Hyperkalemia; Acute respiratory failure with hypercapnia ([address] Type 2 diabetes mellitus ([address] UTI (urinary tract infection); Hypertension; Anxiety; Left shoulder pain; Generalized weakness; Pulmonary nodule; Tobacco abuse; Compression fracture of L1 lumbar vertebra ([address] Rhabdomyolysis; Recurrent falls while walking; Vitamin D deficiency; and Declining functional status on their problem list.  ONSET DATE: 2020    Recent Chest Xray/CT of Chest: (02/10/2021)     Impression   Micronodular pulmonary infiltrates are better seen on the recent CT scan.     Favor atypical infection.         Date of Eval: 2/10/2021  Evaluating Therapist: Luis Anaya    Current Diet level:  Current Diet : Dysphagia Soft and Bite-Sized (Dysphagia III)  Current Liquid Diet : Thin    Primary Complaint  Patient Complaint: Per PA report, patient c/o choking at times during PO intake. Patient reported to SLP, \"I get choked when I drink while I'm laying back in the bed\". Pain:  None reported or indicated    Reason for Referral  Arianna Hernnadez was referred for a bedside swallow evaluation to assess the efficiency of his swallow function, identify signs and symptoms of aspiration and make recommendations regarding safe dietary consistencies, effective compensatory strategies, and safe eating environment. Impression  Dysphagia Diagnosis: Mild oral stage dysphagia  Dysphagia Impression : Min oral phase dysphagia  Dysphagia Outcome Severity Scale: Level 5: Mild dysphagia- Distant supervision. May need one diet consistency restricted     Treatment Plan  Requires SLP Intervention: Yes  Duration/Frequency of Treatment: 1-4x wk x 2 wks  D/C Recommendations:  To be determined       Recommended Diet and Intervention  Diet Solids Recommendation: Dysphagia Soft and Bite-Sized (Dysphagia III)  Liquid Consistency Recommendation: Thin  Recommended Form of Meds: PO     Therapeutic Interventions: Patient/Family education;Diet tolerance monitoring;Oral motor exercises; Bolus control exercises    Compensatory Swallowing Strategies  Compensatory Swallowing Strategies: Alternate solids and liquids;Small bites/sips;Eat/Feed slowly; Remain upright for 30-45 minutes after meals;Upright as possible for all oral intake;Swallow 2 times per bite/sip; External pacing    Treatment/Goals  Short-term Goals  Timeframe for Short-term Goals: 1 wk  Goal 1: Patient will tolerate least restrictive diet without overt s/sx aspiration 90% of the time. Goal 2: Patient/caregiver will demonstrate use/understanding of compensatory strategies/aspiration precaution guidelines with 90% acc. Goal 3: Patient will complete 3/3 oral motor exercises with 90% acc with min cues. Long-term Goals  Timeframe for Long-term Goals: 2 wks  Goal 1: Patient will consume safest and least restrictive diet without overt s/sx dysphagia 100% of the time to improve overall quality of life. General  Chart Reviewed: Yes  Subjective  Subjective: Patient upright in bed; pleasant and agreeable to ST eval. Patient reported \"I get choked when I drink while I'm laying back in the bed\". Behavior/Cognition: Alert; Cooperative;Pleasant mood  Communication Observation: Functional  Follows Directions: Simple  Dentition: Adequate  Patient Positioning: Upright in bed  Prior Dysphagia History: None reported or indicated. Consistencies Administered:  Thin - straw;Dysphagia Soft and Bite-Sized (Dysphagia III)    Vision/Hearing  Vision  Vision: Impaired  Vision Exceptions: Wears glasses at all times  Hearing  Hearing: Within functional limits    Oral Motor Deficits  Oral/Motor  Oral Motor: Exceptions to [facility]  Lingual Coordination: Reduced    Oral Phase Dysfunction  Oral Phase  Oral Phase: Exceptions  Oral Phase Dysfunction  Decreased Anterior to Posterior Transit: Soft solid  Oral Phase  Oral Phase - Comment: Patient consumed trials of soft and bite sized texture/thin liquids via straw. Patient presented with delay of oral transit time, however, cleared effectively. Patient required cues to reduce bite size and pace. Indicators of Pharyngeal Phase Dysfunction   Pharyngeal Phase  Pharyngeal Phase: WNL  Pharyngeal Phase   Pharyngeal: No deficits indicated    Prognosis  Prognosis  Prognosis for safe diet advancement: good  Barriers to reach goals: cognitive deficits  Barriers/Prognosis Comment: Good for recommended diet. Individuals consulted  Consulted and agree with results and recommendations: Patient;Dietitian    Education  Patient Education: Aspiration precaution guidelines; safe swallow strategies  Patient Education Response: Verbalizes understanding;Demonstrated understanding;Needs reinforcement  Safety Devices in place: Yes  Type of devices: Bed alarm in place; All fall risk precautions in place; Patient at risk for falls; Left in bed;Call light within reach       Therapy Time  SLP Individual Minutes  Time In: 1140  Time Out: 9254  Minutes: [address] [place], [place]  2/10/2021 3:52 PM

## 2021-02-10 NOTE — PROGRESS NOTES
Physical Therapy  Facility/Department: [facility] MED SURG  Daily Treatment Note  NAME: Iva Schirmer  : 1960  MRN: [id number]    Date of Service: 2/10/2021    Discharge Recommendations:  Continue to assess pending progress, Home with assist PRN      Assessment   Assessment: Patient's SpO2 99% on 2L. Patient able to remove supplemental O2 and complete bed mobility tasks with Mod I.  Stood with supervision and ambulated 110 feet with RW and CGA. SpO2 95%. Transferred to the recliner and completed B LE therex in sitting. SpO2 95%. Nursing notified of SpO2 without supplemental O2. Patient left up in recliner with call light in reach. Treatment Diagnosis: Functional decline  Prognosis: Good  REQUIRES PT FOLLOW UP: Yes  Activity Tolerance  Activity Tolerance: Patient Tolerated treatment well     Patient Diagnosis(es): There were no encounter diagnoses. has a past medical history of Anxiety, B12 deficiency, Cancer ([address] Chronic constipation, DDD (degenerative disc disease), lumbar, Depression, Diabetes mellitus ([address] GERD (gastroesophageal reflux disease), Hyperlipidemia, Hypertension, and Lower back pain. has a past surgical history that includes Cholecystectomy and Prostatectomy (2018). Restrictions  Restrictions/Precautions  Restrictions/Precautions: General Precautions, Fall Risk  Required Braces or Orthoses?: No  Subjective   General  Chart Reviewed: Yes  Response To Previous Treatment: Patient with no complaints from previous session. Family / Caregiver Present: No  Referring Practitioner: Epifanio Goode MD  Subjective  Subjective: Patient states he's doing ok today, offers no new c/o.   Pain Screening  Patient Currently in Pain: Denies  Vital Signs  Patient Currently in Pain: Denies       Orientation  Orientation  Overall Orientation Status: Within Functional Limits  Cognition      Objective   Bed mobility  Rolling to Left: Modified independent  Supine to Sit: Modified independent  Scooting: Modified independent  Transfers  Sit to Stand: Stand by assistance  Stand to sit: Stand by assistance  Bed to Chair: Stand by assistance(with RW)  Ambulation  Ambulation?: Yes  Ambulation 1  Surface: level tile  Device: Rolling Walker  Assistance: Contact guard assistance  Gait Deviations: Slow Brenda;Decreased step length;Decreased step height  Distance: 110 feet     Balance  Posture: Fair  Sitting - Static: Good  Sitting - Dynamic: Good  Standing - Static: Good;-  Standing - Dynamic: Fair;+  Exercises  Hip Flexion: 15  Hip Abduction: 15  Knee Long Arc Quad: 15  Ankle Pumps: 15      Goals  Short term goals  Short term goal 3: Pt to transfer bed to chair with RW x SBA  Long term goals  Time Frame for Long term goals : 7-10 days  Long term goal 1: Pt to ambulate 100 feet with RW x CGA with good safety awareness. Long term goal 2: Pt to perform bed mobility x Mod I  Long term goal 3: Pt to transfer bed to chair with RW x SBA  Long term goal 4: Patient to demonstrate good safety awareness with functional mobility activities. Plan    Plan  Times per week: 4-5 days per week  Plan weeks: 7-10 days  Current Treatment Recommendations: Strengthening, Transfer Training, Endurance Training, Patient/Caregiver Education & Training, Equipment Evaluation, Education, & procurement, ROM, Balance Training, Home Exercise Program, Safety Education & Training, Functional Mobility Training, Neuromuscular Re-education, Gait Training  Safety Devices  Type of devices: Left in chair, Chair alarm in place, Call light within reach     Therapy Time   Individual Concurrent Group Co-treatment   Time In 1225         Time Out 1250         Minutes 25              This note serves as D/C summary if patient is discharged prior to next visit.   Pauline Zhou, PTA

## 2021-02-10 NOTE — FLOWSHEET NOTE
02/10/21 1130   Assessment   Charting Type Shift assessment   Neurological   Neuro (WDL) X   Level of Consciousness Alert (0)   Orientation Level Oriented to person;Oriented to place;Oriented to time;Oriented to situation   Cognition Follows commands   Language Clear   R Hand  Strong   L Hand  Strong   [place] Coma Scale   Eye Opening 4   Best Verbal Response 5   Best Motor Response 6   [place] Coma Scale Score 15   HEENT   HEENT (WDL) WDL   Respiratory   Respiratory (WDL) X   Respiratory Pattern Regular   Respiratory Depth Normal   Respiratory Quality/Effort Unlabored   Chest Assessment Chest expansion symmetrical   L Breath Sounds Diminished; Expiratory Wheezes   R Breath Sounds Diminished; Expiratory Wheezes   Breath Sounds   Right Upper Lobe Expiratory Wheezes   Right Middle Lobe Diminished   Right Lower Lobe Diminished   Left Upper Lobe Diminished; Expiratory Wheezes   Left Lower Lobe Diminished   Cough/Sputum   Sputum How Obtained None   Cough Non-productive   Sputum Amount None   Cardiac   Cardiac (WDL) WDL   Cardiac Regularity Regular   Cardiac Monitor   Telemetry Monitor On No   Gastrointestinal   Abdominal (WDL) X   RUQ Bowel Sounds Active   LUQ Bowel Sounds Active   RLQ Bowel Sounds Active   LLQ Bowel Sounds Active   Abdomen Inspection Rounded   Peripheral Vascular   Peripheral Vascular (WDL) WDL   Edema Other (Comment)  (scrotum)   Skin Color/Condition   Skin Color/Condition (WDL) WDL   Skin Integrity   Skin Integrity (WDL) X   Skin Integrity Redness   Location Scrotum   Skin Integrity Site 2   Skin Integrity Location 2 Abrasion; Redness   Location 2 left upper back   Musculoskeletal   Musculoskeletal (WDL) X   RUE Full movement   LUE Full movement   RL Extremity Weakness; Unsteady   LL Extremity Weakness; Unsteady   Genitourinary   Genitourinary (WDL) X   Urine Assessment   Incontinence Yes   Genitalia   Male Genitalia Uncircumcised; Enlarged scrotum  (redness)   Anus/Rectum   Anus/Rectum (WDL) WDL

## 2021-02-10 NOTE — CONSULTS
Comprehensive Nutrition Assessment    Type and Reason for Visit:  Initial, Consult(new swing)    Nutrition Recommendations/Plan: Recommend ONS to help meet protein needs. Continue with current diet and encourage intakes. Nutrition Assessment:  Pt presents with declining functional status, low risk for malnutrition. Will monitor intakes, and weights. Malnutrition Assessment:  Malnutrition Status:  No malnutrition    Context:  Social/Environmental Circumstances     Findings of the 6 clinical characteristics of malnutrition:  Energy Intake:  Unable to assess  Weight Loss:  No significant weight loss     Body Fat Loss:  No significant body fat loss     Muscle Mass Loss:  No significant muscle mass loss    Fluid Accumulation:  1 - Mild Genitals   Strength:  Normal  strength    Estimated Daily Nutrient Needs:  Energy (kcal):  5430-1594(87-54 gm/kg ABW); Weight Used for Energy Requirements:  Current     Protein (g):  112(2 gm/kg IBW); Weight Used for Protein Requirements:  Ideal        Fluid (ml/day):  9903-9515; Method Used for Fluid Requirements:  1 ml/kcal      Nutrition Related Findings:  Pt presents with class 3 obesity, edema in scrotum, PMH Prostate Cancer, T2 D, GERD. Pt has abrasion but no other skin issues noted and no other edema noted. eating fair -good. Wounds:  None       Current Nutrition Therapies:    DIET CARB CONTROL;  Dysphagia Soft and Bite-Sized    Anthropometric Measures:  · Height: 5' 3\" (160 cm)  · Current Body Weight: 243 lb (110.2 kg)   · Admission Body Weight:      · Usual Body Weight:       · Ideal Body Weight: 124 lbs; % Ideal Body Weight 196 %   · BMI: 43.1  · Adjusted Body Weight:  ; No Adjustment   · Adjusted BMI:      · BMI Categories: Obese Class 3 (BMI 40.0 or greater)       Nutrition Diagnosis:   · Increased nutrient needs related to inadequate protein-energy intake as evidenced by BMI, poor intake prior to admission, intake 51-75%, swallow study results      Nutrition Interventions:   Food and/or Nutrient Delivery:  Continue Current Diet, Start Oral Nutrition Supplement  Nutrition Education/Counseling:  Education not appropriate(Pt appeared very drowsy and did not seem to understand all RD was saying)   Coordination of Nutrition Care:  Continue to monitor while inpatient, Speech Therapy, Interdisciplinary Rounds    Goals:  to meet est needs for age/condition       Nutrition Monitoring and Evaluation:   Behavioral-Environmental Outcomes:      Food/Nutrient Intake Outcomes:  Food and Nutrient Intake, Supplement Intake  Physical Signs/Symptoms Outcomes:  Biochemical Data, Fluid Status or Edema, Weight, Skin     Discharge Planning:    Continue current diet     Electronically signed by Sariah Ahumada MS, RD, LD on 2/10/21 at 12:56 PM EST

## 2021-02-10 NOTE — FLOWSHEET NOTE
02/09/21 2020   Assessment   Charting Type Shift assessment   Neurological   Neuro (WDL) X   Level of Consciousness Alert (0)   Orientation Level Oriented to person;Oriented to place;Oriented to time;Oriented to situation   Cognition Follows commands   Language Clear   R Hand  Strong   L Hand  Strong   [place] Coma Scale   Eye Opening 4   Best Verbal Response 5   Best Motor Response 6   [place] Coma Scale Score 15   HEENT   HEENT (WDL) WDL   Respiratory   Respiratory (WDL) X   Respiratory Pattern Regular   Respiratory Depth Normal   Respiratory Quality/Effort Unlabored   Chest Assessment Chest expansion symmetrical   L Breath Sounds Rhonchi;Diminished   R Breath Sounds Rhonchi;Diminished   Breath Sounds   Right Upper Lobe Rhonchi   Right Middle Lobe Diminished   Right Lower Lobe Diminished   Left Upper Lobe Rhonchi   Left Lower Lobe Diminished   Cough/Sputum   Sputum How Obtained None   Cough Non-productive   Sputum Amount None   Cardiac   Cardiac (WDL) WDL   Cardiac Regularity Regular   Cardiac Rhythm NSR   Cardiac Monitor   Telemetry Monitor On No   Gastrointestinal   Abdominal (WDL) X   RUQ Bowel Sounds Active   LUQ Bowel Sounds Active   RLQ Bowel Sounds Active   LLQ Bowel Sounds Active   Abdomen Inspection Rounded   Peripheral Vascular   Peripheral Vascular (WDL) WDL   Edema Other (Comment)  (scrotum)   Skin Color/Condition   Skin Color/Condition (WDL) WDL   Skin Integrity   Skin Integrity (WDL) X   Skin Integrity Redness   Location Scrotum    Skin Integrity Site 2   Skin Integrity Location 2 Abrasion; Redness   Location 2 Left Upper Back    Musculoskeletal   Musculoskeletal (WDL) X   RUE Full movement   LUE Full movement   RL Extremity Weakness; Unsteady   LL Extremity Weakness; Unsteady   Genitourinary   Genitourinary (WDL) X   Urine Assessment   Incontinence Yes   Psychosocial   Psychosocial (WDL) WDL

## 2021-02-11 LAB
GLUCOSE BLD-MCNC: 118 MG/DL (ref 74–106)
GLUCOSE BLD-MCNC: 167 MG/DL (ref 74–106)
GLUCOSE BLD-MCNC: 193 MG/DL (ref 74–106)
GLUCOSE BLD-MCNC: 235 MG/DL (ref 74–106)
PERFORMED ON: ABNORMAL

## 2021-02-11 PROCEDURE — 2580000003 HC RX 258: Performed by: PHYSICIAN ASSISTANT

## 2021-02-11 PROCEDURE — 1200000002 HC SEMI PRIVATE SWING BED

## 2021-02-11 PROCEDURE — 6370000000 HC RX 637 (ALT 250 FOR IP): Performed by: PHYSICIAN ASSISTANT

## 2021-02-11 PROCEDURE — 92526 ORAL FUNCTION THERAPY: CPT

## 2021-02-11 PROCEDURE — 6360000002 HC RX W HCPCS: Performed by: PHYSICIAN ASSISTANT

## 2021-02-11 PROCEDURE — 2700000000 HC OXYGEN THERAPY PER DAY

## 2021-02-11 PROCEDURE — 94660 CPAP INITIATION&MGMT: CPT

## 2021-02-11 RX ORDER — GLIPIZIDE 5 MG/1
2.5 TABLET ORAL
Status: DISCONTINUED | OUTPATIENT
Start: 2021-02-11 | End: 2021-02-14

## 2021-02-11 RX ADMIN — DIAZEPAM 2 MG: 2 TABLET ORAL at 21:42

## 2021-02-11 RX ADMIN — RISPERIDONE 0.25 MG: 0.5 TABLET ORAL at 08:59

## 2021-02-11 RX ADMIN — METFORMIN HYDROCHLORIDE 1000 MG: 500 TABLET ORAL at 09:00

## 2021-02-11 RX ADMIN — PROPRANOLOL HYDROCHLORIDE 20 MG: 20 TABLET ORAL at 21:42

## 2021-02-11 RX ADMIN — PANTOPRAZOLE SODIUM 40 MG: 40 TABLET, DELAYED RELEASE ORAL at 09:02

## 2021-02-11 RX ADMIN — SERTRALINE HYDROCHLORIDE 100 MG: 50 TABLET ORAL at 08:59

## 2021-02-11 RX ADMIN — GLIPIZIDE 2.5 MG: 5 TABLET ORAL at 09:00

## 2021-02-11 RX ADMIN — GLIPIZIDE 2.5 MG: 5 TABLET ORAL at 17:33

## 2021-02-11 RX ADMIN — GUAIFENESIN 1200 MG: 600 TABLET, EXTENDED RELEASE ORAL at 09:00

## 2021-02-11 RX ADMIN — GUAIFENESIN 1200 MG: 600 TABLET, EXTENDED RELEASE ORAL at 21:42

## 2021-02-11 RX ADMIN — SUCRALFATE 1 G: 1 TABLET ORAL at 14:48

## 2021-02-11 RX ADMIN — ASPIRIN 81 MG: 81 TABLET, CHEWABLE ORAL at 08:59

## 2021-02-11 RX ADMIN — SUCRALFATE 1 G: 1 TABLET ORAL at 21:42

## 2021-02-11 RX ADMIN — QUETIAPINE FUMARATE 100 MG: 100 TABLET ORAL at 21:42

## 2021-02-11 RX ADMIN — INSULIN LISPRO 2 UNITS: 100 INJECTION, SOLUTION INTRAVENOUS; SUBCUTANEOUS at 17:33

## 2021-02-11 RX ADMIN — INSULIN LISPRO 1 UNITS: 100 INJECTION, SOLUTION INTRAVENOUS; SUBCUTANEOUS at 21:44

## 2021-02-11 RX ADMIN — METFORMIN HYDROCHLORIDE 1000 MG: 500 TABLET ORAL at 17:32

## 2021-02-11 RX ADMIN — ENOXAPARIN SODIUM 40 MG: 40 INJECTION, SOLUTION INTRAVENOUS; SUBCUTANEOUS at 17:33

## 2021-02-11 RX ADMIN — CEFTRIAXONE 1000 MG: 1 INJECTION, POWDER, FOR SOLUTION INTRAMUSCULAR; INTRAVENOUS at 14:48

## 2021-02-11 RX ADMIN — ALOGLIPTIN 6.25 MG: 12.5 TABLET, FILM COATED ORAL at 08:59

## 2021-02-11 RX ADMIN — SUCRALFATE 1 G: 1 TABLET ORAL at 08:59

## 2021-02-11 RX ADMIN — INSULIN LISPRO 1 UNITS: 100 INJECTION, SOLUTION INTRAVENOUS; SUBCUTANEOUS at 11:45

## 2021-02-11 RX ADMIN — DIVALPROEX SODIUM 250 MG: 250 TABLET, FILM COATED, EXTENDED RELEASE ORAL at 21:42

## 2021-02-11 RX ADMIN — PROPRANOLOL HYDROCHLORIDE 20 MG: 20 TABLET ORAL at 08:59

## 2021-02-11 RX ADMIN — RISPERIDONE 0.25 MG: 0.5 TABLET ORAL at 21:42

## 2021-02-11 RX ADMIN — PREDNISONE 40 MG: 20 TABLET ORAL at 08:59

## 2021-02-11 ASSESSMENT — PAIN SCALES - GENERAL: PAINLEVEL_OUTOF10: 0

## 2021-02-11 NOTE — PROGRESS NOTES
[facility]  SPEECH/LANGUAGE PATHOLOGY   INPATIENT DAILY NOTE      [x] Daily           [] Discharge    Patient:Theo Beaver      FGY:[birth year]  STEVAN:[id number]  Rehab Dx/Hx: Encephalopathy, unspecified [G93.40]  Declining functional status [R53.81]   No Known Allergies  Precautions: Sit up for all meals and thereafter for 30 minutes, Eat with small bites (1/2 tsp; 1 tsp), Alternate solids with liquids, Check mouth for food residue after snacks and meals and Take your medication with apple sauce; Restrictions/Precautions: General Precautions, Fall Risk    Home Situation/IADL:   Social/Functional History  Lives With: Family  Type of Home: Trailer  Home Layout: One level  Home Access: Ramped entrance  Bathroom Shower/Tub: Tub/Shower unit  Bathroom Toilet: Standard  Home Equipment: Standard walker, Cane  Receives Help From: Family  ADL Assistance: Independent  Homemaking Assistance: Independent  Homemaking Responsibilities: Yes  Ambulation Assistance: Independent  Transfer Assistance: Independent  Active : No      Date of Admit: 2/9/2021  Room #: G116/G116-01     ST Number of Minutes/Billable Intervention  Cog/Memory Deficits     Aphasia/Language     Dysarthria/Speech     Apraxia/Speech     Dysphagia/Swallowing 30    Group     Other    TOTAL Minutes Billed  30    Variance          Date: 2/11/2021  Day of ARU Week:  3       SLP Individual Minutes  Time In: 1130  Time Out: 1200  Minutes: 30 Group in:      Group Out:      Group total-  [place] in:    [place] out-   [place] total-      Variance/Reason:  [] Refusal due to   [] Medical hold/reason  [] Illness   [] Off Unit for test/procedure  [] Extra time needed to complete task  [] Other (specify)    Activity completed: Patient seen during lunch while upright in bed with SLP assist to sit upright in bed vs eating while laying back. Consumed soft and bite sized/minced and moist textures with thin liquids via cup sips.  Patient required multiple cues and assist to decreased bite size, slow pace and clear oral stasis prior to taking another bite/sip. Patient demonstrated coughing/choking x1 after consuming an excessive bite size (soft solid) and immediately taking a drink of thin liquids; however, cleared effectively with coughing and cues to reswallow. Patient demonstrated no comprehension or agreement when educated re: aspiration precaution guidelines and/or safe swallow strategies and continued to demonstrate impulsivity and decreased safety awareness. With trials of minced and moist texture, no overt s/sx aspiration was indicated. Recommend diet downgrade to minced and moist at this time; dietary notified. SLP will continue to monitor diet tolerance and educate re: safe swallow strategies. Pain: None reported or indicated  Current Diet: DIET CARB CONTROL; Dysphagia Soft and Bite-Sized  Dietary Nutrition Supplements: Standard High Calorie Oral Supplement  Subjective: Patient upright in bed during lunch with SLP assist to sit upright in bed, vs eating while laying back. Patient pleasant and agreeable to ST tx. Goals and POC: Co-treats where appropriate with PT or OT to facilitate patient goals in functional tasks. LTG Goal 1: Patient will consume safest and least restrictive diet without overt s/sx dysphagia 100% of the time to improve overall quality of life. Timeframe for Long-term Goals: 2 wks     Short-term Goals  Timeframe for Short-term Goals: 1 wk  Goal 1: Patient will tolerate least restrictive diet without overt s/sx aspiration 90% of the time. Goal 2: Patient/caregiver will demonstrate use/understanding of compensatory strategies/aspiration precaution guidelines with 90% acc. Goal 3: Patient will complete 3/3 oral motor exercises with 90% acc with min cues. Short-term Goals  Timeframe for Short-term Goals: 1 wk  Goal 1: Patient will tolerate least restrictive diet without overt s/sx aspiration 90% of the time.   Goal 2: Patient/caregiver will demonstrate use/understanding of compensatory strategies/aspiration precaution guidelines with 90% acc. Goal 3: Patient will complete 3/3 oral motor exercises with 90% acc with min cues. Alarm placed: [x]bed []chair   []other:          Barriers to progress:   [] Fatigue        [x] Cognitive Deficits   [] Memory Deficits   [] Reduced Attn   [] Self Limiting Behaviors    [x] Reduced insight/awareness     [] Visual Deficits   [] Premorbid Conditions   [] Impulsivity     [] Other      Education/Interventions used this date: Safe swallow strategies, aspiration precaution guidelines and recommended diet downgrade. Also communicated with nursing staff and dietary. Recommend assist with feeding during meals to reduce risk of choking/aspiration by providing cueing to consume small bites/sips, clearing oral stasis prior to taking another bite/sip, using liquid wash/reswallow and sitting upright during PO intake. [x] Progress was updated and reviewed with patient and/or family this date.       Interventions used this date:  [] Speech/Language Treatment    [] Instruction in HEP    Group   [x] Dysphagia Treatment   [] Cognitive Skill [place]    Other:         Assessment / Impression                                                          Treatment/Activity Tolerance:   [x] Tolerated Treatment well:     [] Patient limited by fatigue/pain:       [] Patient limited by medical complications:    [] Adverse Reaction to Tx:   [] Significant change in status:      Electronically Signed by  Haydee Galicia MS, [id number] [facility]    2/11/2021  12:02 PM

## 2021-02-12 LAB
GLUCOSE BLD-MCNC: 153 MG/DL (ref 74–106)
GLUCOSE BLD-MCNC: 242 MG/DL (ref 74–106)
GLUCOSE BLD-MCNC: 244 MG/DL (ref 74–106)
GLUCOSE BLD-MCNC: 95 MG/DL (ref 74–106)
PERFORMED ON: ABNORMAL
PERFORMED ON: NORMAL

## 2021-02-12 PROCEDURE — 1200000002 HC SEMI PRIVATE SWING BED

## 2021-02-12 PROCEDURE — 97530 THERAPEUTIC ACTIVITIES: CPT

## 2021-02-12 PROCEDURE — 6360000002 HC RX W HCPCS: Performed by: PHYSICIAN ASSISTANT

## 2021-02-12 PROCEDURE — 2580000003 HC RX 258: Performed by: PHYSICIAN ASSISTANT

## 2021-02-12 PROCEDURE — 6370000000 HC RX 637 (ALT 250 FOR IP): Performed by: PHYSICIAN ASSISTANT

## 2021-02-12 PROCEDURE — 94660 CPAP INITIATION&MGMT: CPT

## 2021-02-12 PROCEDURE — 97110 THERAPEUTIC EXERCISES: CPT

## 2021-02-12 PROCEDURE — 97535 SELF CARE MNGMENT TRAINING: CPT

## 2021-02-12 PROCEDURE — 97116 GAIT TRAINING THERAPY: CPT

## 2021-02-12 RX ADMIN — SERTRALINE HYDROCHLORIDE 100 MG: 50 TABLET ORAL at 08:13

## 2021-02-12 RX ADMIN — ENOXAPARIN SODIUM 40 MG: 40 INJECTION, SOLUTION INTRAVENOUS; SUBCUTANEOUS at 17:18

## 2021-02-12 RX ADMIN — ASPIRIN 81 MG: 81 TABLET, CHEWABLE ORAL at 08:13

## 2021-02-12 RX ADMIN — RISPERIDONE 0.25 MG: 0.5 TABLET ORAL at 22:12

## 2021-02-12 RX ADMIN — CEFTRIAXONE 1000 MG: 1 INJECTION, POWDER, FOR SOLUTION INTRAMUSCULAR; INTRAVENOUS at 15:04

## 2021-02-12 RX ADMIN — GLIPIZIDE 2.5 MG: 5 TABLET ORAL at 08:14

## 2021-02-12 RX ADMIN — INSULIN LISPRO 1 UNITS: 100 INJECTION, SOLUTION INTRAVENOUS; SUBCUTANEOUS at 11:24

## 2021-02-12 RX ADMIN — GUAIFENESIN 1200 MG: 600 TABLET, EXTENDED RELEASE ORAL at 22:12

## 2021-02-12 RX ADMIN — SUCRALFATE 1 G: 1 TABLET ORAL at 15:04

## 2021-02-12 RX ADMIN — DIVALPROEX SODIUM 250 MG: 250 TABLET, FILM COATED, EXTENDED RELEASE ORAL at 22:12

## 2021-02-12 RX ADMIN — PREDNISONE 40 MG: 20 TABLET ORAL at 08:14

## 2021-02-12 RX ADMIN — SUCRALFATE 1 G: 1 TABLET ORAL at 08:13

## 2021-02-12 RX ADMIN — GLIPIZIDE 2.5 MG: 5 TABLET ORAL at 15:05

## 2021-02-12 RX ADMIN — PROPRANOLOL HYDROCHLORIDE 20 MG: 20 TABLET ORAL at 08:14

## 2021-02-12 RX ADMIN — INSULIN LISPRO 1 UNITS: 100 INJECTION, SOLUTION INTRAVENOUS; SUBCUTANEOUS at 22:16

## 2021-02-12 RX ADMIN — ALOGLIPTIN 6.25 MG: 12.5 TABLET, FILM COATED ORAL at 08:14

## 2021-02-12 RX ADMIN — PROPRANOLOL HYDROCHLORIDE 20 MG: 20 TABLET ORAL at 22:14

## 2021-02-12 RX ADMIN — GUAIFENESIN 1200 MG: 600 TABLET, EXTENDED RELEASE ORAL at 08:14

## 2021-02-12 RX ADMIN — RISPERIDONE 0.25 MG: 0.5 TABLET ORAL at 08:14

## 2021-02-12 RX ADMIN — PANTOPRAZOLE SODIUM 40 MG: 40 TABLET, DELAYED RELEASE ORAL at 05:34

## 2021-02-12 RX ADMIN — DICLOFENAC SODIUM 2 G: 10 GEL TOPICAL at 08:14

## 2021-02-12 RX ADMIN — QUETIAPINE FUMARATE 100 MG: 100 TABLET ORAL at 22:12

## 2021-02-12 RX ADMIN — SUCRALFATE 1 G: 1 TABLET ORAL at 22:14

## 2021-02-12 RX ADMIN — METFORMIN HYDROCHLORIDE 1000 MG: 500 TABLET ORAL at 17:18

## 2021-02-12 RX ADMIN — DIAZEPAM 2 MG: 2 TABLET ORAL at 22:12

## 2021-02-12 RX ADMIN — INSULIN LISPRO 2 UNITS: 100 INJECTION, SOLUTION INTRAVENOUS; SUBCUTANEOUS at 17:18

## 2021-02-12 RX ADMIN — METFORMIN HYDROCHLORIDE 1000 MG: 500 TABLET ORAL at 08:13

## 2021-02-12 NOTE — PROGRESS NOTES
Occupational Therapy  Facility/Department: [facility] MED SURG  Daily Treatment Note  NAME: Po Cota  : 1960  MRN: [id number]    Date of Service: 2021    Discharge Recommendations:  Continue to assess pending progress       Assessment   Assessment: Pt agreeable to OT services. Pt sitting upright in recliner. Pt ambulated to bathroom with SBA using RW. Pt stood at sink to complete oral hygiene and shaving. Pt able to complete with NERI in standing. Pt fatigued and required seated rest break to complete task. Pt returned to chair and reported he had UI. Pt assisted with changing brief and cleaning meghan area. Pt able to don brief seated and CGA to initiate task. Pt able to pull up pants in standing. Pt left in chair with call light in reach. Patient Diagnosis(es): There were no encounter diagnoses. has a past medical history of Anxiety, B12 deficiency, [address] [address] Chronic constipation, DDD (degenerative disc disease), lumbar, Depression, Diabetes mellitus ([address] GERD (gastroesophageal reflux disease), Hyperlipidemia, Hypertension, and Lower back pain. has a past surgical history that includes Cholecystectomy and Prostatectomy (2018). Restrictions  Restrictions/Precautions  Restrictions/Precautions: General Precautions, Fall Risk  Required Braces or Orthoses?: No  Subjective   General  Chart Reviewed: Yes  Patient assessed for rehabilitation services?: Yes  Family / Caregiver Present: Yes  Referring Practitioner: Boone Ward PA-C  Diagnosis: Acute respiratory failure with hypercapnia  Subjective  Subjective: Pt transitioned to swing bed program. Agreeable to OT services. Pt asking about when he can return home.       Orientation     Objective    ADL  Grooming: Setup  LE Dressing: Contact guard assistance      Plan   Plan  Times per week: 3-5  Times per day: Daily  Plan weeks: 2  Current Treatment Recommendations: Strengthening, Balance Training, ROM, Functional Mobility Training, Endurance Training, Self-Care / ADL, Patient/Caregiver Education & Training    Goals  Short term goals  Time Frame for Short term goals: 1 week  Short term goal 1: Pt to complete toileting with MOD I. Short term goal 2: Pt to complete hygiene/grooming with MOD I. Short term goal 3: Pt to complete dressing with MOD I. Short term goal 4: Pt to complete bathing with MOD I. Short term goal 5: Pt to tolerate x15 minutes of activity to increase functional activity tolerance. Therapy Time   Individual Concurrent Group Co-treatment   Time In 0132         Time Out 0155         Minutes 23              This note serves as a DC summary in the event of pt discharge.      Elisa Nicholson, OTR/L

## 2021-02-12 NOTE — PROGRESS NOTES
Pt ambulating in hallway on RA with PT.  RA sats 95-96%  No c/o dyspnea verbalized at this time. No s/s of resp distress noted. Nelsy Li, PA nitified. -- ASHER Carlisle, CRT--

## 2021-02-12 NOTE — PROGRESS NOTES
Physical Therapy  Facility/Department: [facility] MED SURG  Daily Treatment Note  NAME: Iva Schirmer  : 1960  MRN: [id number]    Date of Service: 2021    Discharge Recommendations:  Continue to assess pending progress, Home with assist PRN      Assessment   Body structures, Functions, Activity limitations: Decreased functional mobility ; Decreased endurance;Decreased high-level IADLs;Decreased posture  Assessment: Patient completed bed mobility Mod I.  Stood with supervision and ambulated 225 feet with RW and SBA. Completed B LE therex in sitting. SpO2 remained WNLs throughout session. Patient left up in recliner with call light in reach. Treatment Diagnosis: Functional decline  Prognosis: Good  REQUIRES PT FOLLOW UP: Yes  Activity Tolerance  Activity Tolerance: Patient Tolerated treatment well     Patient Diagnosis(es): There were no encounter diagnoses. has a past medical history of Anxiety, B12 deficiency, Cancer ([address] Chronic constipation, DDD (degenerative disc disease), lumbar, Depression, Diabetes mellitus ([address] GERD (gastroesophageal reflux disease), Hyperlipidemia, Hypertension, and Lower back pain. has a past surgical history that includes Cholecystectomy and Prostatectomy (2018). Restrictions  Restrictions/Precautions  Restrictions/Precautions: General Precautions, Fall Risk  Required Braces or Orthoses?: No  Subjective   General  Chart Reviewed: Yes  Response To Previous Treatment: Not applicable  Family / Caregiver Present: No  Referring Practitioner: Epifanio Goode MD  Subjective  Subjective: Patient states he would really like to go home. Offers no c/o.   Pain Screening  Patient Currently in Pain: Denies  Vital Signs  Patient Currently in Pain: Denies       Orientation  Orientation  Overall Orientation Status: Within Functional Limits  Cognition      Objective   Bed mobility  Rolling to Left: Modified independent  Rolling to Right: Modified independent  Supine to Sit: Modified independent  Sit to Supine: Modified independent  Scooting: Modified independent  Transfers  Sit to Stand: Supervision  Stand to sit: Supervision  Bed to Chair: Supervision(with RW)  Ambulation  Ambulation?: Yes  Ambulation 1  Surface: level tile  Device: Rolling Walker  Assistance: Stand by assistance  Gait Deviations: Decreased step length;Decreased step height  Distance: 225 feet     Balance  Posture: Good  Sitting - Static: Good  Sitting - Dynamic: Good  Standing - Static: Good  Standing - Dynamic: Good;-  Exercises  Hip Flexion: 2x15  Hip Abduction: 2x15  Knee Long Arc Quad: 2x15  Ankle Pumps: 2x15      Goals  Short term goals  Short term goal 3: Pt to transfer bed to chair with RW x SBA  Long term goals  Time Frame for Long term goals : 7-10 days  Long term goal 1: Pt to ambulate 100 feet with RW x CGA with good safety awareness. Long term goal 2: Pt to perform bed mobility x Mod I  Long term goal 3: Pt to transfer bed to chair with RW x SBA  Long term goal 4: Patient to demonstrate good safety awareness with functional mobility activities. Plan    Plan  Times per week: 4-5 days per week  Plan weeks: 7-10 days  Current Treatment Recommendations: Strengthening, Transfer Training, Endurance Training, Patient/Caregiver Education & Training, Equipment Evaluation, Education, & procurement, ROM, Balance Training, Home Exercise Program, Safety Education & Training, Functional Mobility Training, Neuromuscular Re-education, Gait Training  Safety Devices  Type of devices: Left in chair, Chair alarm in place, Call light within reach     Therapy Time   Individual Concurrent Group Co-treatment   Time In 4775         Time Out 1258         Minutes 39              This note serves as D/C summary if patient is discharged prior to next visit.   Rachel Medina, PTA

## 2021-02-13 LAB
ANION GAP SERPL CALCULATED.3IONS-SCNC: 9 MMOL/L (ref 3–16)
BUN BLDV-MCNC: 24 MG/DL (ref 6–20)
CALCIUM SERPL-MCNC: 10.3 MG/DL (ref 8.5–10.5)
CHLORIDE BLD-SCNC: 99 MMOL/L (ref 98–107)
CO2: 29 MMOL/L (ref 20–30)
CREAT SERPL-MCNC: 0.9 MG/DL (ref 0.4–1.2)
GFR AFRICAN AMERICAN: >59
GFR NON-AFRICAN AMERICAN: >59
GLUCOSE BLD-MCNC: 100 MG/DL (ref 74–106)
GLUCOSE BLD-MCNC: 110 MG/DL (ref 74–106)
GLUCOSE BLD-MCNC: 117 MG/DL (ref 74–106)
GLUCOSE BLD-MCNC: 118 MG/DL (ref 74–106)
GLUCOSE BLD-MCNC: 79 MG/DL (ref 74–106)
HCT VFR BLD CALC: 40 % (ref 40–54)
HEMOGLOBIN: 12.6 G/DL (ref 13–18)
MCH RBC QN AUTO: 29.4 PG (ref 27–32)
MCHC RBC AUTO-ENTMCNC: 31.5 G/DL (ref 31–35)
MCV RBC AUTO: 93.5 FL (ref 80–100)
PDW BLD-RTO: 12.2 % (ref 11–16)
PERFORMED ON: ABNORMAL
PERFORMED ON: NORMAL
PLATELET # BLD: 402 K/UL (ref 150–400)
PMV BLD AUTO: 8.3 FL (ref 6–10)
POTASSIUM SERPL-SCNC: 4.4 MMOL/L (ref 3.4–5.1)
QUANTI TB GOLD PLUS: NEGATIVE
QUANTI TB1 MINUS NIL: 0.01 IU/ML (ref 0–0.34)
QUANTI TB2 MINUS NIL: 0.01 IU/ML (ref 0–0.34)
QUANTIFERON MITOGEN: >10 IU/ML
QUANTIFERON NIL: 0.01 IU/ML
RBC # BLD: 4.28 M/UL (ref 4.5–6)
SODIUM BLD-SCNC: 137 MMOL/L (ref 136–145)
WBC # BLD: 7 K/UL (ref 4–11)

## 2021-02-13 PROCEDURE — 94761 N-INVAS EAR/PLS OXIMETRY MLT: CPT

## 2021-02-13 PROCEDURE — 80048 BASIC METABOLIC PNL TOTAL CA: CPT

## 2021-02-13 PROCEDURE — 36415 COLL VENOUS BLD VENIPUNCTURE: CPT

## 2021-02-13 PROCEDURE — 94660 CPAP INITIATION&MGMT: CPT

## 2021-02-13 PROCEDURE — 85027 COMPLETE CBC AUTOMATED: CPT

## 2021-02-13 PROCEDURE — 97110 THERAPEUTIC EXERCISES: CPT

## 2021-02-13 PROCEDURE — 97116 GAIT TRAINING THERAPY: CPT

## 2021-02-13 PROCEDURE — 1200000002 HC SEMI PRIVATE SWING BED

## 2021-02-13 PROCEDURE — 2580000003 HC RX 258: Performed by: PHYSICIAN ASSISTANT

## 2021-02-13 PROCEDURE — 6370000000 HC RX 637 (ALT 250 FOR IP): Performed by: PHYSICIAN ASSISTANT

## 2021-02-13 PROCEDURE — 6360000002 HC RX W HCPCS: Performed by: PHYSICIAN ASSISTANT

## 2021-02-13 RX ADMIN — SUCRALFATE 1 G: 1 TABLET ORAL at 10:37

## 2021-02-13 RX ADMIN — METFORMIN HYDROCHLORIDE 1000 MG: 500 TABLET ORAL at 10:35

## 2021-02-13 RX ADMIN — PROPRANOLOL HYDROCHLORIDE 20 MG: 20 TABLET ORAL at 10:38

## 2021-02-13 RX ADMIN — PANTOPRAZOLE SODIUM 40 MG: 40 TABLET, DELAYED RELEASE ORAL at 05:46

## 2021-02-13 RX ADMIN — METFORMIN HYDROCHLORIDE 1000 MG: 500 TABLET ORAL at 17:42

## 2021-02-13 RX ADMIN — GLIPIZIDE 2.5 MG: 5 TABLET ORAL at 10:37

## 2021-02-13 RX ADMIN — RISPERIDONE 0.25 MG: 0.5 TABLET ORAL at 10:37

## 2021-02-13 RX ADMIN — CEFTRIAXONE 1000 MG: 1 INJECTION, POWDER, FOR SOLUTION INTRAMUSCULAR; INTRAVENOUS at 15:22

## 2021-02-13 RX ADMIN — SUCRALFATE 1 G: 1 TABLET ORAL at 15:22

## 2021-02-13 RX ADMIN — ALOGLIPTIN 6.25 MG: 12.5 TABLET, FILM COATED ORAL at 10:35

## 2021-02-13 RX ADMIN — PROPRANOLOL HYDROCHLORIDE 20 MG: 20 TABLET ORAL at 20:52

## 2021-02-13 RX ADMIN — ASPIRIN 81 MG: 81 TABLET, CHEWABLE ORAL at 10:37

## 2021-02-13 RX ADMIN — RISPERIDONE 0.25 MG: 0.5 TABLET ORAL at 20:52

## 2021-02-13 RX ADMIN — QUETIAPINE FUMARATE 100 MG: 100 TABLET ORAL at 20:52

## 2021-02-13 RX ADMIN — GUAIFENESIN 1200 MG: 600 TABLET, EXTENDED RELEASE ORAL at 10:37

## 2021-02-13 RX ADMIN — DIVALPROEX SODIUM 250 MG: 250 TABLET, FILM COATED, EXTENDED RELEASE ORAL at 20:52

## 2021-02-13 RX ADMIN — ENOXAPARIN SODIUM 40 MG: 40 INJECTION, SOLUTION INTRAVENOUS; SUBCUTANEOUS at 17:45

## 2021-02-13 RX ADMIN — SERTRALINE HYDROCHLORIDE 100 MG: 50 TABLET ORAL at 10:34

## 2021-02-13 RX ADMIN — SUCRALFATE 1 G: 1 TABLET ORAL at 20:51

## 2021-02-13 RX ADMIN — GUAIFENESIN 1200 MG: 600 TABLET, EXTENDED RELEASE ORAL at 20:52

## 2021-02-13 NOTE — PROGRESS NOTES
Yes  Ambulation 1  Surface: level tile  Device: Rolling Walker  Assistance: Supervision  Gait Deviations: Decreased step length;Decreased step height  Distance: 300 feet     Balance  Posture: Good  Sitting - Static: Good  Sitting - Dynamic: Good  Standing - Static: Good  Standing - Dynamic: Good;-  Exercises  Hip Flexion: 2x15  Hip Abduction: 2x15  Knee Long Arc Quad: 2x15  Ankle Pumps: 2x15      Goals  Short term goals  Short term goal 3: Pt to transfer bed to chair with RW x SBA  Long term goals  Time Frame for Long term goals : 7-10 days  Long term goal 1: Pt to ambulate 100 feet with RW x CGA with good safety awareness. Long term goal 2: Pt to perform bed mobility x Mod I  Long term goal 3: Pt to transfer bed to chair with RW x SBA  Long term goal 4: Patient to demonstrate good safety awareness with functional mobility activities. Plan    Plan  Times per week: 4-5 days per week  Plan weeks: 7-10 days  Current Treatment Recommendations: Strengthening, Transfer Training, Endurance Training, Patient/Caregiver Education & Training, Equipment Evaluation, Education, & procurement, ROM, Balance Training, Home Exercise Program, Safety Education & Training, Functional Mobility Training, Neuromuscular Re-education, Gait Training  Safety Devices  Type of devices: Left in chair, Chair alarm in place, Call light within reach     Therapy Time   Individual Concurrent Group Co-treatment   Time In 5429         Time Out 1121         Minutes 23              This note serves as D/C summary if patient is discharged prior to next visit.   Arun Olivera, PTA

## 2021-02-13 NOTE — FLOWSHEET NOTE
Pt resting quietly in bed. Am assessment complete, respirations equal and unlabored. Pt took am medications without difficulty. Pt instructed to call out with any needs or concerns, none at this time. Call bell within pt reach.      02/13/21 1040   Assessment   Charting Type Shift assessment   Neurological   Neuro (WDL) X   Level of Consciousness Alert (0)   Orientation Level Oriented to person;Oriented to place;Oriented to time;Oriented to situation   Cognition Follows commands   Language Clear   R Hand  Strong   L Hand  Strong   [place] Coma Scale   Eye Opening 4   Best Verbal Response 5   Best Motor Response 6   [place] Coma Scale Score 15   HEENT   HEENT (WDL) WDL   Respiratory   Respiratory (WDL) X   Respiratory Pattern Regular   Respiratory Depth Normal   Respiratory Quality/Effort Unlabored   Chest Assessment Chest expansion symmetrical   L Breath Sounds Clear;Diminished   R Breath Sounds Clear;Diminished   Breath Sounds   Right Upper Lobe Clear   Right Middle Lobe Diminished   Right Lower Lobe Diminished   Left Upper Lobe Clear   Left Lower Lobe Diminished   Cardiac   Cardiac (WDL) WDL   Gastrointestinal   Abdominal (WDL) X   RUQ Bowel Sounds Active   LUQ Bowel Sounds Active   RLQ Bowel Sounds Active   LLQ Bowel Sounds Active   Abdomen Inspection Rounded   Peripheral Vascular   Peripheral Vascular (WDL) X   Edema Other (Comment)  (scrotum)   Skin Color/Condition   Skin Color/Condition (WDL) WDL   Skin Integrity   Skin Integrity (WDL) X   Skin Integrity Redness   Location scrotum   Skin Integrity Site 2   Skin Integrity Location 2 Abrasion; Redness   Location 2 left upper back   Musculoskeletal   Musculoskeletal (WDL) X   RUE Full movement   LUE Full movement   RL Extremity Weakness; Unsteady   LL Extremity Weakness; Unsteady   Genitourinary   Genitourinary (WDL) X  (incontinence)   Urine Assessment   Incontinence Yes   Genitalia   Male Genitalia Uncircumcised; Enlarged scrotum  (redness)   Psychosocial Psychosocial (WDL) WDL

## 2021-02-14 LAB
GLUCOSE BLD-MCNC: 119 MG/DL (ref 74–106)
GLUCOSE BLD-MCNC: 138 MG/DL (ref 74–106)
GLUCOSE BLD-MCNC: 155 MG/DL (ref 74–106)
GLUCOSE BLD-MCNC: 89 MG/DL (ref 74–106)
PERFORMED ON: ABNORMAL
PERFORMED ON: NORMAL

## 2021-02-14 PROCEDURE — 97110 THERAPEUTIC EXERCISES: CPT

## 2021-02-14 PROCEDURE — 97530 THERAPEUTIC ACTIVITIES: CPT

## 2021-02-14 PROCEDURE — 1200000002 HC SEMI PRIVATE SWING BED

## 2021-02-14 PROCEDURE — 6360000002 HC RX W HCPCS: Performed by: PHYSICIAN ASSISTANT

## 2021-02-14 PROCEDURE — 94660 CPAP INITIATION&MGMT: CPT

## 2021-02-14 PROCEDURE — 97116 GAIT TRAINING THERAPY: CPT

## 2021-02-14 PROCEDURE — 6370000000 HC RX 637 (ALT 250 FOR IP): Performed by: PHYSICIAN ASSISTANT

## 2021-02-14 RX ADMIN — QUETIAPINE FUMARATE 100 MG: 100 TABLET ORAL at 21:07

## 2021-02-14 RX ADMIN — DICLOFENAC SODIUM 2 G: 10 GEL TOPICAL at 21:07

## 2021-02-14 RX ADMIN — SUCRALFATE 1 G: 1 TABLET ORAL at 09:00

## 2021-02-14 RX ADMIN — SERTRALINE HYDROCHLORIDE 100 MG: 50 TABLET ORAL at 09:01

## 2021-02-14 RX ADMIN — DIVALPROEX SODIUM 250 MG: 250 TABLET, FILM COATED, EXTENDED RELEASE ORAL at 21:07

## 2021-02-14 RX ADMIN — METFORMIN HYDROCHLORIDE 1000 MG: 500 TABLET ORAL at 09:00

## 2021-02-14 RX ADMIN — RISPERIDONE 0.25 MG: 0.5 TABLET ORAL at 09:00

## 2021-02-14 RX ADMIN — SUCRALFATE 1 G: 1 TABLET ORAL at 14:43

## 2021-02-14 RX ADMIN — GUAIFENESIN 1200 MG: 600 TABLET, EXTENDED RELEASE ORAL at 09:01

## 2021-02-14 RX ADMIN — SUCRALFATE 1 G: 1 TABLET ORAL at 21:07

## 2021-02-14 RX ADMIN — PROPRANOLOL HYDROCHLORIDE 20 MG: 20 TABLET ORAL at 21:07

## 2021-02-14 RX ADMIN — RISPERIDONE 0.25 MG: 0.5 TABLET ORAL at 21:07

## 2021-02-14 RX ADMIN — DIAZEPAM 2 MG: 2 TABLET ORAL at 21:07

## 2021-02-14 RX ADMIN — GUAIFENESIN 1200 MG: 600 TABLET, EXTENDED RELEASE ORAL at 21:07

## 2021-02-14 RX ADMIN — METFORMIN HYDROCHLORIDE 1000 MG: 500 TABLET ORAL at 16:43

## 2021-02-14 RX ADMIN — PROPRANOLOL HYDROCHLORIDE 20 MG: 20 TABLET ORAL at 09:01

## 2021-02-14 RX ADMIN — ENOXAPARIN SODIUM 40 MG: 40 INJECTION, SOLUTION INTRAVENOUS; SUBCUTANEOUS at 16:43

## 2021-02-14 RX ADMIN — INSULIN LISPRO 1 UNITS: 100 INJECTION, SOLUTION INTRAVENOUS; SUBCUTANEOUS at 16:45

## 2021-02-14 RX ADMIN — ALOGLIPTIN 6.25 MG: 12.5 TABLET, FILM COATED ORAL at 09:01

## 2021-02-14 RX ADMIN — ASPIRIN 81 MG: 81 TABLET, CHEWABLE ORAL at 09:01

## 2021-02-14 RX ADMIN — PANTOPRAZOLE SODIUM 40 MG: 40 TABLET, DELAYED RELEASE ORAL at 06:33

## 2021-02-14 ASSESSMENT — PAIN SCALES - GENERAL
PAINLEVEL_OUTOF10: 0

## 2021-02-14 NOTE — PLAN OF CARE
Problem: Daily Care:  Goal: Daily care needs are met  Description: Daily care needs are met  Outcome: Ongoing     Problem: Safety:  Goal: Free from accidental physical injury  Description: Free from accidental physical injury  Outcome: Ongoing

## 2021-02-14 NOTE — PROGRESS NOTES
Physical Therapy  Facility/Department: [facility] MED SURG  Daily Treatment Note  NAME: Donte Ríos  : 1960  MRN: [id number]    Date of Service: 2021    Discharge Recommendations:  Continue to assess pending progress, Home with assist PRN      Assessment   Body structures, Functions, Activity limitations: Decreased functional mobility ; Decreased endurance;Decreased high-level IADLs;Decreased posture  Assessment: Patient up in recliner. Stood with Mod I and ambulated 350 feet with RW and supervision. Completed B LE therex in sitting and standing. Patient requested to go to bed but was encouraged to sit up to eat lunch. He was in agreement. Treatment Diagnosis: Functional decline  Prognosis: Good  REQUIRES PT FOLLOW UP: Yes  Activity Tolerance  Activity Tolerance: Patient Tolerated treatment well     Patient Diagnosis(es): There were no encounter diagnoses. has a past medical history of Anxiety, B12 deficiency, Cancer ([address] Chronic constipation, DDD (degenerative disc disease), lumbar, Depression, Diabetes mellitus ([address] GERD (gastroesophageal reflux disease), Hyperlipidemia, Hypertension, and Lower back pain. has a past surgical history that includes Cholecystectomy and Prostatectomy (2018). Restrictions  Restrictions/Precautions  Restrictions/Precautions: General Precautions, Fall Risk  Required Braces or Orthoses?: No  Subjective   General  Chart Reviewed: Yes  Response To Previous Treatment: Patient with no complaints from previous session. Family / Caregiver Present: No  Referring Practitioner: Derian Locke MD  Subjective  Subjective: Patient offers no new c/o. States he would like to get home before the next snow comes.   Pain Screening  Patient Currently in Pain: Denies  Vital Signs  Patient Currently in Pain: Denies       Orientation  Orientation  Overall Orientation Status: Within Functional Limits  Cognition      Objective      Transfers  Sit to Stand: Modified independent  Stand to sit: Modified independent  Ambulation  Ambulation?: Yes  Ambulation 1  Surface: level tile  Device: Rolling Walker  Assistance: Supervision  Gait Deviations: Decreased step length;Decreased step height  Distance: 350 feet     Balance  Posture: Good  Sitting - Static: Good  Sitting - Dynamic: Good  Standing - Static: Good  Standing - Dynamic: Good;-  Exercises  Hip Flexion: 2x15  Hip Abduction: 2x15  Knee Long Arc Quad: 2x15  Ankle Pumps: 2x15  Comments: Standing:  heel raises, hip abd, and marching      Goals  Short term goals  Short term goal 3: Pt to transfer bed to chair with RW x SBA  Long term goals  Time Frame for Long term goals : 7-10 days  Long term goal 1: Pt to ambulate 100 feet with RW x CGA with good safety awareness. Long term goal 2: Pt to perform bed mobility x Mod I  Long term goal 3: Pt to transfer bed to chair with RW x SBA  Long term goal 4: Patient to demonstrate good safety awareness with functional mobility activities. Plan    Plan  Times per week: 4-5 days per week  Plan weeks: 7-10 days  Current Treatment Recommendations: Strengthening, Transfer Training, Endurance Training, Patient/Caregiver Education & Training, Equipment Evaluation, Education, & procurement, ROM, Balance Training, Home Exercise Program, Safety Education & Training, Functional Mobility Training, Neuromuscular Re-education, Gait Training  Safety Devices  Type of devices: Left in chair, Chair alarm in place, Call light within reach     Therapy Time   Individual Concurrent Group Co-treatment   Time In 1053         Time Out 1060         Minutes 38              This note serves as D/C summary if patient is discharged prior to next visit.   Rachel Medina, PTA

## 2021-02-14 NOTE — FLOWSHEET NOTE
02/14/21 0843   Vital Signs   Temp 98.1 °F (36.7 °C)   Temp Source Oral   Pulse 74   Heart Rate Source Monitor   Resp 18   /68   BP Location Right upper arm   Oxygen Therapy   SpO2 94 %   O2 Device None (Room air)

## 2021-02-14 NOTE — FLOWSHEET NOTE
02/14/21 0930   Assessment   Charting Type Shift assessment   Neurological   Neuro (WDL) X   Level of Consciousness Alert (0)   Orientation Level Oriented to person;Oriented to place;Oriented to time;Oriented to situation   Cognition Follows commands   Language Clear   R Hand  Strong   L Hand  Strong   [place] Coma Scale   Eye Opening 4   Best Verbal Response 5   Best Motor Response 6   [place] Coma Scale Score 15   HEENT   HEENT (WDL) WDL   Respiratory   Respiratory (WDL) X   Respiratory Pattern Regular   Respiratory Depth Normal   Respiratory Quality/Effort Unlabored   Chest Assessment Chest expansion symmetrical   L Breath Sounds Clear;Diminished   R Breath Sounds Clear;Diminished   Breath Sounds   Right Upper Lobe Clear   Right Middle Lobe Diminished   Right Lower Lobe Diminished   Left Upper Lobe Clear   Left Lower Lobe Diminished   Cough and Deep Breathe   Cough and Deep Breathe Other (Comment)  (encouraged)   Cardiac   Cardiac (WDL) WDL   Gastrointestinal   Abdominal (WDL) X   RUQ Bowel Sounds Active   LUQ Bowel Sounds Active   RLQ Bowel Sounds Active   LLQ Bowel Sounds Active   Abdomen Inspection Rounded   Peripheral Vascular   Peripheral Vascular (WDL) X   Edema Other (Comment)  (scrotum)   Skin Color/Condition   Skin Color/Condition (WDL) WDL   Skin Integrity   Skin Integrity (WDL) X   Skin Integrity Redness   Location scrotum   Multiple Skin Integrity Sites Yes   Skin Integrity Site 2   Skin Integrity Location 2 Abrasion; Redness   Location 2 left upper back   Musculoskeletal   Musculoskeletal (WDL) X   RUE Full movement   LUE Full movement   RL Extremity Weakness; Unsteady   LL Extremity Weakness; Unsteady   Genitourinary   Genitourinary (WDL) X  (incontinence)   Urine Assessment   Incontinence Yes   Genitalia   Male Genitalia Uncircumcised; Enlarged scrotum  (redness)   Anus/Rectum   Anus/Rectum (WDL) WDL   Psychosocial   Psychosocial (WDL) WDL   Pt alert times 4 Pt able to take am medications

## 2021-02-15 LAB
GLUCOSE BLD-MCNC: 118 MG/DL (ref 74–106)
GLUCOSE BLD-MCNC: 127 MG/DL (ref 74–106)
GLUCOSE BLD-MCNC: 172 MG/DL (ref 74–106)
GLUCOSE BLD-MCNC: 177 MG/DL (ref 74–106)
PERFORMED ON: ABNORMAL

## 2021-02-15 PROCEDURE — 6360000002 HC RX W HCPCS: Performed by: PHYSICIAN ASSISTANT

## 2021-02-15 PROCEDURE — 92526 ORAL FUNCTION THERAPY: CPT

## 2021-02-15 PROCEDURE — 94660 CPAP INITIATION&MGMT: CPT

## 2021-02-15 PROCEDURE — 1200000002 HC SEMI PRIVATE SWING BED

## 2021-02-15 PROCEDURE — 94761 N-INVAS EAR/PLS OXIMETRY MLT: CPT

## 2021-02-15 PROCEDURE — 97530 THERAPEUTIC ACTIVITIES: CPT

## 2021-02-15 PROCEDURE — 6370000000 HC RX 637 (ALT 250 FOR IP): Performed by: PHYSICIAN ASSISTANT

## 2021-02-15 RX ADMIN — SUCRALFATE 1 G: 1 TABLET ORAL at 20:40

## 2021-02-15 RX ADMIN — RISPERIDONE 0.25 MG: 0.5 TABLET ORAL at 08:24

## 2021-02-15 RX ADMIN — SERTRALINE HYDROCHLORIDE 100 MG: 50 TABLET ORAL at 08:24

## 2021-02-15 RX ADMIN — PANTOPRAZOLE SODIUM 40 MG: 40 TABLET, DELAYED RELEASE ORAL at 06:09

## 2021-02-15 RX ADMIN — METFORMIN HYDROCHLORIDE 1000 MG: 500 TABLET ORAL at 17:05

## 2021-02-15 RX ADMIN — GUAIFENESIN 1200 MG: 600 TABLET, EXTENDED RELEASE ORAL at 08:23

## 2021-02-15 RX ADMIN — QUETIAPINE FUMARATE 100 MG: 100 TABLET ORAL at 20:40

## 2021-02-15 RX ADMIN — ASPIRIN 81 MG: 81 TABLET, CHEWABLE ORAL at 08:24

## 2021-02-15 RX ADMIN — INSULIN LISPRO 1 UNITS: 100 INJECTION, SOLUTION INTRAVENOUS; SUBCUTANEOUS at 17:07

## 2021-02-15 RX ADMIN — ENOXAPARIN SODIUM 40 MG: 40 INJECTION, SOLUTION INTRAVENOUS; SUBCUTANEOUS at 17:06

## 2021-02-15 RX ADMIN — ALOGLIPTIN 6.25 MG: 12.5 TABLET, FILM COATED ORAL at 08:24

## 2021-02-15 RX ADMIN — PROPRANOLOL HYDROCHLORIDE 20 MG: 20 TABLET ORAL at 08:24

## 2021-02-15 RX ADMIN — METFORMIN HYDROCHLORIDE 1000 MG: 500 TABLET ORAL at 08:24

## 2021-02-15 RX ADMIN — PROPRANOLOL HYDROCHLORIDE 20 MG: 20 TABLET ORAL at 20:40

## 2021-02-15 RX ADMIN — RISPERIDONE 0.25 MG: 0.5 TABLET ORAL at 20:39

## 2021-02-15 RX ADMIN — GUAIFENESIN 1200 MG: 600 TABLET, EXTENDED RELEASE ORAL at 20:39

## 2021-02-15 RX ADMIN — DIVALPROEX SODIUM 250 MG: 250 TABLET, FILM COATED, EXTENDED RELEASE ORAL at 20:39

## 2021-02-15 RX ADMIN — SUCRALFATE 1 G: 1 TABLET ORAL at 13:17

## 2021-02-15 RX ADMIN — ERGOCALCIFEROL 50000 UNITS: 1.25 CAPSULE ORAL at 08:23

## 2021-02-15 RX ADMIN — SUCRALFATE 1 G: 1 TABLET ORAL at 08:24

## 2021-02-15 ASSESSMENT — PAIN SCALES - GENERAL: PAINLEVEL_OUTOF10: 0

## 2021-02-15 NOTE — PLAN OF CARE
Problem: Infection:  Goal: Will remain free from infection  Description: Will remain free from infection  Outcome: Met This Shift     Problem: Safety:  Goal: Free from accidental physical injury  Description: Free from accidental physical injury  Outcome: Met This Shift  Goal: Free from intentional harm  Description: Free from intentional harm  Outcome: Met This Shift     Problem: Daily Care:  Goal: Daily care needs are met  Description: Daily care needs are met  Outcome: Met This Shift     Problem: Pain:  Goal: Patient's pain/discomfort is manageable  Description: Patient's pain/discomfort is manageable  Outcome: Met This Shift

## 2021-02-15 NOTE — PROGRESS NOTES
[facility]  SPEECH/LANGUAGE PATHOLOGY   INPATIENT DAILY NOTE      [x] Daily           [] Discharge    Patient:Theo Mendez      MICHELLE:[birth year]  OUD:[id number]  Rehab Dx/Hx: Encephalopathy, unspecified [G93.40]  Declining functional status [R53.81]   No Known Allergies  Precautions: Sit up for all meals and thereafter for 30 minutes, Eat with small bites (1/2 tsp; 1 tsp), Drink from a cup only with small sips, Swallow hard (effortful swallow) and Alternate solids with liquids; Restrictions/Precautions: General Precautions, Fall Risk      Home Situation/IADL:   Social/Functional History  Lives With: Family  Type of Home: Trailer  Home Layout: One level  Home Access: Ramped entrance  Bathroom Shower/Tub: Tub/Shower unit  Bathroom Toilet: Standard  Home Equipment: Standard walker, Cane  Receives Help From: Family  ADL Assistance: Independent  Homemaking Assistance: Independent  Homemaking Responsibilities: Yes  Ambulation Assistance: Independent  Transfer Assistance: Independent  Active : No      Date of Admit: 2/9/2021  Room #: G116/G116-01     ST Number of Minutes/Billable Intervention  Cog/Memory Deficits     Aphasia/Language     Dysarthria/Speech     Apraxia/Speech     Dysphagia/Swallowing 30   Group     Other    TOTAL Minutes Billed 30    Variance          Date: 2/15/2021  Day of ARU Week:  7       SLP Individual Minutes  Time In: 0239  Time Out: 1215  Minutes: 30 Group in:      Group Out:      Group total-  [place] in:    [place] out-   [place] total-      Variance/Reason:  [] Refusal due to   [] Medical hold/reason  [] Illness   [] Off Unit for test/procedure  [] Extra time needed to complete task  [] Other (specify)    Activity completed: Patient consumed soft and bite sized texture and thin liquids with SLP supervision. Patient demonstrated coughing x2 during trials d/t significantly large bite size and drinking whie mouth was full of food.  Patient required max cues to reduce bite/sip sizes, pace bites/sips, sit upright at 90 degrees, clear oral stasis before taking another bite. Patient was receptive to education this date and stated \"I'll do whatever it takes to get me to go home\". Per PCA report, patient requires assist with positioning bed upright and locking it to prevent patient from lying back during meals. Education provided re: general compensatory strategies/aspiration precaution guidelines (90 degress upright during and at least 15-20 mins following PO intake, small bites/sips, alternating liquids/solids, lingual sweep to clear stasis and proper oral care). Patient verbalized understanding and agreement. Pain: None reported or indicated  Current Diet: Dietary Nutrition Supplements: Standard High Calorie Oral Supplement  DIET DYSPHAGIA MINCED AND MOIST; Carb Control: 4 carb choices (60 gms)/meal; Dysphagia Minced and Moist  Subjective: Patient upright in bed, pleasant and agreeable to ST tx. Goals and POC: Co-treats where appropriate with PT or OT to facilitate patient goals in functional tasks. LTG Goal 1: Patient will consume safest and least restrictive diet without overt s/sx dysphagia 100% of the time to improve overall quality of life. Timeframe for Long-term Goals: 2 wks    Short-term Goals  Timeframe for Short-term Goals: 1 wk  Goal 1: Patient will tolerate least restrictive diet without overt s/sx aspiration 90% of the time. Goal 2: Patient/caregiver will demonstrate use/understanding of compensatory strategies/aspiration precaution guidelines with 90% acc. Goal 3: Patient will complete 3/3 oral motor exercises with 90% acc with min cues. Short-term Goals  Timeframe for Short-term Goals: 1 wk  Goal 1: Patient will tolerate least restrictive diet without overt s/sx aspiration 90% of the time. Goal 2: Patient/caregiver will demonstrate use/understanding of compensatory strategies/aspiration precaution guidelines with 90% acc.   Goal 3: Patient will

## 2021-02-15 NOTE — FLOWSHEET NOTE
02/15/21 0800   Assessment   Charting Type Shift assessment   Neurological   Neuro (WDL) X   Level of Consciousness Alert (0)   Orientation Level Oriented to person;Oriented to place;Oriented to time;Oriented to situation   Cognition Follows commands   Language Clear   R Hand  Strong   L Hand  Strong   [place] Coma Scale   Eye Opening 4   Best Verbal Response 5   Best Motor Response 6   [place] Coma Scale Score 15   HEENT   HEENT (WDL) WDL   Respiratory   Respiratory (WDL) X   Respiratory Pattern Regular   Respiratory Depth Normal   Respiratory Quality/Effort Unlabored   Chest Assessment Chest expansion symmetrical   L Breath Sounds Clear;Diminished   R Breath Sounds Clear;Diminished   Breath Sounds   Right Upper Lobe Clear   Right Middle Lobe Diminished   Right Lower Lobe Diminished   Left Upper Lobe Clear   Left Lower Lobe Diminished   Cough/Sputum   Cough Non-productive;Strong   Sputum Amount None   Cardiac   Cardiac (WDL) WDL   Cardiac Monitor   Telemetry Monitor On No   Gastrointestinal   Abdominal (WDL) X   RUQ Bowel Sounds Active   LUQ Bowel Sounds Active   RLQ Bowel Sounds Active   LLQ Bowel Sounds Active   Abdomen Inspection Rounded   Peripheral Vascular   Peripheral Vascular (WDL) X   Edema Other (Comment)  (scrotum)   Skin Color/Condition   Skin Color/Condition (WDL) WDL   Skin Integrity   Skin Integrity (WDL) X   Skin Integrity Redness   Location scrotum   Skin Integrity Site 2   Skin Integrity Location 2 Abrasion; Redness   Location 2 lt upper back   Musculoskeletal   Musculoskeletal (WDL) X   RUE Full movement   LUE Full movement   RL Extremity Weakness; Unsteady   LL Extremity Weakness; Unsteady   Genitourinary   Genitourinary (WDL) X  (incontinence)   Urine Assessment   Incontinence Yes   Genitalia   Male Genitalia Uncircumcised; Enlarged scrotum  (redness)   Anus/Rectum   Anus/Rectum (WDL) WDL   Psychosocial   Psychosocial (WDL) WDL

## 2021-02-15 NOTE — PROGRESS NOTES
Physical Therapy  Facility/Department: [facility] MED SURG  Daily Treatment Note  NAME: Miguel Angel Washington  : 1960  MRN: [id number]    Date of Service: 2/15/2021    Discharge Recommendations:  Continue to assess pending progress, Home with assist PRN        Assessment   Body structures, Functions, Activity limitations: Decreased functional mobility ; Decreased endurance;Decreased high-level IADLs;Decreased posture  Assessment: Patient is showing steady progress with functional mobility, safety, and endurance. Treatment Diagnosis: Functional decline  Prognosis: Good  Decision Making: Low Complexity  REQUIRES PT FOLLOW UP: Yes  Activity Tolerance  Activity Tolerance: Patient Tolerated treatment well     Patient Diagnosis(es): There were no encounter diagnoses. has a past medical history of Anxiety, B12 deficiency, Cancer ([address] Chronic constipation, DDD (degenerative disc disease), lumbar, Depression, Diabetes mellitus ([address] GERD (gastroesophageal reflux disease), Hyperlipidemia, Hypertension, and Lower back pain. has a past surgical history that includes Cholecystectomy and Prostatectomy (2018). Restrictions  Restrictions/Precautions  Restrictions/Precautions: General Precautions, Fall Risk  Required Braces or Orthoses?: No  Subjective   General  Chart Reviewed: Yes  Response To Previous Treatment: Patient with no complaints from previous session. Family / Caregiver Present: No  Referring Practitioner: Dilia Hooper MD  Subjective  Subjective: Patient offers no new c/o. States he would like to get home soon.   Pain Screening  Patient Currently in Pain: Denies  Vital Signs  Patient Currently in Pain: Denies       Orientation  Orientation  Overall Orientation Status: Within Functional Limits  Cognition      Objective         Ambulation  Ambulation?: Yes  Ambulation 1  Surface: level tile  Device: Rolling Walker  Assistance: Supervision  Gait Deviations: Decreased step length;Decreased step height  Distance: 350 feet     Balance  Posture: Good  Sitting - Static: Good  Sitting - Dynamic: Good  Standing - Static: Good  Standing - Dynamic: Good;-                                      Goals  Short term goals  Short term goal 3: Pt to transfer bed to chair with RW x SBA  Long term goals  Time Frame for Long term goals : 7-10 days  Long term goal 1: Pt to ambulate 100 feet with RW x CGA with good safety awareness. Long term goal 2: Pt to perform bed mobility x Mod I  Long term goal 3: Pt to transfer bed to chair with RW x SBA  Long term goal 4: Patient to demonstrate good safety awareness with functional mobility activities.     Plan    Plan  Times per week: 4-5 days per week  Times per day: Daily  Plan weeks: 7-10 days  Current Treatment Recommendations: Strengthening, Transfer Training, Endurance Training, Patient/Caregiver Education & Training, Equipment Evaluation, Education, & procurement, ROM, Balance Training, Home Exercise Program, Safety Education & Training, Functional Mobility Training, Neuromuscular Re-education, Gait Training  Safety Devices  Type of devices: Left in chair, Chair alarm in place, Call light within reach     Therapy Time   Individual Concurrent Group Co-treatment   Time In           Time Out           Minutes                   Minor Both, PT

## 2021-02-16 LAB
GLUCOSE BLD-MCNC: 135 MG/DL (ref 74–106)
GLUCOSE BLD-MCNC: 148 MG/DL (ref 74–106)
GLUCOSE BLD-MCNC: 174 MG/DL (ref 74–106)
GLUCOSE BLD-MCNC: 217 MG/DL (ref 74–106)
PERFORMED ON: ABNORMAL

## 2021-02-16 PROCEDURE — 1200000002 HC SEMI PRIVATE SWING BED

## 2021-02-16 PROCEDURE — 6370000000 HC RX 637 (ALT 250 FOR IP): Performed by: PHYSICIAN ASSISTANT

## 2021-02-16 PROCEDURE — 92526 ORAL FUNCTION THERAPY: CPT

## 2021-02-16 PROCEDURE — 6360000002 HC RX W HCPCS: Performed by: PHYSICIAN ASSISTANT

## 2021-02-16 PROCEDURE — 94660 CPAP INITIATION&MGMT: CPT

## 2021-02-16 RX ADMIN — DIVALPROEX SODIUM 250 MG: 250 TABLET, FILM COATED, EXTENDED RELEASE ORAL at 20:50

## 2021-02-16 RX ADMIN — QUETIAPINE FUMARATE 100 MG: 100 TABLET ORAL at 20:50

## 2021-02-16 RX ADMIN — INSULIN LISPRO 1 UNITS: 100 INJECTION, SOLUTION INTRAVENOUS; SUBCUTANEOUS at 11:16

## 2021-02-16 RX ADMIN — SERTRALINE HYDROCHLORIDE 100 MG: 50 TABLET ORAL at 08:29

## 2021-02-16 RX ADMIN — SUCRALFATE 1 G: 1 TABLET ORAL at 08:29

## 2021-02-16 RX ADMIN — METFORMIN HYDROCHLORIDE 1000 MG: 500 TABLET ORAL at 08:24

## 2021-02-16 RX ADMIN — ALOGLIPTIN 6.25 MG: 12.5 TABLET, FILM COATED ORAL at 08:28

## 2021-02-16 RX ADMIN — GUAIFENESIN 1200 MG: 600 TABLET, EXTENDED RELEASE ORAL at 20:50

## 2021-02-16 RX ADMIN — SUCRALFATE 1 G: 1 TABLET ORAL at 20:51

## 2021-02-16 RX ADMIN — RISPERIDONE 0.25 MG: 0.5 TABLET ORAL at 20:51

## 2021-02-16 RX ADMIN — DIAZEPAM 2 MG: 2 TABLET ORAL at 20:50

## 2021-02-16 RX ADMIN — PROPRANOLOL HYDROCHLORIDE 20 MG: 20 TABLET ORAL at 20:51

## 2021-02-16 RX ADMIN — METFORMIN HYDROCHLORIDE 1000 MG: 500 TABLET ORAL at 17:05

## 2021-02-16 RX ADMIN — PANTOPRAZOLE SODIUM 40 MG: 40 TABLET, DELAYED RELEASE ORAL at 06:13

## 2021-02-16 RX ADMIN — ENOXAPARIN SODIUM 40 MG: 40 INJECTION, SOLUTION INTRAVENOUS; SUBCUTANEOUS at 17:05

## 2021-02-16 RX ADMIN — GUAIFENESIN 1200 MG: 600 TABLET, EXTENDED RELEASE ORAL at 08:28

## 2021-02-16 RX ADMIN — RISPERIDONE 0.25 MG: 0.5 TABLET ORAL at 08:29

## 2021-02-16 RX ADMIN — PROPRANOLOL HYDROCHLORIDE 20 MG: 20 TABLET ORAL at 08:29

## 2021-02-16 RX ADMIN — INSULIN LISPRO 2 UNITS: 100 INJECTION, SOLUTION INTRAVENOUS; SUBCUTANEOUS at 17:05

## 2021-02-16 RX ADMIN — ASPIRIN 81 MG: 81 TABLET, CHEWABLE ORAL at 08:29

## 2021-02-16 RX ADMIN — SUCRALFATE 1 G: 1 TABLET ORAL at 14:20

## 2021-02-16 NOTE — FLOWSHEET NOTE
02/15/21 2039   Assessment   Charting Type Shift assessment   Neurological   Neuro (WDL) X   Level of Consciousness Alert (0)   Orientation Level Oriented to person;Oriented to place;Oriented to time;Oriented to situation   Cognition Follows commands   Language Clear   Pelon Coma Scale   Eye Opening 4   Best Verbal Response 5   Best Motor Response 6   [place] Coma Scale Score 15   HEENT   HEENT (WDL) WDL   Respiratory   Respiratory (WDL) X   Respiratory Pattern Regular   Respiratory Depth Normal   Respiratory Quality/Effort Unlabored   Chest Assessment Chest expansion symmetrical   L Breath Sounds Clear;Diminished   R Breath Sounds Clear;Diminished   Breath Sounds   Right Upper Lobe Clear   Right Middle Lobe Diminished   Right Lower Lobe Diminished   Left Upper Lobe Clear   Left Lower Lobe Diminished   Cardiac   Cardiac (WDL) WDL   Cardiac Regularity Regular   Cardiac Monitor   Telemetry Monitor On No   Gastrointestinal   Abdominal (WDL) X   RUQ Bowel Sounds Active   LUQ Bowel Sounds Active   RLQ Bowel Sounds Active   LLQ Bowel Sounds Active   Abdomen Inspection Rounded   Peripheral Vascular   Peripheral Vascular (WDL) X   Edema Other (Comment)  (scrotum)   Skin Color/Condition   Skin Color/Condition (WDL) WDL   Skin Integrity   Skin Integrity (WDL) X   Skin Integrity Redness   Location scrotum   Musculoskeletal   Musculoskeletal (WDL) X   RUE Full movement   LUE Full movement   RL Extremity Weakness; Unsteady   LL Extremity Weakness; Unsteady   Genitourinary   Genitourinary (WDL) X  (incontinence)   Urine Assessment   Incontinence Yes   Genitalia   Male Genitalia Uncircumcised; Enlarged scrotum  (redness)   Anus/Rectum   Anus/Rectum (WDL) WDL   Psychosocial   Psychosocial (WDL) WDL   Pt is awake in bed, alert and oriented. Pt appears in no acute distress, VSS, currently on room air. Pt took evening medications whole with water without difficulty. Pt PIV/dressing CDI, flushing well.  Pt denies any SOA or pain at this time. Pt denies any needs at this time. Pt encouraged to call out if any needs. Call light and bedside table within reach. Will continue to monitor pt.

## 2021-02-16 NOTE — PLAN OF CARE
Problem: Infection:  Goal: Will remain free from infection  Description: Will remain free from infection  Outcome: Ongoing     Problem: Daily Care:  Goal: Daily care needs are met  Description: Daily care needs are met  Outcome: Not Met This Shift     Problem: Skin Integrity:  Goal: Absence of new skin breakdown  Description: Absence of new skin breakdown  Outcome: Ongoing

## 2021-02-16 NOTE — PROGRESS NOTES
[facility]  SPEECH/LANGUAGE PATHOLOGY   INPATIENT DAILY NOTE      [x] Daily           [] Discharge    Patient:Theo Simon      [facility]:[birth year]  TXX:[id number]  Rehab Dx/Hx: Encephalopathy, unspecified [G93.40]  Declining functional status [R53.81]   No Known Allergies  Precautions: Sit up for all meals and thereafter for 30 minutes, Eat with small bites (1/2 tsp; 1 tsp), Alternate solids with liquids, Check mouth for food residue after snacks and meals and Take your medication with apple sauce; Restrictions/Precautions: General Precautions, Fall Risk    Home Situation/IADL:   Social/Functional History  Lives With: Family  Type of Home: Trailer  Home Layout: One level  Home Access: Ramped entrance  Bathroom Shower/Tub: Tub/Shower unit  Bathroom Toilet: Standard  Home Equipment: Standard walker, Cane  Receives Help From: Family  ADL Assistance: Independent  Homemaking Assistance: Independent  Homemaking Responsibilities: Yes  Ambulation Assistance: Independent  Transfer Assistance: Independent  Active : No      Date of Admit: 2/9/2021  Room #: G116/G116-01     ST Number of Minutes/Billable Intervention  Cog/Memory Deficits     Aphasia/Language     Dysarthria/Speech     Apraxia/Speech     Dysphagia/Swallowing 30   Group     Other    TOTAL Minutes Billed  30    Variance          Date: 2/16/2021  Day of ARU Week:  1       SLP Individual Minutes  Time In: 1035  Time Out: 1105  Minutes: 25 Group in:      Group Out:      Group total-  [place] in:    [place] out-   [place] total-      Variance/Reason:  [] Refusal due to   [] Medical hold/reason  [] Illness   [] Off Unit for test/procedure  [] Extra time needed to complete task  [] Other (specify)    Activity completed: Patient consumed trials of soft and bite sized texture/thin liquids via straw. Patient required assist/cues to sit upright at 90 degrees.  Demonstrated min delayed oral transit time of bolus, however, cleared effectively with use of compensatory strategies independently. Completed effortful swallow exercises with min cues 5/5 x. Education provided to alternate liquids/solids, consume small bites/sips, proper positioning and oral care to reduce aspiration/choking risk; patient verbalized understanding. Pain: None reported or indicated  Current Diet: Dietary Nutrition Supplements: Standard High Calorie Oral Supplement  DIET DYSPHAGIA MINCED AND MOIST; Carb Control: 4 carb choices (60 gms)/meal; Dysphagia Minced and Moist  Subjective: Patient upright in bed, pleasant and agreeable to ST tx. Goals and POC: Co-treats where appropriate with PT or OT to facilitate patient goals in functional tasks. LTG Goal 1: Patient will consume safest and least restrictive diet without overt s/sx dysphagia 100% of the time to improve overall quality of life. Timeframe for Long-term Goals: 2 wks         Short-term Goals  Timeframe for Short-term Goals: 1 wk  Goal 1: Patient will tolerate least restrictive diet without overt s/sx aspiration 90% of the time. Goal 2: Patient/caregiver will demonstrate use/understanding of compensatory strategies/aspiration precaution guidelines with 90% acc. Goal 3: Patient will complete 3/3 oral motor exercises with 90% acc with min cues. Short-term Goals  Timeframe for Short-term Goals: 1 wk  Goal 1: Patient will tolerate least restrictive diet without overt s/sx aspiration 90% of the time. Goal 2: Patient/caregiver will demonstrate use/understanding of compensatory strategies/aspiration precaution guidelines with 90% acc. Goal 3: Patient will complete 3/3 oral motor exercises with 90% acc with min cues.       Alarm placed: [x]bed []chair   []other:          Barriers to progress:   [] Fatigue        [x] Cognitive Deficits   [] Memory Deficits   [] Reduced Attn   [] Self Limiting Behaviors    [] Reduced insight/awareness     [] Visual Deficits   [] Premorbid Conditions   [] Impulsivity     [] Other Education/Interventions used this date: Aspiration precaution guidelines/safe swallow strategies. [x] Progress was updated and reviewed with patient and/or family this date.       Interventions used this date:  [] Speech/Language Treatment    [] Instruction in HEP    Group   [x] Dysphagia Treatment   [] Cognitive Skill [place]    Other:         Assessment / Impression                                                          Treatment/Activity Tolerance:   [x] Tolerated Treatment well:     [] Patient limited by fatigue/pain:       [] Patient limited by medical complications:    [] Adverse Reaction to Tx:   [] Significant change in status:      Electronically Signed by  Rolando Escamilla MS    2/16/2021  11:20 AM

## 2021-02-16 NOTE — PLAN OF CARE
Problem: Infection:  Goal: Will remain free from infection  Description: Will remain free from infection  2/16/2021 0006 by Aruna Villafana RN  Outcome: Ongoing     Problem: Safety:  Goal: Free from accidental physical injury  Description: Free from accidental physical injury  Outcome: Ongoing     Problem: Daily Care:  Goal: Daily care needs are met  Description: Daily care needs are met  2/16/2021 0925 by Amaury Hernnadez  Outcome: Ongoing  2/16/2021 0006 by Aruna Villafana RN  Outcome: Not Met This Shift     Problem: Skin Integrity:  Goal: Absence of new skin breakdown  Description: Absence of new skin breakdown  2/16/2021 0925 by Amaury Hernandez  Outcome: Ongoing  2/16/2021 0006 by Aruna Villafana RN  Outcome: Ongoing

## 2021-02-16 NOTE — FLOWSHEET NOTE
02/16/21 0835   Assessment   Charting Type Shift assessment   Neurological   Neuro (WDL) X   Level of Consciousness Alert (0)   Orientation Level Oriented to person;Oriented to place;Oriented to time;Oriented to situation   Cognition Follows commands   Language Clear   R Hand  Strong   L Hand  Strong   Pelon Coma Scale   Eye Opening 4   Best Verbal Response 5   Best Motor Response 6   [place] Coma Scale Score 15   HEENT   HEENT (WDL) WDL   Respiratory   Respiratory (WDL) X   Respiratory Pattern Regular   Respiratory Depth Normal   Respiratory Quality/Effort Unlabored   Chest Assessment Chest expansion symmetrical   L Breath Sounds Clear;Diminished   R Breath Sounds Clear;Diminished   Breath Sounds   Right Upper Lobe Clear   Right Middle Lobe Diminished   Right Lower Lobe Diminished   Left Upper Lobe Clear   Left Lower Lobe Diminished   Cough/Sputum   Sputum How Obtained None   Cough Non-productive;Strong   Sputum Amount None   Cardiac   Cardiac (WDL) WDL   Cardiac Regularity Regular   Cardiac Monitor   Telemetry Monitor On No   Gastrointestinal   Abdominal (WDL) X   RUQ Bowel Sounds Active   LUQ Bowel Sounds Active   RLQ Bowel Sounds Active   LLQ Bowel Sounds Active   Abdomen Inspection Rounded   Last BM (including prior to admit) 02/16/21   Peripheral Vascular   Peripheral Vascular (WDL) X   Edema Other (Comment)  (scrotum)   Skin Color/Condition   Skin Color/Condition (WDL) WDL   Skin Integrity   Skin Integrity (WDL) X   Skin Integrity Redness   Location scrotum   Multiple Skin Integrity Sites Yes   Skin Integrity Site 2   Skin Integrity Location 2 Abrasion;Rash   Location 2 lt upper back  (improving)   Musculoskeletal   Musculoskeletal (WDL) X   RUE Full movement   LUE Full movement   RL Extremity Weakness; Unsteady   LL Extremity Weakness; Unsteady   Genitourinary   Genitourinary (WDL) X  (incontinence)   Urine Assessment   Incontinence Yes   Urine Color Yellow/straw   Urine Appearance Clear Genitalia   Male ÅSBRO Uncircumcised; Enlarged scrotum  (redness)   Anus/Rectum   Anus/Rectum (WDL) WDL   Psychosocial   Psychosocial (WDL) WDL     Pt awake in bed. Pt alert and oriented to person, place and situation. Pt appears in no acute distress. Pt currently on RA. Pt lung sounds clear and diminished. Pt encouraged to cough and deep breathe. Pt encouraged to raise head of bed when eating and drinking to reduce risk of choking. Pt call bell and bedside table within reach. Will continue to monitor pt.

## 2021-02-17 VITALS
HEART RATE: 76 BPM | BODY MASS INDEX: 40.54 KG/M2 | WEIGHT: 228.8 LBS | DIASTOLIC BLOOD PRESSURE: 60 MMHG | OXYGEN SATURATION: 97 % | TEMPERATURE: 96.9 F | HEIGHT: 63 IN | SYSTOLIC BLOOD PRESSURE: 116 MMHG | RESPIRATION RATE: 18 BRPM

## 2021-02-17 LAB
GLUCOSE BLD-MCNC: 140 MG/DL (ref 74–106)
GLUCOSE BLD-MCNC: 219 MG/DL (ref 74–106)
PERFORMED ON: ABNORMAL
PERFORMED ON: ABNORMAL

## 2021-02-17 PROCEDURE — 99315 NF DSCHRG MGMT 30 MIN/LESS: CPT | Performed by: INTERNAL MEDICINE

## 2021-02-17 PROCEDURE — 97116 GAIT TRAINING THERAPY: CPT

## 2021-02-17 PROCEDURE — 97803 MED NUTRITION INDIV SUBSEQ: CPT

## 2021-02-17 PROCEDURE — 97530 THERAPEUTIC ACTIVITIES: CPT

## 2021-02-17 PROCEDURE — 97110 THERAPEUTIC EXERCISES: CPT

## 2021-02-17 PROCEDURE — 94660 CPAP INITIATION&MGMT: CPT

## 2021-02-17 PROCEDURE — 6370000000 HC RX 637 (ALT 250 FOR IP): Performed by: PHYSICIAN ASSISTANT

## 2021-02-17 PROCEDURE — 94761 N-INVAS EAR/PLS OXIMETRY MLT: CPT

## 2021-02-17 RX ORDER — DIAZEPAM 2 MG/1
2 TABLET ORAL EVERY 12 HOURS PRN
Qty: 20 TABLET | Refills: 0 | Status: SHIPPED | OUTPATIENT
Start: 2021-02-17 | End: 2021-02-27

## 2021-02-17 RX ORDER — PROPRANOLOL HYDROCHLORIDE 20 MG/1
20 TABLET ORAL 2 TIMES DAILY
Qty: 90 TABLET | Refills: 3 | Status: SHIPPED | OUTPATIENT
Start: 2021-02-17 | End: 2022-09-08

## 2021-02-17 RX ORDER — QUETIAPINE FUMARATE 100 MG/1
100 TABLET, FILM COATED ORAL NIGHTLY
Qty: 60 TABLET | Refills: 3 | Status: ON HOLD | OUTPATIENT
Start: 2021-02-17 | End: 2022-07-06 | Stop reason: HOSPADM

## 2021-02-17 RX ORDER — RISPERIDONE 0.25 MG/1
0.25 TABLET, FILM COATED ORAL 2 TIMES DAILY
Qty: 60 TABLET | Refills: 3 | Status: ON HOLD | OUTPATIENT
Start: 2021-02-17 | End: 2022-07-06 | Stop reason: HOSPADM

## 2021-02-17 RX ORDER — ERGOCALCIFEROL 1.25 MG/1
50000 CAPSULE ORAL WEEKLY
Qty: 5 CAPSULE | Refills: 0 | Status: SHIPPED | OUTPATIENT
Start: 2021-02-22

## 2021-02-17 RX ADMIN — SUCRALFATE 1 G: 1 TABLET ORAL at 08:35

## 2021-02-17 RX ADMIN — INSULIN LISPRO 1 UNITS: 100 INJECTION, SOLUTION INTRAVENOUS; SUBCUTANEOUS at 08:42

## 2021-02-17 RX ADMIN — METFORMIN HYDROCHLORIDE 1000 MG: 500 TABLET ORAL at 08:34

## 2021-02-17 RX ADMIN — INSULIN LISPRO 2 UNITS: 100 INJECTION, SOLUTION INTRAVENOUS; SUBCUTANEOUS at 10:53

## 2021-02-17 RX ADMIN — ALOGLIPTIN 6.25 MG: 12.5 TABLET, FILM COATED ORAL at 08:34

## 2021-02-17 RX ADMIN — GUAIFENESIN 1200 MG: 600 TABLET, EXTENDED RELEASE ORAL at 08:34

## 2021-02-17 RX ADMIN — ASPIRIN 81 MG: 81 TABLET, CHEWABLE ORAL at 08:34

## 2021-02-17 RX ADMIN — PANTOPRAZOLE SODIUM 40 MG: 40 TABLET, DELAYED RELEASE ORAL at 06:04

## 2021-02-17 RX ADMIN — PROPRANOLOL HYDROCHLORIDE 20 MG: 20 TABLET ORAL at 08:34

## 2021-02-17 RX ADMIN — RISPERIDONE 0.25 MG: 0.5 TABLET ORAL at 08:34

## 2021-02-17 RX ADMIN — SERTRALINE HYDROCHLORIDE 100 MG: 50 TABLET ORAL at 08:34

## 2021-02-17 NOTE — FLOWSHEET NOTE
02/16/21 2200   Assessment   Charting Type Shift assessment   Neurological   Neuro (WDL) X   Level of Consciousness Alert (0)   Orientation Level Oriented to person;Oriented to place;Oriented to time;Oriented to situation   Cognition Follows commands   Language Clear   R Hand  Strong   L Hand  Strong   Pelon Coma Scale   Eye Opening 4   Best Verbal Response 5   Best Motor Response 6   [place] Coma Scale Score 15   HEENT   HEENT (WDL) WDL   Respiratory   Respiratory (WDL) X   Respiratory Pattern Regular   Respiratory Depth Normal   Respiratory Quality/Effort Unlabored   Chest Assessment Chest expansion symmetrical   L Breath Sounds Clear;Diminished   R Breath Sounds Clear;Diminished   Breath Sounds   Right Upper Lobe Clear   Right Middle Lobe Diminished   Right Lower Lobe Diminished   Left Upper Lobe Clear   Left Lower Lobe Diminished   Cough/Sputum   Sputum How Obtained None   Cough Non-productive;Strong   Sputum Amount None   Cardiac   Cardiac (WDL) WDL   Cardiac Regularity Regular   Cardiac Monitor   Telemetry Monitor On No   Gastrointestinal   Abdominal (WDL) X   RUQ Bowel Sounds Active   LUQ Bowel Sounds Active   RLQ Bowel Sounds Active   LLQ Bowel Sounds Active   Abdomen Inspection Rounded   Peripheral Vascular   Peripheral Vascular (WDL) X   Edema Other (Comment)  (scrotum)   Skin Color/Condition   Skin Color/Condition (WDL) WDL   Skin Integrity   Skin Integrity (WDL) X   Skin Integrity Redness   Location Scrotum    Skin Integrity Site 2   Skin Integrity Location 2 Abrasion;Rash   Location 2 Left Back    Musculoskeletal   Musculoskeletal (WDL) X   RUE Full movement   LUE Full movement   RL Extremity Weakness; Unsteady   LL Extremity Weakness; Unsteady   Genitourinary   Genitourinary (WDL) X  (incontinence)   Urine Assessment   Incontinence Yes   Urine Color Yellow/straw   Urine Appearance Clear   Genitalia   Male Genitalia Uncircumcised; Enlarged scrotum  (redness)   Anus/Rectum   Anus/Rectum (WDL) WDL   Psychosocial   Psychosocial (WDL) WDL

## 2021-02-17 NOTE — PROGRESS NOTES
Spoke with niece, Samira Devine, she is planning on picking up the patient between 12-1 PM today. She can be reached at [phone].
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
## 2021-02-17 NOTE — PROGRESS NOTES
Nutrition Assessment     Type and Reason for Visit: (follow up)    Nutrition Recommendations/Plan: Patient has ONS ordered BID. Will continue to monitor weights and encourage intakes. Nutrition Assessment:  Patient is at moderate risk for ongoing nutritional compromise r/t 6.6% weight loss since 2/6/21. Has ONS ordered; will continue to monitor weight and encourage intakes. Patient declined diet education at this time. Malnutrition Assessment:  Malnutrition Status: No malnutrition    Estimated Daily Nutrient Needs:  Energy (kcal): 2186-3732(33-29 kcal/kg actual BW); Weight Used for Energy Requirements:  Current     Protein (g): 112(2 g/kg IBW); Weight Used for Protein Requirements:  Ideal(2 g/kg IBW)        Fluid (ml/day): 4613-2485; Weight Used for Fluid Requirements:  1 ml/kcal      Nutrition Related Findings: Patient reports he is drinking ONS and likes the food. Vit D ordered. Edema on the scrotum and abrasion/rash on back.       Current Nutrition Therapies:    Dietary Nutrition Supplements: Standard High Calorie Oral Supplement  DIET DYSPHAGIA MINCED AND MOIST; Carb Control: 4 carb choices (60 gms)/meal; Dysphagia Minced and Moist    Anthropometric Measures:  · Height: 5' 3\" (160 cm)  · Current Body Wt: 228 lb (103.4 kg)   · BMI: 40.4    Nutrition Diagnosis:   · Increased nutrient needs related to inadequate protein-energy intake as evidenced by poor intake prior to admission, swallow study results      Nutrition Interventions:   Food and/or Nutrient Delivery:  Continue Current Diet, Continue Oral Nutrition Supplement  Nutrition Education/Counseling:  Education declined(Patient declined education on CHO control diet at this time)   Coordination of Nutrition Care:  Speech Therapy, Continue to monitor while inpatient, Interdisciplinary Rounds    Goals:  Meet estimated needs for age/condition       Nutrition Monitoring and Evaluation:   Behavioral-Environmental Outcomes:  None Identified   Food/Nutrient

## 2021-02-17 NOTE — PROGRESS NOTES
Physical Therapy  Facility/Department: [facility] MED SURG  Daily Treatment Note  NAME: Tamanna Curiel  : 1960  MRN: [id number]    Date of Service: 2021    Discharge Recommendations:  Continue to assess pending progress, Home with assist PRN      Assessment   Body structures, Functions, Activity limitations: Decreased functional mobility ; Decreased endurance;Decreased high-level IADLs;Decreased posture  Assessment: Patient Mod I with bed mobility and sit to stand. Ambulated 400 feet with RW and supervision. Completed B LE therex in sitting and standing. Patient required set up for UB dressing and SBA for LB dressing. Provided patient with written and illustrated HEP and went over each exercise with him. Treatment Diagnosis: Functional decline  Prognosis: Good  PT Education: Home Exercise Program  REQUIRES PT FOLLOW UP: Yes  Activity Tolerance  Activity Tolerance: Patient Tolerated treatment well     Patient Diagnosis(es): There were no encounter diagnoses. has a past medical history of Anxiety, B12 deficiency, Cancer ([address] Chronic constipation, DDD (degenerative disc disease), lumbar, Depression, Diabetes mellitus ([address] GERD (gastroesophageal reflux disease), Hyperlipidemia, Hypertension, and Lower back pain. has a past surgical history that includes Cholecystectomy and Prostatectomy (2018). Restrictions  Restrictions/Precautions  Restrictions/Precautions: General Precautions, Fall Risk  Required Braces or Orthoses?: No  Subjective   General  Chart Reviewed: Yes  Response To Previous Treatment: Not applicable  Family / Caregiver Present: No  Referring Practitioner: Mag Wang MD  Subjective  Subjective: Patient states he's hoping to go home today if he can get a ride.   Pain Screening  Patient Currently in Pain: Denies  Vital Signs  Patient Currently in Pain: Denies       Orientation  Orientation  Overall Orientation Status: Within Functional Limits  Cognition      Objective   Bed mobility  Rolling to Left: Modified independent  Rolling to Right: Modified independent  Supine to Sit: Modified independent  Scooting: Modified independent  Transfers  Sit to Stand: Modified independent  Stand to sit: Modified independent  Bed to Chair: Modified independent(using RW)  Ambulation  Ambulation?: Yes  Ambulation 1  Surface: level tile  Device: Rolling Walker  Assistance: Supervision  Gait Deviations: Decreased step length;Decreased step height  Distance: 400 feet     Balance  Posture: Good  Sitting - Static: Good  Sitting - Dynamic: Good  Standing - Static: Good  Standing - Dynamic: Good;-  Exercises  Hip Flexion: 2x15  Hip Abduction: 2x15  Knee Long Arc Quad: 2x15  Ankle Pumps: 2x15  Comments: Standing:  heel raises, hip abd, and marching      Goals  Short term goals  Short term goal 3: Pt to transfer bed to chair with RW x SBA  Long term goals  Time Frame for Long term goals : 7-10 days  Long term goal 1: Pt to ambulate 100 feet with RW x CGA with good safety awareness. Long term goal 2: Pt to perform bed mobility x Mod I  Long term goal 3: Pt to transfer bed to chair with RW x SBA  Long term goal 4: Patient to demonstrate good safety awareness with functional mobility activities. Plan    Plan  Times per week: 4-5 days per week  Times per day: Daily  Plan weeks: 7-10 days  Current Treatment Recommendations: Strengthening, Transfer Training, Endurance Training, Patient/Caregiver Education & Training, Equipment Evaluation, Education, & procurement, ROM, Balance Training, Home Exercise Program, Safety Education & Training, Functional Mobility Training, Neuromuscular Re-education, Gait Training  Safety Devices  Type of devices: Call light within reach, Left in chair     Therapy Time   Individual Concurrent Group Co-treatment   Time In 2195         Time Out 1109         Minutes 57              This note serves as D/C summary if patient is discharged prior to next visit.   Smiley Alves, PTA

## 2021-02-17 NOTE — PROGRESS NOTES
Patient's niece, Nora Jones, arrived for pick. Patient discharged home with family. Informed that medications were sent to [facility] in [place] and walker can be picked up at [facility].

## 2021-02-17 NOTE — PLAN OF CARE
Problem: Infection:  Goal: Will remain free from infection  Description: Will remain free from infection  2/17/2021 0918 by Ariella Webb  Outcome: Ongoing  2/16/2021 2201 by Franco Cage LPN  Outcome: Ongoing

## 2021-02-17 NOTE — CARE COORDINATION
Pt is wanting to go home. Family is coming today to get him. APS called and spoke with pt. Marie Saleem from [place] [id number] will follow pt after DC. Pt states he wants to go home and feels safe there and that niece is \"nice\" to him and \"takes care\" of him. RW ordered from [facility]. Family to transport home today. HH ordered with PATIENT[facility]S [facility].

## 2021-02-17 NOTE — FLOWSHEET NOTE
02/17/21 0840   Assessment   Charting Type Shift assessment   Neurological   Neuro (WDL) X   Level of Consciousness Alert (0)   Orientation Level Oriented to person;Oriented to place;Oriented to time;Oriented to situation   Cognition Follows commands   Language Clear   R Hand  Strong   L Hand  Strong   [place] Coma Scale   Eye Opening 4   Best Verbal Response 5   Best Motor Response 6   [place] Coma Scale Score 15   HEENT   HEENT (WDL) WDL   Respiratory   Respiratory (WDL) X   Respiratory Pattern Regular   Respiratory Depth Normal   Respiratory Quality/Effort Unlabored   Chest Assessment Chest expansion symmetrical   L Breath Sounds Clear;Diminished   R Breath Sounds Clear;Diminished   Breath Sounds   Right Upper Lobe Clear   Right Middle Lobe Diminished   Right Lower Lobe Diminished   Left Upper Lobe Clear   Left Lower Lobe Diminished   Cough/Sputum   Sputum How Obtained None   Cough Non-productive;Strong   Sputum Amount None   Cardiac   Cardiac (WDL) WDL   Cardiac Regularity Regular   Cardiac Monitor   Telemetry Monitor On No   Telemetry Audible Yes   Telemetry Alarms Set Yes   Gastrointestinal   Abdominal (WDL) X   RUQ Bowel Sounds Active   LUQ Bowel Sounds Active   RLQ Bowel Sounds Active   LLQ Bowel Sounds Active   Abdomen Inspection Rounded   Peripheral Vascular   Peripheral Vascular (WDL) X   Edema Other (Comment)  (scrotum)   Skin Color/Condition   Skin Color/Condition (WDL) WDL   Skin Integrity   Skin Integrity (WDL) X   Skin Integrity Redness   Location scrotum   Multiple Skin Integrity Sites Yes   Skin Integrity Site 2   Skin Integrity Location 2 Abrasion   Location 2 left back   Skin Integrity Site 3   Skin Integrity Location 3 Abrasion    Location 3 scrotum   Preventative Dressing No   Assessed this shift? Yes   Musculoskeletal   Musculoskeletal (WDL) X   RUE Full movement   LUE Full movement   RL Extremity Weakness; Unsteady   LL Extremity Weakness; Unsteady   Genitourinary   Genitourinary (WDL) X  (incontinence)   Urine Assessment   Incontinence Yes   Genitalia   Male Genitalia Uncircumcised; Enlarged scrotum  (redness)   Anus/Rectum   Anus/Rectum (WDL) WDL   Psychosocial   Psychosocial (WDL) WDL     Pt awake in bed. Pt alert and oriented. Pt appears in no acute distress. Pt currently on RA. Pt lung sounds clear and diminished. Pt encouraged to cough and deep breathe. Plans are to discharge the patient today. He is calling family now to arrange a ride. Pt call bell and bedside table within reach. Will continue to monitor pt.

## 2021-02-19 NOTE — PROGRESS NOTES
Patient instructed to discontinue fenofibrate. This was discussed at length with his nephew Jus Dickerson who helps manage patient's medications. Jus Dickerson will remove fenofibrate from patient's home regimen.

## 2021-03-09 PROBLEM — N39.0 UTI (URINARY TRACT INFECTION): Status: RESOLVED | Noted: 2021-02-07 | Resolved: 2021-03-09

## 2022-03-17 ENCOUNTER — HOSPITAL ENCOUNTER (EMERGENCY)
Facility: HOSPITAL | Age: 62
Discharge: ANOTHER HOSPITAL | End: 2022-03-18
Attending: EMERGENCY MEDICINE
Payer: MEDICAID

## 2022-03-17 ENCOUNTER — APPOINTMENT (OUTPATIENT)
Dept: GENERAL RADIOLOGY | Facility: HOSPITAL | Age: 62
End: 2022-03-17
Payer: MEDICAID

## 2022-03-17 ENCOUNTER — APPOINTMENT (OUTPATIENT)
Dept: CT IMAGING | Facility: HOSPITAL | Age: 62
End: 2022-03-17
Payer: MEDICAID

## 2022-03-17 DIAGNOSIS — J18.9 PNEUMONIA OF RIGHT LOWER LOBE DUE TO INFECTIOUS ORGANISM: Primary | ICD-10-CM

## 2022-03-17 DIAGNOSIS — J90 PLEURAL EFFUSION ON RIGHT: ICD-10-CM

## 2022-03-17 LAB
A/G RATIO: 0.9 (ref 0.8–2)
ALBUMIN SERPL-MCNC: 3 G/DL (ref 3.4–4.8)
ALP BLD-CCNC: 63 U/L (ref 25–100)
ALT SERPL-CCNC: 15 U/L (ref 4–36)
ANION GAP SERPL CALCULATED.3IONS-SCNC: 14 MMOL/L (ref 3–16)
AST SERPL-CCNC: 15 U/L (ref 8–33)
BASOPHILS ABSOLUTE: 0 K/UL (ref 0–0.1)
BASOPHILS RELATIVE PERCENT: 0.2 %
BILIRUB SERPL-MCNC: <0.2 MG/DL (ref 0.3–1.2)
BUN BLDV-MCNC: 15 MG/DL (ref 6–20)
CALCIUM SERPL-MCNC: 8.7 MG/DL (ref 8.5–10.5)
CHLORIDE BLD-SCNC: 97 MMOL/L (ref 98–107)
CO2: 24 MMOL/L (ref 20–30)
CREAT SERPL-MCNC: 0.9 MG/DL (ref 0.4–1.2)
EOSINOPHILS ABSOLUTE: 0.2 K/UL (ref 0–0.4)
EOSINOPHILS RELATIVE PERCENT: 2.3 %
GFR AFRICAN AMERICAN: >59
GFR NON-AFRICAN AMERICAN: >59
GLOBULIN: 3.3 G/DL
GLUCOSE BLD-MCNC: 67 MG/DL (ref 74–106)
HCT VFR BLD CALC: 32.6 % (ref 40–54)
HEMOGLOBIN: 10.6 G/DL (ref 13–18)
IMMATURE GRANULOCYTES #: 0 K/UL
IMMATURE GRANULOCYTES %: 0.4 % (ref 0–5)
LACTIC ACID: 1.8 MMOL/L (ref 0.4–2)
LYMPHOCYTES ABSOLUTE: 1.5 K/UL (ref 1.5–4)
LYMPHOCYTES RELATIVE PERCENT: 14.8 %
MCH RBC QN AUTO: 29.5 PG (ref 27–32)
MCHC RBC AUTO-ENTMCNC: 32.5 G/DL (ref 31–35)
MCV RBC AUTO: 90.8 FL (ref 80–100)
MONOCYTES ABSOLUTE: 1.3 K/UL (ref 0.2–0.8)
MONOCYTES RELATIVE PERCENT: 12.3 %
NEUTROPHILS ABSOLUTE: 7.3 K/UL (ref 2–7.5)
NEUTROPHILS RELATIVE PERCENT: 70 %
PDW BLD-RTO: 13.2 % (ref 11–16)
PLATELET # BLD: 442 K/UL (ref 150–400)
PMV BLD AUTO: 8.4 FL (ref 6–10)
POTASSIUM SERPL-SCNC: 4.2 MMOL/L (ref 3.4–5.1)
PRO-BNP: 505 PG/ML (ref 0–1800)
RBC # BLD: 3.59 M/UL (ref 4.5–6)
REASON FOR REJECTION: NORMAL
REJECTED TEST: NORMAL
SARS-COV-2, NAAT: NOT DETECTED
SODIUM BLD-SCNC: 135 MMOL/L (ref 136–145)
TOTAL PROTEIN: 6.3 G/DL (ref 6.4–8.3)
TROPONIN: <0.3 NG/ML
WBC # BLD: 10.4 K/UL (ref 4–11)

## 2022-03-17 PROCEDURE — 94640 AIRWAY INHALATION TREATMENT: CPT

## 2022-03-17 PROCEDURE — 96366 THER/PROPH/DIAG IV INF ADDON: CPT

## 2022-03-17 PROCEDURE — 87040 BLOOD CULTURE FOR BACTERIA: CPT

## 2022-03-17 PROCEDURE — 6370000000 HC RX 637 (ALT 250 FOR IP): Performed by: EMERGENCY MEDICINE

## 2022-03-17 PROCEDURE — 71045 X-RAY EXAM CHEST 1 VIEW: CPT

## 2022-03-17 PROCEDURE — 80053 COMPREHEN METABOLIC PANEL: CPT

## 2022-03-17 PROCEDURE — 85025 COMPLETE CBC W/AUTO DIFF WBC: CPT

## 2022-03-17 PROCEDURE — 71250 CT THORAX DX C-: CPT

## 2022-03-17 PROCEDURE — 83880 ASSAY OF NATRIURETIC PEPTIDE: CPT

## 2022-03-17 PROCEDURE — 96365 THER/PROPH/DIAG IV INF INIT: CPT

## 2022-03-17 PROCEDURE — 87635 SARS-COV-2 COVID-19 AMP PRB: CPT

## 2022-03-17 PROCEDURE — 83605 ASSAY OF LACTIC ACID: CPT

## 2022-03-17 PROCEDURE — 84484 ASSAY OF TROPONIN QUANT: CPT

## 2022-03-17 PROCEDURE — 36415 COLL VENOUS BLD VENIPUNCTURE: CPT

## 2022-03-17 PROCEDURE — 99285 EMERGENCY DEPT VISIT HI MDM: CPT

## 2022-03-17 RX ORDER — IPRATROPIUM BROMIDE AND ALBUTEROL SULFATE 2.5; .5 MG/3ML; MG/3ML
1 SOLUTION RESPIRATORY (INHALATION) ONCE
Status: COMPLETED | OUTPATIENT
Start: 2022-03-17 | End: 2022-03-17

## 2022-03-17 RX ADMIN — IPRATROPIUM BROMIDE AND ALBUTEROL SULFATE 1 AMPULE: .5; 3 SOLUTION RESPIRATORY (INHALATION) at 17:29

## 2022-03-17 NOTE — ED NOTES
Received report from [facility], advised they are sending pt ofr COPD exac.has been getting OP treament with IV Rocephin and Zithro since 3/15.  No improvement,   93%  96/55       Sherri Zelaya, RN  03/17/22 9800

## 2022-03-17 NOTE — ED PROVIDER NOTES
[facility] ENCOUNTER      Pt Name: Loan Chavez  MRN: [id number]  YOB: 1960  Date of evaluation: 3/17/2022  Provider: Bibi Hughes DO    CHIEF COMPLAINT       Chief Complaint   Patient presents with    Shortness of Breath         HISTORY OF PRESENT ILLNESS  (Location/Symptom, Timing/Onset, Context/Setting, Quality, Duration, Modifying Factors, Severity.)   Loan Chavez is a 64 y.o. male who presents to the emergency department with a chief complaint of cough and shortness of breath. Patient has been seeing his primary care physician and they have given him multiple doses of Rocephin. Presents today because he is not improving. Nursing notes were reviewed. REVIEW OFSYSTEMS    (2-9 systems for level 4, 10 or more for level 5)   ROS:  General:  No fevers, no chills, no weakness  Cardiovascular:  No chest pain, no palpitations  Respiratory:  +shortness of breath, +cough, no wheezing  Gastrointestinal:  No pain, no nausea, no vomiting, no diarrhea  Musculoskeletal:  No muscle pain, no joint pain  Skin:  No rash, no easy bruising  Neurologic:  No speech problems, no headache, no extremity weakness  Psychiatric:  No anxiety  Genitourinary:  No dysuria, no hematuria    Except as noted above the remainder of the review of systems was reviewed and negative.        PAST MEDICAL HISTORY     Past Medical History:   Diagnosis Date    Anxiety     B12 deficiency     [address] [address]     Prostate    Chronic constipation     DDD (degenerative disc disease), lumbar     Depression     Diabetes mellitus (HCC)     GERD (gastroesophageal reflux disease)     Hyperlipidemia     Hypertension     Lower back pain          SURGICAL HISTORY       Past Surgical History:   Procedure Laterality Date    CHOLECYSTECTOMY      PROSTATECTOMY  2018         CURRENT MEDICATIONS       Previous Medications    ASPIRIN 81 MG TABLET    Take 81 mg by mouth daily ATORVASTATIN (LIPITOR) 80 MG TABLET    Take 80 mg by mouth daily    DICLOFENAC SODIUM (VOLTAREN) 1 % GEL    Apply 2 g topically 2 times daily    DIVALPROEX (DEPAKOTE ER) 250 MG EXTENDED RELEASE TABLET    Take 250 mg by mouth nightly    EMPAGLIFLOZIN (JARDIANCE) 10 MG TABLET    Take 10 mg by mouth daily    FENOFIBRATE 160 MG TABLET    Take 160 mg by mouth daily    GLIPIZIDE (GLUCOTROL) 10 MG TABLET    Take 10 mg by mouth 2 times daily (before meals)    METFORMIN (GLUCOPHAGE) 1000 MG TABLET    Take 1,000 mg by mouth 2 times daily (with meals)    PANTOPRAZOLE SODIUM (PROTONIX) 40 MG PACK PACKET    Take 40 mg by mouth every morning (before breakfast)    PROPRANOLOL (INDERAL) 20 MG TABLET    Take 1 tablet by mouth 2 times daily    QUETIAPINE (SEROQUEL) 100 MG TABLET    Take 1 tablet by mouth nightly    RISPERIDONE (RISPERDAL) 0.25 MG TABLET    Take 1 tablet by mouth 2 times daily    SERTRALINE (ZOLOFT) 100 MG TABLET    Take 100 mg by mouth daily    SITAGLIPTIN (JANUVIA) 100 MG TABLET    Take 100 mg by mouth daily    SUCRALFATE (CARAFATE) 1 GM TABLET    Take 1 g by mouth 3 times daily    VITAMIN D (ERGOCALCIFEROL) 1.25 MG (56174 UT) CAPS CAPSULE    Take 1 capsule by mouth once a week       ALLERGIES     Patient has no known allergies. FAMILY HISTORY     History reviewed. No pertinent family history.        SOCIAL HISTORY       Social History     Socioeconomic History    Marital status: Single     Spouse name: None    Number of children: None    Years of education: None    Highest education level: None   Occupational History    None   Tobacco Use    Smoking status: Former Smoker     Packs/day: 2.00     Years: 25.00     Pack years: 50.00     Types: Cigarettes     Quit date: 2022     Years since quittin.1    Smokeless tobacco: Current User     Types: Chew   Substance and Sexual Activity    Alcohol use: No    Drug use: No    Sexual activity: Not Currently     Partners: Female   Other Topics Concern    None   Social History Narrative    None     Social Determinants of Health     Financial Resource Strain:     Difficulty of Paying Living Expenses: Not on file   Food Insecurity:     Worried About Running Out of Food in the Last Year: Not on file    Armando of Food in the Last Year: Not on file   Transportation Needs:     Lack of Transportation (Medical): Not on file    Lack of Transportation (Non-Medical): Not on file   Physical Activity:     Days of Exercise per Week: Not on file    Minutes of Exercise per Session: Not on file   Stress:     Feeling of Stress : Not on file   Social Connections:     Frequency of Communication with Friends and Family: Not on file    Frequency of Social Gatherings with Friends and Family: Not on file    Attends Zoroastrian Services: Not on file    Active Member of [address] [place] [address] [employer] or Organizations: Not on file    Attends Club or Organization Meetings: Not on file    Marital Status: Not on file   Intimate Partner Violence:     Fear of Current or Ex-Partner: Not on file    Emotionally Abused: Not on file    Physically Abused: Not on file    Sexually Abused: Not on file   Housing Stability:     Unable to Pay for Housing in the Last Year: Not on file    Number of [place] in the Last Year: Not on file    Unstable Housing in the Last Year: Not on file         PHYSICAL EXAM    (up to 7 for level 4, 8 or more for level 5)     ED Triage Vitals   BP Temp Temp src Pulse Resp SpO2 Height Weight   03/17/22 1715 -- -- 03/17/22 1713 03/17/22 1713 03/17/22 1713 03/17/22 1714 03/17/22 1714   (!) 110/46   75 28 94 % 5' 3\" (1.6 m) 242 lb (109.8 kg)       Physical Exam  General :Patient is awake, alert, oriented, in no acute distress, nontoxic appearing  HEENT: Pupils are equally round and reactive to light, EOMI, conjunctivae clear. Oral mucosa is moist, no exudate.  Uvula is midline  Neck: Neck is supple, full range of motion, trachea midline  Cardiac: Heart regular rate, rhythm, no murmurs, rubs, or gallops  Lungs: Mild bibasilar wheeze. There is no use of accessory muscles. Abdomen: Abdomen is soft, nontender, nondistended. There is no firm or pulsatile masses, no rebound rigidity or guarding. Musculoskeletal: 5 out of 5 strength in all 4 extremities. No focal muscle deficits are appreciated  Neuro: Motor intact, sensory intact, level of consciousness is normal.  Dermatology: Skin is warm and dry  Psych: Mentation is grossly normal, cognition is grossly normal. Affect is appropriate. DIAGNOSTIC RESULTS     EKG: All EKG's are interpreted by the Emergency Department Physician who either signs or Co-signs this chart in the absenceof a cardiologist.    The EKG interpreted by me shows normal sinus rhythm rate of 75 normal ME interval normal QT interval normal axis normal ST segments    RADIOLOGY:   Non-plain film images such as CT, Ultrasound and MRI are read by the radiologist. Plain radiographic images are visualized and preliminarily interpreted by the emergency physician with the below findings:      ? Radiologist's Report Reviewed:  CT CHEST WO CONTRAST   Final Result      1. Moderate loculated right pleural effusion extending to the major fissure. Right lower lobe pneumonia and atelectasis. 2.  Mild bronchiolitis in the right upper lobe and left lower lobe. 3.  2 cm hypodense left thyroid nodule. XR CHEST PORTABLE   Final Result   1. Right basilar opacity suspicious for early pneumonia.             ED BEDSIDE ULTRASOUND:   Performed by ED Physician - none    LABS:    I have reviewed and interpreted all of the currently available lab results from this visit (ifapplicable):  Results for orders placed or performed during the hospital encounter of 03/17/22   COVID-19, Rapid    Specimen: Nasopharyngeal Swab   Result Value Ref Range    SARS-CoV-2, NAAT Not Detected Not Detected   Lactic Acid   Result Value Ref Range    Lactic Acid 1.8 0.4 - 2.0 mmol/L   CBC with Auto Differential   Result Value Ref Range    WBC 10.4 4.0 - 11.0 K/uL    RBC 3.59 (L) 4.50 - 6.00 M/uL    Hemoglobin 10.6 (L) 13.0 - 18.0 g/dL    Hematocrit 32.6 (L) 40.0 - 54.0 %    MCV 90.8 80.0 - 100.0 fL    MCH 29.5 27.0 - 32.0 pg    MCHC 32.5 31.0 - 35.0 g/dL    RDW 13.2 11.0 - 16.0 %    Platelets 191 (H) 769 - 400 K/uL    MPV 8.4 6.0 - 10.0 fL    Neutrophils % 70.0 %    Immature Granulocytes % 0.4 0.0 - 5.0 %    Lymphocytes % 14.8 %    Monocytes % 12.3 %    Eosinophils % 2.3 %    Basophils % 0.2 %    Neutrophils Absolute 7.3 2.0 - 7.5 K/uL    Immature Granulocytes # 0.0 K/uL    Lymphocytes Absolute 1.5 1.5 - 4.0 K/uL    Monocytes Absolute 1.3 (H) 0.2 - 0.8 K/uL    Eosinophils Absolute 0.2 0.0 - 0.4 K/uL    Basophils Absolute 0.0 0.0 - 0.1 K/uL   SPECIMEN REJECTION   Result Value Ref Range    Rejected Test cmp trop     Reason for Rejection see below    Comprehensive Metabolic Panel   Result Value Ref Range    Sodium 135 (L) 136 - 145 mmol/L    Potassium 4.2 3.4 - 5.1 mmol/L    Chloride 97 (L) 98 - 107 mmol/L    CO2 24 20 - 30 mmol/L    Anion Gap 14 3 - 16    Glucose 67 (L) 74 - 106 mg/dL    BUN 15 6 - 20 mg/dL    CREATININE 0.9 0.4 - 1.2 mg/dL    GFR Non-African American >59 >59    GFR African American >59 >59    Calcium 8.7 8.5 - 10.5 mg/dL    Total Protein 6.3 (L) 6.4 - 8.3 g/dL    Albumin 3.0 (L) 3.4 - 4.8 g/dL    Albumin/Globulin Ratio 0.9 0.8 - 2.0    Total Bilirubin <0.2 (L) 0.3 - 1.2 mg/dL    Alkaline Phosphatase 63 25 - 100 U/L    ALT 15 4 - 36 U/L    AST 15 8 - 33 U/L    Globulin 3.3 Not Established g/dL   Troponin   Result Value Ref Range    Troponin <0.30 <0.30 ng/mL   Brain Natriuretic Peptide   Result Value Ref Range    Pro- 0 - 1,800 pg/mL        All other labs were within normal range or not returned as of this dictation.     EMERGENCY DEPARTMENT COURSE and DIFFERENTIAL DIAGNOSIS/MDM:   Vitals:    Vitals:    03/18/22 0059 03/18/22 0104 03/18/22 0119 03/18/22 0740   BP:    123/73   Pulse: 87 86 88 92   Resp: 24 23 25 18   Temp:       TempSrc:       SpO2: 96% 96% 95% 96%   Weight:       Height:           MEDICATIONS ADMINISTERED IN ED:  Medications   ipratropium-albuterol (DUONEB) nebulizer solution 1 ampule (1 ampule Inhalation Given 3/17/22 9871)   piperacillin-tazobactam (ZOSYN) 3,375 mg in dextrose 5 % 50 mL IVPB (mini-bag) (0 mg IntraVENous Stopped 3/18/22 0648)   levoFLOXacin (LEVAQUIN) tablet 500 mg (500 mg Oral Given 3/18/22 5221)       Transferred to Dr. Marge Rios at shift change CT pending. Received back from Dr. Marge Rios at shift change. Chest x-ray showed right basilar opacity suspicious for pneumonia. CT scan of the chest showed moderate loculated pleural effusion with associated right lower lobe pneumonia and atelectasis, bronchiolitis of the right upper lobe and left lower lobe and 2 cm thyroid nodule. BNP was 505. Covid was negative. Lactic acid was normal at 1.8. CMP was unremarkable. CBC was unremarkable as well. Patient has been on Rocephin outpatient for 3 days without any improvement. Cough that at this point since he is not improving symptoms worsening hospitalization is the best course of treatment. Patient does not wish to be transferred he is waiting for placement at this hospital.  Patient has received oral Levaquin today and IV Zosyn. Renu roberts declined admission states needs specialty care not available here. Called [facility] at 9635 to initiate transfer process. Patient excepted to [address] by Dr. Ping Irvin. The patient will follow-up with their PCP in 1-2 days for reevaluation. If the patient or family members have anyfurther concerns or any worsening symptoms they will return to the ED for reevaluation. CONSULTS:  None    PROCEDURES:  Procedures    CRITICAL CARE TIME    Total Critical Care time was 0 minutes, excluding separately reportable procedures.    There was a high probability of clinically significant/life threatening deterioration in the patient's condition which required my urgent intervention. FINAL IMPRESSION      1. Pneumonia of right lower lobe due to infectious organism New Problem   2. Pleural effusion on right          DISPOSITION/PLAN   DISPOSITION        PATIENT REFERRED TO:  No follow-up provider specified. DISCHARGE MEDICATIONS:  New Prescriptions    No medications on file       Comment: Please note this report has been produced using speech recognition software and may contain errorsrelated to that system including errors in grammar, punctuation, and spelling, as well as words and phrases that may be inappropriate. If there are any questions or concerns please feel free to contact the dictating providerfor clarification.     Lindsay Paget,   Attending Emergency Physician              Lindsay Paget, DO  03/17/22 Horace Rosas [facility] 112, DO  03/18/22 5740

## 2022-03-18 VITALS
WEIGHT: 242 LBS | TEMPERATURE: 99.4 F | OXYGEN SATURATION: 97 % | RESPIRATION RATE: 20 BRPM | SYSTOLIC BLOOD PRESSURE: 122 MMHG | BODY MASS INDEX: 42.88 KG/M2 | HEIGHT: 63 IN | DIASTOLIC BLOOD PRESSURE: 68 MMHG | HEART RATE: 85 BPM

## 2022-03-18 PROCEDURE — 2580000003 HC RX 258: Performed by: FAMILY MEDICINE

## 2022-03-18 PROCEDURE — 96365 THER/PROPH/DIAG IV INF INIT: CPT

## 2022-03-18 PROCEDURE — 96366 THER/PROPH/DIAG IV INF ADDON: CPT

## 2022-03-18 PROCEDURE — 93005 ELECTROCARDIOGRAM TRACING: CPT

## 2022-03-18 PROCEDURE — 6370000000 HC RX 637 (ALT 250 FOR IP): Performed by: FAMILY MEDICINE

## 2022-03-18 PROCEDURE — 6360000002 HC RX W HCPCS: Performed by: FAMILY MEDICINE

## 2022-03-18 RX ORDER — LEVOFLOXACIN 500 MG/1
500 TABLET, FILM COATED ORAL ONCE
Status: COMPLETED | OUTPATIENT
Start: 2022-03-18 | End: 2022-03-18

## 2022-03-18 RX ADMIN — LEVOFLOXACIN 500 MG: 500 TABLET, FILM COATED ORAL at 03:51

## 2022-03-18 RX ADMIN — PIPERACILLIN AND TAZOBACTAM 3375 MG: 3; .375 INJECTION, POWDER, FOR SOLUTION INTRAVENOUS at 03:51

## 2022-03-18 ASSESSMENT — PAIN SCALES - GENERAL: PAINLEVEL_OUTOF10: 0

## 2022-03-18 NOTE — ED PROVIDER NOTES
7:18 AM EDT  Tao Collado was checked out to me by Dr. Anu Canas. Please see his/her initial documentation for details of the patient's ED presentation, physical exam and completed studies. In brief, Tao Collado is a 64 y.o. male that presents to ER with patient having 3 day history of cough, shortness of breath, looks like pneumonia on CXR but was seen by PCP who ordered CXR days ago. Been getting IV antibiotics at the office of Rocephin and zithromax. Pt failing outpatient treatment and sent by PCP for possible admission.  Pt pending CT chest at this time    I have reviewed and interpreted all of the currently available lab results from this visit (if applicable):  Results for orders placed or performed during the hospital encounter of 03/17/22   COVID-19, Rapid    Specimen: Nasopharyngeal Swab   Result Value Ref Range    SARS-CoV-2, NAAT Not Detected Not Detected   Lactic Acid   Result Value Ref Range    Lactic Acid 1.8 0.4 - 2.0 mmol/L   CBC with Auto Differential   Result Value Ref Range    WBC 10.4 4.0 - 11.0 K/uL    RBC 3.59 (L) 4.50 - 6.00 M/uL    Hemoglobin 10.6 (L) 13.0 - 18.0 g/dL    Hematocrit 32.6 (L) 40.0 - 54.0 %    MCV 90.8 80.0 - 100.0 fL    MCH 29.5 27.0 - 32.0 pg    MCHC 32.5 31.0 - 35.0 g/dL    RDW 13.2 11.0 - 16.0 %    Platelets 481 (H) 700 - 400 K/uL    MPV 8.4 6.0 - 10.0 fL    Neutrophils % 70.0 %    Immature Granulocytes % 0.4 0.0 - 5.0 %    Lymphocytes % 14.8 %    Monocytes % 12.3 %    Eosinophils % 2.3 %    Basophils % 0.2 %    Neutrophils Absolute 7.3 2.0 - 7.5 K/uL    Immature Granulocytes # 0.0 K/uL    Lymphocytes Absolute 1.5 1.5 - 4.0 K/uL    Monocytes Absolute 1.3 (H) 0.2 - 0.8 K/uL    Eosinophils Absolute 0.2 0.0 - 0.4 K/uL    Basophils Absolute 0.0 0.0 - 0.1 K/uL   SPECIMEN REJECTION   Result Value Ref Range    Rejected Test cmp trop     Reason for Rejection see below    Comprehensive Metabolic Panel   Result Value Ref Range    Sodium 135 (L) 136 - 145 mmol/L

## 2022-03-18 NOTE — ED NOTES
[address] called, patient has been assigned a bed at [facility]. Patient report can be called to [phone]. Millicent Duverney, RN  03/18/22 121  Above note documented on wrong patient.      Millicent Duverney, RN  03/18/22 4202

## 2022-03-18 NOTE — ED NOTES
Dr Powell Generous states patient has been accepted by Dr Mirta Osborne at [facility] and are currently waiting for bed placement.      Pia Rodriguez RN  03/18/22 7780

## 2022-03-18 NOTE — ED NOTES
Pt lying in bed, eys closed, resp non-labored.  No problems or concerns voiced      Anuradha Delarosa RN  03/18/22 9108

## 2022-03-18 NOTE — ED NOTES
Complete bed change performed due to feces and urine soaked bed. Clean gown and brief placed on Pt. Pt placed in POC and breakfast provided from dietary. Plan of care and admission reviewed. Pt verbalizes understanding.      Honey Verduzco - Student ISABELL Roa RN  03/18/22 1711

## 2022-03-22 LAB
BLOOD CULTURE, ROUTINE: NORMAL
CULTURE, BLOOD 2: NORMAL

## 2022-05-24 ENCOUNTER — HOSPITAL ENCOUNTER (OUTPATIENT)
Dept: ULTRASOUND IMAGING | Facility: HOSPITAL | Age: 62
Discharge: HOME | End: 2022-05-24
Payer: MEDICAID

## 2022-05-24 DIAGNOSIS — E04.1 THYROID NODULE: ICD-10-CM

## 2022-05-24 PROCEDURE — 76536 US EXAM OF HEAD AND NECK: CPT

## 2022-07-03 ENCOUNTER — APPOINTMENT (OUTPATIENT)
Dept: GENERAL RADIOLOGY | Facility: HOSPITAL | Age: 62
DRG: 871 | End: 2022-07-03
Payer: MEDICAID

## 2022-07-03 ENCOUNTER — HOSPITAL ENCOUNTER (INPATIENT)
Facility: HOSPITAL | Age: 62
LOS: 3 days | Discharge: HOME | DRG: 871 | End: 2022-07-06
Attending: EMERGENCY MEDICINE | Admitting: INTERNAL MEDICINE
Payer: MEDICAID

## 2022-07-03 DIAGNOSIS — J44.1 COPD EXACERBATION (HCC): ICD-10-CM

## 2022-07-03 DIAGNOSIS — R09.02 HYPOXIA: ICD-10-CM

## 2022-07-03 DIAGNOSIS — J18.9 PNEUMONIA OF RIGHT LOWER LOBE DUE TO INFECTIOUS ORGANISM: Primary | ICD-10-CM

## 2022-07-03 LAB
A/G RATIO: 1.5 (ref 0.8–2)
ALBUMIN SERPL-MCNC: 4.2 G/DL (ref 3.4–4.8)
ALP BLD-CCNC: 74 U/L (ref 25–100)
ALT SERPL-CCNC: 17 U/L (ref 4–36)
ANION GAP SERPL CALCULATED.3IONS-SCNC: 11 MMOL/L (ref 3–16)
AST SERPL-CCNC: 9 U/L (ref 8–33)
BASE EXCESS ARTERIAL: 3.1 MMOL/L (ref -3–3)
BASOPHILS ABSOLUTE: 0 K/UL (ref 0–0.1)
BASOPHILS RELATIVE PERCENT: 0.3 %
BILIRUB SERPL-MCNC: 0.3 MG/DL (ref 0.3–1.2)
BILIRUBIN URINE: NEGATIVE
BLOOD, URINE: NEGATIVE
BUN BLDV-MCNC: 18 MG/DL (ref 6–20)
CALCIUM SERPL-MCNC: 9.5 MG/DL (ref 8.5–10.5)
CHLORIDE BLD-SCNC: 91 MMOL/L (ref 98–107)
CLARITY: CLEAR
CO2: 27 MMOL/L (ref 20–30)
COLOR: YELLOW
CREAT SERPL-MCNC: 1.1 MG/DL (ref 0.4–1.2)
EOSINOPHILS ABSOLUTE: 0 K/UL (ref 0–0.4)
EOSINOPHILS RELATIVE PERCENT: 0.1 %
FIO2: 0.28 %
GFR AFRICAN AMERICAN: >59
GFR NON-AFRICAN AMERICAN: >59
GLOBULIN: 2.8 G/DL
GLUCOSE BLD-MCNC: 197 MG/DL (ref 74–106)
GLUCOSE URINE: >=1000 MG/DL
HCO3 ARTERIAL: 27.5 MMOL/L (ref 22–26)
HCT VFR BLD CALC: 37 % (ref 40–54)
HEMOGLOBIN: 12.3 G/DL (ref 13–18)
IMMATURE GRANULOCYTES #: 0 K/UL
IMMATURE GRANULOCYTES %: 0.3 % (ref 0–5)
KETONES, URINE: ABNORMAL MG/DL
LEUKOCYTE ESTERASE, URINE: NEGATIVE
LYMPHOCYTES ABSOLUTE: 1.1 K/UL (ref 1.5–4)
LYMPHOCYTES RELATIVE PERCENT: 8.9 %
MCH RBC QN AUTO: 28.9 PG (ref 27–32)
MCHC RBC AUTO-ENTMCNC: 33.2 G/DL (ref 31–35)
MCV RBC AUTO: 87.1 FL (ref 80–100)
MICROSCOPIC EXAMINATION: ABNORMAL
MONOCYTES ABSOLUTE: 1.3 K/UL (ref 0.2–0.8)
MONOCYTES RELATIVE PERCENT: 10.1 %
NEUTROPHILS ABSOLUTE: 10 K/UL (ref 2–7.5)
NEUTROPHILS RELATIVE PERCENT: 80.3 %
NITRITE, URINE: NEGATIVE
O2 SAT, ARTERIAL: 97.5 %
O2 THERAPY: ABNORMAL
PCO2 ARTERIAL: 41.5 MMHG (ref 35–45)
PDW BLD-RTO: 13.9 % (ref 11–16)
PH ARTERIAL: 7.44 (ref 7.35–7.45)
PH UA: 5 (ref 5–8)
PLATELET # BLD: 352 K/UL (ref 150–400)
PMV BLD AUTO: 8 FL (ref 6–10)
PO2 ARTERIAL: 95.1 MMHG (ref 80–100)
POTASSIUM SERPL-SCNC: 4.5 MMOL/L (ref 3.4–5.1)
PRO-BNP: 434 PG/ML (ref 0–1800)
PROTEIN UA: NEGATIVE MG/DL
RBC # BLD: 4.25 M/UL (ref 4.5–6)
SARS-COV-2, NAAT: NOT DETECTED
SODIUM BLD-SCNC: 129 MMOL/L (ref 136–145)
SPECIFIC GRAVITY UA: 1.01 (ref 1–1.03)
TCO2 ARTERIAL: 28.8 MMOL/L (ref 24–30)
TOTAL PROTEIN: 7 G/DL (ref 6.4–8.3)
TROPONIN: <0.3 NG/ML
URINE REFLEX TO CULTURE: ABNORMAL
URINE TYPE: ABNORMAL
UROBILINOGEN, URINE: 0.2 E.U./DL
WBC # BLD: 12.5 K/UL (ref 4–11)

## 2022-07-03 PROCEDURE — 87635 SARS-COV-2 COVID-19 AMP PRB: CPT

## 2022-07-03 PROCEDURE — 6370000000 HC RX 637 (ALT 250 FOR IP)

## 2022-07-03 PROCEDURE — 82803 BLOOD GASES ANY COMBINATION: CPT

## 2022-07-03 PROCEDURE — 1200000000 HC SEMI PRIVATE

## 2022-07-03 PROCEDURE — 99285 EMERGENCY DEPT VISIT HI MDM: CPT

## 2022-07-03 PROCEDURE — 81003 URINALYSIS AUTO W/O SCOPE: CPT

## 2022-07-03 PROCEDURE — 36415 COLL VENOUS BLD VENIPUNCTURE: CPT

## 2022-07-03 PROCEDURE — 2580000003 HC RX 258: Performed by: EMERGENCY MEDICINE

## 2022-07-03 PROCEDURE — 6360000002 HC RX W HCPCS

## 2022-07-03 PROCEDURE — 83880 ASSAY OF NATRIURETIC PEPTIDE: CPT

## 2022-07-03 PROCEDURE — 6360000002 HC RX W HCPCS: Performed by: EMERGENCY MEDICINE

## 2022-07-03 PROCEDURE — 84484 ASSAY OF TROPONIN QUANT: CPT

## 2022-07-03 PROCEDURE — 87040 BLOOD CULTURE FOR BACTERIA: CPT

## 2022-07-03 PROCEDURE — 85025 COMPLETE CBC W/AUTO DIFF WBC: CPT

## 2022-07-03 PROCEDURE — 36600 WITHDRAWAL OF ARTERIAL BLOOD: CPT

## 2022-07-03 PROCEDURE — 96374 THER/PROPH/DIAG INJ IV PUSH: CPT

## 2022-07-03 PROCEDURE — 94644 CONT INHLJ TX 1ST HOUR: CPT

## 2022-07-03 PROCEDURE — 6370000000 HC RX 637 (ALT 250 FOR IP): Performed by: EMERGENCY MEDICINE

## 2022-07-03 PROCEDURE — 71045 X-RAY EXAM CHEST 1 VIEW: CPT

## 2022-07-03 PROCEDURE — 80053 COMPREHEN METABOLIC PANEL: CPT

## 2022-07-03 RX ORDER — AZITHROMYCIN 250 MG/1
TABLET, FILM COATED ORAL
Status: COMPLETED
Start: 2022-07-03 | End: 2022-07-03

## 2022-07-03 RX ORDER — AZITHROMYCIN 250 MG/1
500 TABLET, FILM COATED ORAL DAILY
Status: DISCONTINUED | OUTPATIENT
Start: 2022-07-04 | End: 2022-07-04

## 2022-07-03 RX ORDER — DEXAMETHASONE SODIUM PHOSPHATE 10 MG/ML
10 INJECTION INTRAMUSCULAR; INTRAVENOUS ONCE
Status: COMPLETED | OUTPATIENT
Start: 2022-07-03 | End: 2022-07-03

## 2022-07-03 RX ORDER — ACETAMINOPHEN 500 MG
1000 TABLET ORAL ONCE
Status: COMPLETED | OUTPATIENT
Start: 2022-07-03 | End: 2022-07-03

## 2022-07-03 RX ORDER — ACETAMINOPHEN 500 MG
TABLET ORAL
Status: DISPENSED
Start: 2022-07-03 | End: 2022-07-04

## 2022-07-03 RX ADMIN — ALBUTEROL SULFATE 15 MG: 5 SOLUTION RESPIRATORY (INHALATION) at 20:40

## 2022-07-03 RX ADMIN — AZITHROMYCIN MONOHYDRATE 500 MG: 250 TABLET ORAL at 22:40

## 2022-07-03 RX ADMIN — ACETAMINOPHEN 1000 MG: 500 TABLET, FILM COATED ORAL at 21:41

## 2022-07-03 RX ADMIN — CEFTRIAXONE SODIUM 1000 MG: 1 INJECTION, POWDER, FOR SOLUTION INTRAMUSCULAR; INTRAVENOUS at 22:42

## 2022-07-03 RX ADMIN — DEXAMETHASONE SODIUM PHOSPHATE 10 MG: 10 INJECTION INTRAMUSCULAR; INTRAVENOUS at 21:41

## 2022-07-03 ASSESSMENT — PAIN SCALES - GENERAL: PAINLEVEL_OUTOF10: 0

## 2022-07-04 ENCOUNTER — APPOINTMENT (OUTPATIENT)
Dept: CT IMAGING | Facility: HOSPITAL | Age: 62
DRG: 871 | End: 2022-07-04
Payer: MEDICAID

## 2022-07-04 PROBLEM — A41.9 SEPSIS (HCC): Status: ACTIVE | Noted: 2022-07-04

## 2022-07-04 PROBLEM — J44.1 COPD EXACERBATION (HCC): Status: ACTIVE | Noted: 2022-07-04

## 2022-07-04 LAB
ANION GAP SERPL CALCULATED.3IONS-SCNC: 13 MMOL/L (ref 3–16)
BASE EXCESS ARTERIAL: -0.6 MMOL/L (ref -3–3)
BASOPHILS ABSOLUTE: 0 K/UL (ref 0–0.1)
BASOPHILS RELATIVE PERCENT: 0.1 %
BUN BLDV-MCNC: 24 MG/DL (ref 6–20)
CALCIUM SERPL-MCNC: 9.7 MG/DL (ref 8.5–10.5)
CHLORIDE BLD-SCNC: 93 MMOL/L (ref 98–107)
CO2: 24 MMOL/L (ref 20–30)
CREAT SERPL-MCNC: 1.3 MG/DL (ref 0.4–1.2)
EOSINOPHILS ABSOLUTE: 0 K/UL (ref 0–0.4)
EOSINOPHILS RELATIVE PERCENT: 0 %
FIO2: 0.28 %
GFR AFRICAN AMERICAN: >59
GFR NON-AFRICAN AMERICAN: 56
GLUCOSE BLD-MCNC: 117 MG/DL (ref 74–106)
GLUCOSE BLD-MCNC: 167 MG/DL (ref 74–106)
GLUCOSE BLD-MCNC: 170 MG/DL (ref 74–106)
GLUCOSE BLD-MCNC: 191 MG/DL (ref 74–106)
GLUCOSE BLD-MCNC: 195 MG/DL (ref 74–106)
GLUCOSE BLD-MCNC: 199 MG/DL (ref 74–106)
GLUCOSE BLD-MCNC: 216 MG/DL (ref 74–106)
HCO3 ARTERIAL: 25.7 MMOL/L (ref 22–26)
HCT VFR BLD CALC: 37.2 % (ref 40–54)
HEMOGLOBIN: 11.9 G/DL (ref 13–18)
IMMATURE GRANULOCYTES #: 0.1 K/UL
IMMATURE GRANULOCYTES %: 0.6 % (ref 0–5)
LYMPHOCYTES ABSOLUTE: 1 K/UL (ref 1.5–4)
LYMPHOCYTES RELATIVE PERCENT: 6.5 %
MCH RBC QN AUTO: 28.9 PG (ref 27–32)
MCHC RBC AUTO-ENTMCNC: 32 G/DL (ref 31–35)
MCV RBC AUTO: 90.3 FL (ref 80–100)
MONOCYTES ABSOLUTE: 0.5 K/UL (ref 0.2–0.8)
MONOCYTES RELATIVE PERCENT: 3.5 %
NEUTROPHILS ABSOLUTE: 13.7 K/UL (ref 2–7.5)
NEUTROPHILS RELATIVE PERCENT: 89.3 %
O2 SAT, ARTERIAL: 97.5 %
O2 THERAPY: ABNORMAL
PCO2 ARTERIAL: 49.2 MMHG (ref 35–45)
PDW BLD-RTO: 14.1 % (ref 11–16)
PERFORMED ON: ABNORMAL
PH ARTERIAL: 7.33 (ref 7.35–7.45)
PLATELET # BLD: 376 K/UL (ref 150–400)
PMV BLD AUTO: 8.4 FL (ref 6–10)
PO2 ARTERIAL: 103.3 MMHG (ref 80–100)
POTASSIUM REFLEX MAGNESIUM: 4.6 MMOL/L (ref 3.4–5.1)
RBC # BLD: 4.12 M/UL (ref 4.5–6)
SODIUM BLD-SCNC: 130 MMOL/L (ref 136–145)
TCO2 ARTERIAL: 27.2 MMOL/L (ref 24–30)
WBC # BLD: 15.4 K/UL (ref 4–11)

## 2022-07-04 PROCEDURE — 6360000002 HC RX W HCPCS: Performed by: PHYSICIAN ASSISTANT

## 2022-07-04 PROCEDURE — 36600 WITHDRAWAL OF ARTERIAL BLOOD: CPT

## 2022-07-04 PROCEDURE — 6370000000 HC RX 637 (ALT 250 FOR IP): Performed by: INTERNAL MEDICINE

## 2022-07-04 PROCEDURE — 1200000000 HC SEMI PRIVATE

## 2022-07-04 PROCEDURE — 80048 BASIC METABOLIC PNL TOTAL CA: CPT

## 2022-07-04 PROCEDURE — 94640 AIRWAY INHALATION TREATMENT: CPT

## 2022-07-04 PROCEDURE — 6360000002 HC RX W HCPCS: Performed by: INTERNAL MEDICINE

## 2022-07-04 PROCEDURE — 70450 CT HEAD/BRAIN W/O DYE: CPT

## 2022-07-04 PROCEDURE — 2580000003 HC RX 258: Performed by: INTERNAL MEDICINE

## 2022-07-04 PROCEDURE — 36415 COLL VENOUS BLD VENIPUNCTURE: CPT

## 2022-07-04 PROCEDURE — 82803 BLOOD GASES ANY COMBINATION: CPT

## 2022-07-04 PROCEDURE — 99222 1ST HOSP IP/OBS MODERATE 55: CPT | Performed by: INTERNAL MEDICINE

## 2022-07-04 PROCEDURE — 2700000000 HC OXYGEN THERAPY PER DAY

## 2022-07-04 PROCEDURE — 85025 COMPLETE CBC W/AUTO DIFF WBC: CPT

## 2022-07-04 PROCEDURE — 94761 N-INVAS EAR/PLS OXIMETRY MLT: CPT

## 2022-07-04 PROCEDURE — 6370000000 HC RX 637 (ALT 250 FOR IP): Performed by: PHYSICIAN ASSISTANT

## 2022-07-04 PROCEDURE — 2580000003 HC RX 258: Performed by: PHYSICIAN ASSISTANT

## 2022-07-04 PROCEDURE — 94660 CPAP INITIATION&MGMT: CPT

## 2022-07-04 RX ORDER — ATORVASTATIN CALCIUM 80 MG/1
80 TABLET, FILM COATED ORAL DAILY
Status: DISCONTINUED | OUTPATIENT
Start: 2022-07-04 | End: 2022-07-06 | Stop reason: HOSPADM

## 2022-07-04 RX ORDER — AZITHROMYCIN 250 MG/1
250 TABLET, FILM COATED ORAL DAILY
Status: DISCONTINUED | OUTPATIENT
Start: 2022-07-05 | End: 2022-07-06 | Stop reason: HOSPADM

## 2022-07-04 RX ORDER — BUDESONIDE 0.5 MG/2ML
0.5 INHALANT ORAL 2 TIMES DAILY
Status: DISCONTINUED | OUTPATIENT
Start: 2022-07-05 | End: 2022-07-06 | Stop reason: HOSPADM

## 2022-07-04 RX ORDER — RISPERIDONE 0.5 MG/1
0.25 TABLET, FILM COATED ORAL 2 TIMES DAILY
Status: DISCONTINUED | OUTPATIENT
Start: 2022-07-04 | End: 2022-07-06 | Stop reason: HOSPADM

## 2022-07-04 RX ORDER — BUDESONIDE 0.5 MG/2ML
0.5 INHALANT ORAL 2 TIMES DAILY
Status: DISCONTINUED | OUTPATIENT
Start: 2022-07-04 | End: 2022-07-04

## 2022-07-04 RX ORDER — QUETIAPINE FUMARATE 100 MG/1
100 TABLET, FILM COATED ORAL NIGHTLY
Status: DISCONTINUED | OUTPATIENT
Start: 2022-07-04 | End: 2022-07-06 | Stop reason: HOSPADM

## 2022-07-04 RX ORDER — ALOGLIPTIN 12.5 MG/1
25 TABLET, FILM COATED ORAL DAILY
Status: DISCONTINUED | OUTPATIENT
Start: 2022-07-04 | End: 2022-07-06 | Stop reason: HOSPADM

## 2022-07-04 RX ORDER — PANTOPRAZOLE SODIUM 40 MG/1
40 TABLET, DELAYED RELEASE ORAL
Status: DISCONTINUED | OUTPATIENT
Start: 2022-07-04 | End: 2022-07-06 | Stop reason: HOSPADM

## 2022-07-04 RX ORDER — IPRATROPIUM BROMIDE AND ALBUTEROL SULFATE 2.5; .5 MG/3ML; MG/3ML
1 SOLUTION RESPIRATORY (INHALATION)
Status: DISCONTINUED | OUTPATIENT
Start: 2022-07-04 | End: 2022-07-04

## 2022-07-04 RX ORDER — IPRATROPIUM BROMIDE AND ALBUTEROL SULFATE 2.5; .5 MG/3ML; MG/3ML
1 SOLUTION RESPIRATORY (INHALATION)
Status: DISCONTINUED | OUTPATIENT
Start: 2022-07-05 | End: 2022-07-06 | Stop reason: HOSPADM

## 2022-07-04 RX ORDER — ONDANSETRON 4 MG/1
4 TABLET, ORALLY DISINTEGRATING ORAL EVERY 8 HOURS PRN
Status: DISCONTINUED | OUTPATIENT
Start: 2022-07-04 | End: 2022-07-06 | Stop reason: HOSPADM

## 2022-07-04 RX ORDER — ACETAMINOPHEN 325 MG/1
650 TABLET ORAL EVERY 6 HOURS PRN
Status: DISCONTINUED | OUTPATIENT
Start: 2022-07-04 | End: 2022-07-06 | Stop reason: HOSPADM

## 2022-07-04 RX ORDER — ACETAMINOPHEN 650 MG/1
650 SUPPOSITORY RECTAL EVERY 6 HOURS PRN
Status: DISCONTINUED | OUTPATIENT
Start: 2022-07-04 | End: 2022-07-06 | Stop reason: HOSPADM

## 2022-07-04 RX ORDER — ENOXAPARIN SODIUM 100 MG/ML
40 INJECTION SUBCUTANEOUS DAILY
Status: DISCONTINUED | OUTPATIENT
Start: 2022-07-04 | End: 2022-07-06 | Stop reason: HOSPADM

## 2022-07-04 RX ORDER — GLIPIZIDE 10 MG/1
10 TABLET ORAL
Status: DISCONTINUED | OUTPATIENT
Start: 2022-07-04 | End: 2022-07-06 | Stop reason: HOSPADM

## 2022-07-04 RX ORDER — INSULIN LISPRO 100 [IU]/ML
0-6 INJECTION, SOLUTION INTRAVENOUS; SUBCUTANEOUS
Status: DISCONTINUED | OUTPATIENT
Start: 2022-07-04 | End: 2022-07-06 | Stop reason: HOSPADM

## 2022-07-04 RX ORDER — INSULIN LISPRO 100 [IU]/ML
0-3 INJECTION, SOLUTION INTRAVENOUS; SUBCUTANEOUS NIGHTLY
Status: DISCONTINUED | OUTPATIENT
Start: 2022-07-04 | End: 2022-07-06 | Stop reason: HOSPADM

## 2022-07-04 RX ORDER — SODIUM CHLORIDE 9 MG/ML
INJECTION, SOLUTION INTRAVENOUS CONTINUOUS
Status: DISCONTINUED | OUTPATIENT
Start: 2022-07-04 | End: 2022-07-05

## 2022-07-04 RX ORDER — GUAIFENESIN 600 MG/1
600 TABLET, EXTENDED RELEASE ORAL 2 TIMES DAILY
Status: DISCONTINUED | OUTPATIENT
Start: 2022-07-04 | End: 2022-07-06 | Stop reason: HOSPADM

## 2022-07-04 RX ORDER — DIVALPROEX SODIUM 250 MG/1
250 TABLET, EXTENDED RELEASE ORAL NIGHTLY
Status: DISCONTINUED | OUTPATIENT
Start: 2022-07-04 | End: 2022-07-06 | Stop reason: HOSPADM

## 2022-07-04 RX ORDER — ASPIRIN 81 MG/1
81 TABLET, CHEWABLE ORAL DAILY
Status: DISCONTINUED | OUTPATIENT
Start: 2022-07-04 | End: 2022-07-06 | Stop reason: HOSPADM

## 2022-07-04 RX ORDER — PROPRANOLOL HYDROCHLORIDE 20 MG/1
20 TABLET ORAL 2 TIMES DAILY
Status: DISCONTINUED | OUTPATIENT
Start: 2022-07-04 | End: 2022-07-06 | Stop reason: HOSPADM

## 2022-07-04 RX ORDER — ONDANSETRON 2 MG/ML
4 INJECTION INTRAMUSCULAR; INTRAVENOUS EVERY 6 HOURS PRN
Status: DISCONTINUED | OUTPATIENT
Start: 2022-07-04 | End: 2022-07-06 | Stop reason: HOSPADM

## 2022-07-04 RX ORDER — POLYETHYLENE GLYCOL 3350 17 G/17G
17 POWDER, FOR SOLUTION ORAL DAILY PRN
Status: DISCONTINUED | OUTPATIENT
Start: 2022-07-04 | End: 2022-07-06 | Stop reason: HOSPADM

## 2022-07-04 RX ADMIN — GLIPIZIDE 10 MG: 10 TABLET ORAL at 06:00

## 2022-07-04 RX ADMIN — QUETIAPINE FUMARATE 100 MG: 100 TABLET ORAL at 01:03

## 2022-07-04 RX ADMIN — DIVALPROEX SODIUM 250 MG: 250 TABLET, EXTENDED RELEASE ORAL at 20:17

## 2022-07-04 RX ADMIN — GUAIFENESIN 600 MG: 600 TABLET, EXTENDED RELEASE ORAL at 20:17

## 2022-07-04 RX ADMIN — INSULIN LISPRO 1 UNITS: 100 INJECTION, SOLUTION INTRAVENOUS; SUBCUTANEOUS at 17:37

## 2022-07-04 RX ADMIN — GUAIFENESIN 600 MG: 600 TABLET, EXTENDED RELEASE ORAL at 14:33

## 2022-07-04 RX ADMIN — PANTOPRAZOLE SODIUM 40 MG: 40 TABLET, DELAYED RELEASE ORAL at 06:00

## 2022-07-04 RX ADMIN — PROPRANOLOL HYDROCHLORIDE 20 MG: 20 TABLET ORAL at 11:02

## 2022-07-04 RX ADMIN — SODIUM CHLORIDE: 9 INJECTION, SOLUTION INTRAVENOUS at 14:31

## 2022-07-04 RX ADMIN — IPRATROPIUM BROMIDE AND ALBUTEROL SULFATE 1 AMPULE: 2.5; .5 SOLUTION RESPIRATORY (INHALATION) at 12:56

## 2022-07-04 RX ADMIN — ATORVASTATIN CALCIUM 80 MG: 80 TABLET, FILM COATED ORAL at 11:00

## 2022-07-04 RX ADMIN — ASPIRIN 81 MG 81 MG: 81 TABLET ORAL at 10:59

## 2022-07-04 RX ADMIN — SERTRALINE HYDROCHLORIDE 100 MG: 50 TABLET ORAL at 10:59

## 2022-07-04 RX ADMIN — RISPERIDONE 0.25 MG: 0.5 TABLET ORAL at 11:00

## 2022-07-04 RX ADMIN — BUDESONIDE 500 MCG: 0.5 INHALANT RESPIRATORY (INHALATION) at 16:52

## 2022-07-04 RX ADMIN — METFORMIN HYDROCHLORIDE 1000 MG: 500 TABLET, FILM COATED ORAL at 11:00

## 2022-07-04 RX ADMIN — METFORMIN HYDROCHLORIDE 1000 MG: 500 TABLET, FILM COATED ORAL at 17:35

## 2022-07-04 RX ADMIN — INSULIN LISPRO 1 UNITS: 100 INJECTION, SOLUTION INTRAVENOUS; SUBCUTANEOUS at 11:05

## 2022-07-04 RX ADMIN — INSULIN LISPRO 1 UNITS: 100 INJECTION, SOLUTION INTRAVENOUS; SUBCUTANEOUS at 20:19

## 2022-07-04 RX ADMIN — GLIPIZIDE 10 MG: 10 TABLET ORAL at 17:36

## 2022-07-04 RX ADMIN — ENOXAPARIN SODIUM 40 MG: 100 INJECTION SUBCUTANEOUS at 11:02

## 2022-07-04 RX ADMIN — IPRATROPIUM BROMIDE AND ALBUTEROL SULFATE 1 AMPULE: 2.5; .5 SOLUTION RESPIRATORY (INHALATION) at 05:59

## 2022-07-04 RX ADMIN — IPRATROPIUM BROMIDE AND ALBUTEROL SULFATE 1 AMPULE: 2.5; .5 SOLUTION RESPIRATORY (INHALATION) at 16:51

## 2022-07-04 RX ADMIN — PROPRANOLOL HYDROCHLORIDE 20 MG: 20 TABLET ORAL at 01:02

## 2022-07-04 RX ADMIN — ALOGLIPTIN 25 MG: 12.5 TABLET, FILM COATED ORAL at 11:01

## 2022-07-04 RX ADMIN — PROPRANOLOL HYDROCHLORIDE 20 MG: 20 TABLET ORAL at 20:17

## 2022-07-04 RX ADMIN — AZITHROMYCIN MONOHYDRATE 500 MG: 250 TABLET ORAL at 11:01

## 2022-07-04 RX ADMIN — INSULIN LISPRO 1 UNITS: 100 INJECTION, SOLUTION INTRAVENOUS; SUBCUTANEOUS at 01:05

## 2022-07-04 RX ADMIN — CEFTRIAXONE 1000 MG: 1 INJECTION, POWDER, FOR SOLUTION INTRAMUSCULAR; INTRAVENOUS at 14:32

## 2022-07-04 RX ADMIN — RISPERIDONE 0.25 MG: 0.5 TABLET ORAL at 01:03

## 2022-07-04 RX ADMIN — DIVALPROEX SODIUM 250 MG: 250 TABLET, EXTENDED RELEASE ORAL at 01:02

## 2022-07-04 NOTE — PROGRESS NOTES
Pt arrived to floor from ED via stretcher. Patient able to ambulate from stretch to bedside independently. Patient is incontinent and has his own pull ups because he stated he was allergic to the ones here at the hospital.  Vitals taken and recorded. Head to toe assessment done. Appropriate meds administered. HOB elevated. Call light with in reach.

## 2022-07-04 NOTE — PLAN OF CARE
Problem: Safety - Adult  Goal: Free from fall injury  Outcome: Progressing     Problem: ABCDS Injury Assessment  Goal: Absence of physical injury  Outcome: Progressing     Problem: Discharge Planning  Goal: Discharge to home or other facility with appropriate resources  Recent Flowsheet Documentation  Taken 7/4/2022 0016 by Charlene Soler RN  Discharge to home or other facility with appropriate resources:   Identify barriers to discharge with patient and caregiver   Arrange for needed discharge resources and transportation as appropriate   Identify discharge learning needs (meds, wound care, etc)

## 2022-07-04 NOTE — ED PROVIDER NOTES
[address] [place] [address] SURG  eMERGENCY dEPARTMENT eNCOUnter      Pt Name: Safia Reid  MRN: [id number]  YOB: 1960  Date of evaluation: 8/7/7429  Provider: Mariangel Barnes MD    CHIEF COMPLAINT       Chief Complaint   Patient presents with    Shortness of Breath     C/o sob,family advises pt.has been coughing today and sob. HISTORY OF PRESENT ILLNESS  (Location/Symptom, Timing/Onset, Context/Setting, Quality, Duration,Modifying Factors, Severity.)   Safia Reid is a 64 y.o. male who presents to the emergency department with 1 week of cough productive of clear sputum. He also reports shortness of breath which started today. Pulse ox at home showed 74%. No fever. Nephew reports 3 days of wheezing and rattling in his chest.  He had pneumonia in the past and family was told it was from aspirating. He has diarrhea x 2 days. He vomited twice today. He has COPD and has inhalers and nebulizer machine. He used the nebulizer this morning but will not use it more than that. He is supposed to be doing it 4 times a day but he will not do it more. He is not on oxygen at home. Patient quit smoking 5 months ago. He is vaccinated for COVID.  + booster. Nursing notes were reviewed. REVIEW OF SYSTEMS    (2-9 systems for level 4, 10 or more for level 5)   ROS:  General:  No fevers, no chills, + weakness  Cardiovascular:  No chest pain, no palpitations  Respiratory:  + shortness of breath, + cough, + wheezing  Gastrointestinal:  No pain, no nausea, + vomiting, + diarrhea  Musculoskeletal:  No muscle pain, no joint pain  Skin:  No rash, no easy bruising  Neurologic:  No speech problems, no headache, no extremity numbness, no extremity  tingling, no extremity weakness  Psychiatric:  No anxiety  Genitourinary:  No dysuria, no hematuria    Except as noted above the remainder of the review of systems was reviewed and negative.        PAST MEDICAL HISTORY     Past Medical History:   Diagnosis Date    Anxiety     B12 deficiency     Cancer ([facility] [address]     Prostate    Chronic constipation     DDD (degenerative disc disease), lumbar     Depression     Diabetes mellitus (HCC)     GERD (gastroesophageal reflux disease)     Hyperlipidemia     Hypertension     Lower back pain          SURGICAL HISTORY       Past Surgical History:   Procedure Laterality Date    CHOLECYSTECTOMY      PROSTATECTOMY  2018         CURRENT MEDICATIONS       Current Discharge Medication List      CONTINUE these medications which have NOT CHANGED    Details   QUEtiapine (SEROQUEL) 100 MG tablet Take 1 tablet by mouth nightly  Qty: 60 tablet, Refills: 3      risperiDONE (RISPERDAL) 0.25 MG tablet Take 1 tablet by mouth 2 times daily  Qty: 60 tablet, Refills: 3      propranolol (INDERAL) 20 MG tablet Take 1 tablet by mouth 2 times daily  Qty: 90 tablet, Refills: 3      diclofenac sodium (VOLTAREN) 1 % GEL Apply 2 g topically 2 times daily  Qty: 50 g, Refills: 0      vitamin D (ERGOCALCIFEROL) 1.25 MG (86205 UT) CAPS capsule Take 1 capsule by mouth once a week  Qty: 5 capsule, Refills: 0      divalproex (DEPAKOTE ER) 250 MG extended release tablet Take 250 mg by mouth nightly      sucralfate (CARAFATE) 1 GM tablet Take 1 g by mouth 3 times daily      metFORMIN (GLUCOPHAGE) 1000 MG tablet Take 1,000 mg by mouth 2 times daily (with meals)      SITagliptin (JANUVIA) 100 MG tablet Take 100 mg by mouth daily      pantoprazole sodium (PROTONIX) 40 MG PACK packet Take 40 mg by mouth every morning (before breakfast)      glipiZIDE (GLUCOTROL) 10 MG tablet Take 10 mg by mouth 2 times daily (before meals)      empagliflozin (JARDIANCE) 10 MG tablet Take 10 mg by mouth daily      aspirin 81 MG tablet Take 81 mg by mouth daily      fenofibrate 160 MG tablet Take 160 mg by mouth daily      sertraline (ZOLOFT) 100 MG tablet Take 100 mg by mouth daily      atorvastatin (LIPITOR) 80 MG tablet Take 80 mg by mouth daily             ALLERGIES Patient has no known allergies. FAMILY HISTORY     History reviewed. No pertinent family history. SOCIAL HISTORY       Social History     Socioeconomic History    Marital status: Single     Spouse name: None    Number of children: None    Years of education: None    Highest education level: None   Occupational History    None   Tobacco Use    Smoking status: Former Smoker     Packs/day: 2.00     Years: 25.00     Pack years: 50.00     Types: Cigarettes     Quit date: 2022     Years since quittin.4    Smokeless tobacco: Current User     Types: Chew    Tobacco comment: Quit smoking 6 months ago. Substance and Sexual Activity    Alcohol use: No    Drug use: No    Sexual activity: Not Currently     Partners: Female   Other Topics Concern    None   Social History Narrative    None     Social Determinants of Health     Financial Resource Strain:     Difficulty of Paying Living Expenses: Not on file   Food Insecurity:     Worried About Running Out of Food in the Last Year: Not on file    Armando of Food in the Last Year: Not on file   Transportation Needs:     Lack of Transportation (Medical): Not on file    Lack of Transportation (Non-Medical):  Not on file   Physical Activity:     Days of Exercise per Week: Not on file    Minutes of Exercise per Session: Not on file   Stress:     Feeling of Stress : Not on file   Social Connections:     Frequency of Communication with Friends and Family: Not on file    Frequency of Social Gatherings with Friends and Family: Not on file    Attends [employer] Services: Not on file    Active Member of Clubs or Organizations: Not on file    Attends Club or Organization Meetings: Not on file    Marital Status: Not on file   Intimate Partner Violence:     Fear of Current or Ex-Partner: Not on file    Emotionally Abused: Not on file    Physically Abused: Not on file    Sexually Abused: Not on file   Housing Stability:     Unable to Pay for Housing in the Last Year: Not on file    Number of Places Lived in the Last Year: Not on file    Unstable Housing in the Last Year: Not on file         PHYSICAL EXAM    (up to 7 for level 4, 8 or more for level 5)     ED Triage Vitals   BP Temp Temp src Pulse Resp SpO2 Height Weight   -- -- -- -- -- -- -- --       Physical Exam  General :Patient is awake, alert, oriented, in no acute distress, nontoxic appearing  HEENT: Pupils are equally round and reactive to light, EOMI. Oral mucosa is moist, no exudate. No pharyngeal erythema. Neck: Neck is supple, full range of motion  Cardiac: Heart regular rate, rhythm, no murmurs, rubs, or gallops  Lungs: Very diminished bilaterally with few rales over the right base. Few scattered end expiratory wheezes. Patient is tachypneic with increased work of breathing  Chest wall: There is no tenderness to palpation over the chest wall or over ribs  Abdomen: Abdomen is soft, nontender, nondistended. There is no firm or pulsatile masses, no rebound, rigidity or guarding. Musculoskeletal: 5 out of 5 strength in all 4 extremities. No focal muscle deficits are appreciated  Back: No midline or bony tenderness. No CVAT. Neuro: Motor intact, sensory intact, level of consciousness is normal.  Dermatology: Skin is warm and dry  Psych: Mentation is grossly normal,cognition is grossly normal. Affect is appropriate. DIAGNOSTIC RESULTS     EKG: All EKG's are interpreted by the[facility] Phys[facility] who either signs or Co-signs this chart in the absence of a cardiologist.      RADIOLOGY:   Non-plain film images such as CT, Ultrasound and MRI are read by the radiologist. Plain radiographic images are visualized and preliminarily interpreted by the emergency physician with the below findings:      [x]Radiologist's Report Reviewed:  XR CHEST PORTABLE   Final Result      1. Mild right basilar airspace disease favoring atelectasis in this patient with low lung volumes. Correlate clinically.             ED BEDSIDE ULTRASOUND:   Performed by ED Physician - none    LABS:  Labs Reviewed   BLOOD GAS, ARTERIAL - Abnormal; Notable for the following components:       Result Value    HCO3, Arterial 27.5 (*)     Base Excess, Arterial 3.1 (*)     All other components within normal limits   CBC WITH AUTO DIFFERENTIAL - Abnormal; Notable for the following components:    WBC 12.5 (*)     RBC 4.25 (*)     Hemoglobin 12.3 (*)     Hematocrit 37.0 (*)     Neutrophils Absolute 10.0 (*)     Lymphocytes Absolute 1.1 (*)     Monocytes Absolute 1.3 (*)     All other components within normal limits   COMPREHENSIVE METABOLIC PANEL - Abnormal; Notable for the following components:    Sodium 129 (*)     Chloride 91 (*)     Glucose 197 (*)     All other components within normal limits   URINALYSIS WITH REFLEX TO CULTURE - Abnormal; Notable for the following components:    Glucose, Ur >=1000 (*)     Ketones, Urine TRACE (*)     All other components within normal limits   COVID-19, RAPID   CULTURE, BLOOD 1   CULTURE, BLOOD 2   BRAIN NATRIURETIC PEPTIDE   TROPONIN       I have reviewed and interpreted all ofthe currently available lab results from this visit (if applicable):  Results for orders placed or performed during the hospital encounter of 07/03/22   COVID-19, Rapid    Specimen: Nasopharyngeal Swab   Result Value Ref Range    SARS-CoV-2, NAAT Not Detected Not Detected   Blood Gas, Arterial   Result Value Ref Range    pH, Arterial 7.440 7.350 - 7.450    pCO2, Arterial 41.5 35.0 - 45.0 mmHg    pO2, Arterial 95.1 80.0 - 100.0 mmHg    HCO3, Arterial 27.5 (H) 22.0 - 26.0 mmol/L    Base Excess, Arterial 3.1 (H) -3.0 - 3.0 mmol/L    O2 Sat, Arterial 97.5 >92 %    TCO2, Arterial 28.8 24.0 - 30.0 mmol/L    O2 Therapy Unknown     FIO2 0.28 Not Established %   Brain Natriuretic Peptide   Result Value Ref Range    Pro- 0 - 1,800 pg/mL   CBC with Auto Differential   Result Value Ref Range    WBC 12.5 (H) 4.0 - 11.0 K/uL    RBC 4.25 (L) 4.50 - 6.00 M/uL    Hemoglobin 12.3 (L) 13.0 - 18.0 g/dL    Hematocrit 37.0 (L) 40.0 - 54.0 %    MCV 87.1 80.0 - 100.0 fL    MCH 28.9 27.0 - 32.0 pg    MCHC 33.2 31.0 - 35.0 g/dL    RDW 13.9 11.0 - 16.0 %    Platelets 917 225 - 081 K/uL    MPV 8.0 6.0 - 10.0 fL    Neutrophils % 80.3 %    Immature Granulocytes % 0.3 0.0 - 5.0 %    Lymphocytes % 8.9 %    Monocytes % 10.1 %    Eosinophils % 0.1 %    Basophils % 0.3 %    Neutrophils Absolute 10.0 (H) 2.0 - 7.5 K/uL    Immature Granulocytes # 0.0 K/uL    Lymphocytes Absolute 1.1 (L) 1.5 - 4.0 K/uL    Monocytes Absolute 1.3 (H) 0.2 - 0.8 K/uL    Eosinophils Absolute 0.0 0.0 - 0.4 K/uL    Basophils Absolute 0.0 0.0 - 0.1 K/uL   Comprehensive Metabolic Panel   Result Value Ref Range    Sodium 129 (L) 136 - 145 mmol/L    Potassium 4.5 3.4 - 5.1 mmol/L    Chloride 91 (L) 98 - 107 mmol/L    CO2 27 20 - 30 mmol/L    Anion Gap 11 3 - 16    Glucose 197 (H) 74 - 106 mg/dL    BUN 18 6 - 20 mg/dL    CREATININE 1.1 0.4 - 1.2 mg/dL    GFR Non-African American >59 >59    GFR African American >59 >59    Calcium 9.5 8.5 - 10.5 mg/dL    Total Protein 7.0 6.4 - 8.3 g/dL    Albumin 4.2 3.4 - 4.8 g/dL    Albumin/Globulin Ratio 1.5 0.8 - 2.0    Total Bilirubin 0.3 0.3 - 1.2 mg/dL    Alkaline Phosphatase 74 25 - 100 U/L    ALT 17 4 - 36 U/L    AST 9 8 - 33 U/L    Globulin 2.8 Not Established g/dL   Troponin   Result Value Ref Range    Troponin <0.30 <0.30 ng/mL   Urinalysis with Reflex to Culture    Specimen: Urine   Result Value Ref Range    Color, UA Yellow Straw/Yellow    Clarity, UA Clear Clear    Glucose, Ur >=1000 (A) Negative mg/dL    Bilirubin Urine Negative Negative    Ketones, Urine TRACE (A) Negative mg/dL    Specific Gravity, UA 1.010 1.005 - 1.030    Blood, Urine Negative Negative    pH, UA 5.0 5.0 - 8.0    Protein, UA Negative Negative mg/dL    Urobilinogen, Urine 0.2 <2.0 E.U./dL    Nitrite, Urine Negative Negative    Leukocyte Esterase, Urine Negative Negative    Microscopic Examination Not Indicated     Urine Type NotGiven     Urine Reflex to Culture Not Indicated         All other labs were within normal range or not returned as of this dictation. EMERGENCY DEPARTMENT COURSE and DIFFERENTIAL DIAGNOSIS/MDM:   Vitals:  Vitals:    07/03/22 2030 07/03/22 2037 07/03/22 2245 07/03/22 2348   BP: (!) 119/53   115/68   Pulse: 93   70   Temp:  (!) 102.9 °F (39.4 °C) 99.8 °F (37.7 °C) 99 °F (37.2 °C)   TempSrc:  Axillary     SpO2: 97%   94%       When the patient first presented he was 84% on room air. He was started on 4 L nasal cannula with sats of 97%. CBC with WBC of 12.5  CMP with sodium of 129  BNP normal  Troponin negative    ABG 7.44/41. 5/9 5.1/27.5/97.5% but patient was on nebulizer treatment. Chest x-ray showed right basilar pneumonia. This correlates clinically on exam.    2223  Dr. Lainey Pacheco was paged for admission. CONSULTS:  None    PROCEDURES:  Procedures    CRITICAL CARE TIME    Total Critical Care time was 0 minutes, excluding separately reportable procedures. There was a high probability of clinically significant/life threatening deterioration in the patient's condition which required my urgent intervention. FINAL IMPRESSION      1. Pneumonia of right lower lobe due to infectious organism    2. Hypoxia    3. COPD exacerbation ([facility] [address]          DISPOSITION/PLAN   DISPOSITION Admitted 07/03/2022 10:50:29 PM      PATIENT REFERRED TO:  No follow-up provider specified. DISCHARGE MEDICATIONS:  Current Discharge Medication List          Comment: Please note this report has been produced using speech recognition software and may contain errorsrelated to that system including errors in grammar, punctuation, and spelling, as well as words and phrases that may be inappropriate. If there are any questions or concerns please feel free to contact the dictating providerfor clarification.     Tien Medina MD  Attending Emergency Physician                Turner Nazario MD  07/04/22 0001

## 2022-07-04 NOTE — FLOWSHEET NOTE
07/04/22 0838   Assessment   Charting Type Shift assessment   Psychosocial   Psychosocial (WDL) WDL   Neurological   Neuro (WDL) X   Level of Consciousness Responds to voice (1)   [place] Coma Scale   Eye Opening 3   Best Verbal Response 5   Best Motor Response 6   [place] Coma Scale Score 14   HEENT (Head, Ears, Eyes, Nose, & Throat)   HEENT (WDL) X   Right Eye Glasses   Left Eye Glasses   Teeth Missing teeth   Respiratory   Respiratory (WDL) X   Respiratory Pattern Regular   Respiratory Depth Normal   Respiratory Quality/Effort Unlabored   Chest Assessment Chest expansion symmetrical   Breath Sounds   Right Upper Lobe Coarse Crackles   Right Middle Lobe Coarse Crackles   Right Lower Lobe Coarse Crackles   Left Upper Lobe Coarse Crackles   Left Lower Lobe Coarse Crackles   Cough/Sputum   Cough None   Cardiac   Cardiac (WDL) X   Cardiac Regularity Regular   Cardiac Rhythm Sinus andria   Cardiac Monitor   Telemetry Box Number [id number]   Alarm Audible Yes   Telemetry Monitor Alarm Parameters    Gastrointestinal   Abdominal (WDL) X   Abdomen Inspection Rounded; Taut   RUQ Bowel Sounds Active   LUQ Bowel Sounds Active   RLQ Bowel Sounds Active   LLQ Bowel Sounds Active   Tenderness Nontender   Genitourinary   Genitourinary (WDL) X   Urine Frequency   Urine Frequency Yes   Peripheral Vascular   Peripheral Vascular (WDL) X   Edema Right lower extremity; Left lower extremity;Perineal   RLE Edema +1;Pitting   LLE Edema +1;Pitting   Perineal Edema +4  (scrotal edema)   Skin Integumentary    Skin Integumentary (WDL) WDL   Pt sleeping in bed. Pt responds to voice but immediately falls back asleep. Provider notified. ABG obtained and reviewed. RT assessed and found pt has apneic episodes while sleeping.  Pt placed on BiPAP

## 2022-07-04 NOTE — H&P
History and Physical    Patient:  Rogerio Mitchell    CHIEF COMPLAINT:    SOA    HISTORY OF PRESENT ILLNESS:   The patient is a 64 y.o. male with PMF of anxiety, B12 deficiency, prostate cancer, chronic constipation, DDD, depression, diabetes mellitus type 2, GERD, COPD, hyperlipidemia, hypertension and chronic back pain who presented to the emergency department with family with complaints of shortness of breath and productive cough. Patient states his symptoms started 1 week prior and have continued to worsen. Home pulse oximetry reading prior to arrival was reportedly 74%. Patient is unaware of any fevers. Family reported patient's nephew had been sick 3 days prior with wheezing and rattling in his chest.  Patient also endorsed nausea, vomiting and diarrhea for the past 2 days. Patient had been using his home inhalers and nebulizer machine without any significant improvement. At baseline, he does not require oxygen. He quit smoking 5 months ago. Patient denied any known exposure to COVID-positive persons. Vitals upon arrival: Blood pressure 119/58, pulse 93, temperature 102.9, respirations 16, oxygen saturation 97% on room air. Labs: WBC 12.5, hemoglobin 12.3, hematocrit 37.0, platelets 728, sodium 129, potassium 4.5, chloride 91, CO2 27, BUN 18, creatinine 1.1, anion gap 11, glucose 197, alkaline phos 74, ALT 17, AST 9, bilirubin 0.3, proBNP 434, troponin less than 0.3. COVID-19 not detected. UA unremarkable. ABG: pH 7.440, PCO2 41.5, PCO2 95.1, oxygen saturation 97.5. Chest x-ray revealed mild right basilar airspace disease favoring atelectasis in this patient with low lung volumes. Correlate clinically. In the emergency department, patient was given Tylenol, 1 hour-long albuterol nebulizer treatment, Rocephin, azithromycin, dexamethasone. He was then admitted for further care.     Past Medical History:      Diagnosis Date    Anxiety     B12 deficiency     Cancer [facility])     Prostate  Chronic constipation     DDD (degenerative disc disease), lumbar     Depression     Diabetes mellitus (HCC)     GERD (gastroesophageal reflux disease)     Hyperlipidemia     Hypertension     Lower back pain    COPD    Past Surgical History:      Procedure Laterality Date    CHOLECYSTECTOMY      PROSTATECTOMY  2018       Medications Prior to Admission:    Prior to Admission medications    Medication Sig Start Date End Date Taking?  Authorizing Provider   QUEtiapine (SEROQUEL) 100 MG tablet Take 1 tablet by mouth nightly 2/17/21   ISAURA Gould   risperiDONE (RISPERDAL) 0.25 MG tablet Take 1 tablet by mouth 2 times daily 2/17/21   ISAURA Gould   propranolol (INDERAL) 20 MG tablet Take 1 tablet by mouth 2 times daily 2/17/21   Violeta ISAURA Velázquez   diclofenac sodium (VOLTAREN) 1 % GEL Apply 2 g topically 2 times daily 2/17/21 3/19/21  ISAURA Gould   vitamin D (ERGOCALCIFEROL) 1.25 MG (87201 UT) CAPS capsule Take 1 capsule by mouth once a week 2/22/21   ISAURA Gould   divalproex (DEPAKOTE ER) 250 MG extended release tablet Take 250 mg by mouth nightly    Historical Provider, MD   sucralfate (CARAFATE) 1 GM tablet Take 1 g by mouth 3 times daily    Historical Provider, MD   metFORMIN (GLUCOPHAGE) 1000 MG tablet Take 1,000 mg by mouth 2 times daily (with meals)    Historical Provider, MD   SITagliptin (JANUVIA) 100 MG tablet Take 100 mg by mouth daily    Historical Provider, MD   pantoprazole sodium (PROTONIX) 40 MG PACK packet Take 40 mg by mouth every morning (before breakfast)    Historical Provider, MD   glipiZIDE (GLUCOTROL) 10 MG tablet Take 10 mg by mouth 2 times daily (before meals)    Historical Provider, MD   empagliflozin (JARDIANCE) 10 MG tablet Take 10 mg by mouth daily    Historical Provider, MD   aspirin 81 MG tablet Take 81 mg by mouth daily    Historical Provider, MD   fenofibrate 160 MG tablet Take 160 mg by mouth daily    Historical Provider, MD   sertraline (ZOLOFT) 100 MG tablet Take 100 mg by mouth daily    Historical Provider, MD   atorvastatin (LIPITOR) 80 MG tablet Take 80 mg by mouth daily    Historical Provider, MD       Allergies:  Patient has no known allergies. Social History:   TOBACCO:   reports that he quit smoking about 5 months ago. His smoking use included cigarettes. He has a 50.00 pack-year smoking history. His smokeless tobacco use includes chew. ETOH:   reports no history of alcohol use. OCCUPATION:  None    Family History:   History reviewed. No pertinent family history. Review of system  Constitutional:  Denies fever or chills   Eyes:  Denies eye pain or redness  HENT:  Denies nasal congestion or sore throat   Respiratory: Positive for productive cough and shortness of breath  Cardiovascular:  Denies chest pain or edema   GI:  Denies abdominal pain, bloody stools. Positive for nausea, vomiting and diarrhea. :  Denies dysuria or frequency  Musculoskeletal:  Denies acute neck pain or body aches  Integument:  Denies rash or itching  Neurologic:  Denies headache, dizziness, numbness, tingling or unilateral weakness  Psychiatric:  Denies acute depression or acute anxiety      Vital Signs  Temp: 97.3 °F (36.3 °C)  Heart Rate: 71  Resp: 18  BP: 133/62  SpO2: 98 %  O2 Device: Nasal cannula  O2 Flow Rate (L/min): 2 L/min    vital signs reviewed in electronic chart. Physical exam  Constitutional:  Well developed, well nourished, male laying in bed in no acute distress. Sleeping but arousable. Eyes:  no scleral icterus, conjunctiva normal   HENT:  Atraumatic, external ears normal, nose normal, oropharynx moist, no pharyngeal exudates. Neck- supple, no JVD, no lymphadenopathy  Respiratory:  No respiratory distress, no wheezing, rales or rhonchi detected. Rhonchi noted.   Cardiovascular:  Normal rate, normal rhythm, no murmurs, no gallops, no rubs, no edema   GI:  Soft, nondistended, normal bowel sounds, nontender, no voluntary guarding  Musculoskeletal:  No cyanosis or obvious acute deformity. Moving all extremities   Integument:  Warm and dry. Lymphatic:  No cervical or axillary lymphadenopathy noted   Neurologic:  Alert & oriented x 3, no apparent focal deficits noted   Psychiatric:  Speech and behavior appropriate         Lab Results   Component Value Date     (L) 07/04/2022    K 4.6 07/04/2022    CL 93 (L) 07/04/2022    CO2 24 07/04/2022    BUN 24 (H) 07/04/2022    CREATININE 1.3 (H) 07/04/2022    GLUCOSE 216 (H) 07/04/2022    CALCIUM 9.7 07/04/2022    PROT 7.0 07/03/2022    LABALBU 4.2 07/03/2022    BILITOT 0.3 07/03/2022    ALKPHOS 74 07/03/2022    AST 9 07/03/2022    ALT 17 07/03/2022    LABGLOM 56 (L) 07/04/2022    GFRAA >59 07/04/2022    AGRATIO 1.5 07/03/2022    GLOB 2.8 07/03/2022           Lab Results   Component Value Date    WBC 15.4 (H) 07/04/2022    HGB 11.9 (L) 07/04/2022    HCT 37.2 (L) 07/04/2022    MCV 90.3 07/04/2022     07/04/2022       PA/lat CXR:   CT HEAD WO CONTRAST   Final Result      No intracranial hemorrhage or mass effect. XR CHEST PORTABLE   Final Result      1. Mild right basilar airspace disease favoring atelectasis in this patient with low lung volumes. Correlate clinically.             Assessment and Plan     Active Hospital Problems    Diagnosis Date Noted    Sepsis ([facility] [address] [A41.9]  -Met criteria on admission with fever, leukocytosis and pneumonia  -BCx 7/3 pending  -received IVF bolus, rocephin/azithromycin in ER  -continue gentle hydration with IVFs, rocephin/azithromycin   07/04/2022    COPD exacerbation ([facility] [address] [J44.1]  -received decadron in ER upon arrival  -pt not wheezing on exam today; will hold on further steroids for now  -continue DuoNebs  -Add budesonide nebs and Mucinex 7/4  -Encourage IS and ambulation  -pt quit smoking 5 months ago; counseled on continued cessation   07/04/2022    Pneumonia [J18.9]  -Chest x-ray 7/3: Mild right basilar airspace disease favoring atelectasis  -Patient with fever of 102.9 and WBC 12.5 upon arrival to ER on 7/3. Also with complaints of SOA and productive cough. Treating for pneumonia with Rocephin, azithromycin, Mucinex, DuoNebs, budesonide nebs. -encourage IS and ambulation  -O2 sats maintained on RA   02/07/2021    Obesity [E66.9]  -BMI 26.50  -complicates all aspects of care  -counseled on diet and exercise after recovery   02/07/2021    Type 2 diabetes mellitus ([facility] [address] [E11.9]  -A1c 8.5 on 2/7/21; will repeat with AM labs  -home regimen: Jardiance, metformin, Januvia, glipizide  -Home regimen resumed. Also covering with SSI.   02/07/2021    Hypertension [I10]  -continue Inderal   02/07/2021    Acute encephalopathy [G93.40]  -Patient with episode of acute encephalopathy in a.m. of 7/4. ABG obtained and unremarkable. Patient noted to have apneic breathing per respiratory and was placed on BiPAP. CT head obtained and negative. Patient reevaluated earlier this afternoon with improvement of mental status. It appears patient was overmedicated. Hold home Seroquel and risperidone.  -Resolved 02/06/2021     Patient was seen and examined by Dr. Gustabo Shin and plan of care reviewed. Treatment plan was formulated collaboratively.       ISAURA Diggs certifies per CMS regulation for 42 .15(a), that the patient may reasonably be expected to be discharged or transferred to a hospital within 96 hours after admission to [address] [place] [address]    Electronically signed by ISAURA Diggs on 7/4/2022 at 11:36 AM

## 2022-07-05 LAB
A/G RATIO: 1.3 (ref 0.8–2)
ALBUMIN SERPL-MCNC: 3.9 G/DL (ref 3.4–4.8)
ALP BLD-CCNC: 62 U/L (ref 25–100)
ALT SERPL-CCNC: 14 U/L (ref 4–36)
AMMONIA: <17 MCG/DL (ref 27–102)
ANION GAP SERPL CALCULATED.3IONS-SCNC: 9 MMOL/L (ref 3–16)
AST SERPL-CCNC: 10 U/L (ref 8–33)
BILIRUB SERPL-MCNC: <0.2 MG/DL (ref 0.3–1.2)
BUN BLDV-MCNC: 24 MG/DL (ref 6–20)
CALCIUM SERPL-MCNC: 8.9 MG/DL (ref 8.5–10.5)
CHLORIDE BLD-SCNC: 101 MMOL/L (ref 98–107)
CO2: 28 MMOL/L (ref 20–30)
CREAT SERPL-MCNC: 0.9 MG/DL (ref 0.4–1.2)
FOLATE: 5.88 NG/ML
GFR AFRICAN AMERICAN: >59
GFR NON-AFRICAN AMERICAN: >59
GLOBULIN: 3.1 G/DL
GLUCOSE BLD-MCNC: 103 MG/DL (ref 74–106)
GLUCOSE BLD-MCNC: 103 MG/DL (ref 74–106)
GLUCOSE BLD-MCNC: 116 MG/DL (ref 74–106)
GLUCOSE BLD-MCNC: 145 MG/DL (ref 74–106)
GLUCOSE BLD-MCNC: 84 MG/DL (ref 74–106)
HBA1C MFR BLD: 6.1 %
HCT VFR BLD CALC: 35.6 % (ref 40–54)
HEMOGLOBIN: 11.2 G/DL (ref 13–18)
MCH RBC QN AUTO: 28.8 PG (ref 27–32)
MCHC RBC AUTO-ENTMCNC: 31.5 G/DL (ref 31–35)
MCV RBC AUTO: 91.5 FL (ref 80–100)
PDW BLD-RTO: 14.2 % (ref 11–16)
PERFORMED ON: ABNORMAL
PERFORMED ON: ABNORMAL
PERFORMED ON: NORMAL
PERFORMED ON: NORMAL
PLATELET # BLD: 329 K/UL (ref 150–400)
PMV BLD AUTO: 8.2 FL (ref 6–10)
POTASSIUM REFLEX MAGNESIUM: 4.4 MMOL/L (ref 3.4–5.1)
RBC # BLD: 3.89 M/UL (ref 4.5–6)
SODIUM BLD-SCNC: 138 MMOL/L (ref 136–145)
TOTAL PROTEIN: 7 G/DL (ref 6.4–8.3)
TSH SERPL DL<=0.05 MIU/L-ACNC: 2.45 UIU/ML (ref 0.27–4.2)
VITAMIN B-12: 1769 PG/ML (ref 211–911)
WBC # BLD: 13.1 K/UL (ref 4–11)

## 2022-07-05 PROCEDURE — 36415 COLL VENOUS BLD VENIPUNCTURE: CPT

## 2022-07-05 PROCEDURE — 2580000003 HC RX 258: Performed by: INTERNAL MEDICINE

## 2022-07-05 PROCEDURE — 6370000000 HC RX 637 (ALT 250 FOR IP): Performed by: INTERNAL MEDICINE

## 2022-07-05 PROCEDURE — 83036 HEMOGLOBIN GLYCOSYLATED A1C: CPT

## 2022-07-05 PROCEDURE — 82607 VITAMIN B-12: CPT

## 2022-07-05 PROCEDURE — 82140 ASSAY OF AMMONIA: CPT

## 2022-07-05 PROCEDURE — 2580000003 HC RX 258: Performed by: PHYSICIAN ASSISTANT

## 2022-07-05 PROCEDURE — 94640 AIRWAY INHALATION TREATMENT: CPT

## 2022-07-05 PROCEDURE — 82746 ASSAY OF FOLIC ACID SERUM: CPT

## 2022-07-05 PROCEDURE — 99232 SBSQ HOSP IP/OBS MODERATE 35: CPT | Performed by: INTERNAL MEDICINE

## 2022-07-05 PROCEDURE — 2700000000 HC OXYGEN THERAPY PER DAY

## 2022-07-05 PROCEDURE — 97165 OT EVAL LOW COMPLEX 30 MIN: CPT

## 2022-07-05 PROCEDURE — 94660 CPAP INITIATION&MGMT: CPT

## 2022-07-05 PROCEDURE — 6370000000 HC RX 637 (ALT 250 FOR IP): Performed by: PHYSICIAN ASSISTANT

## 2022-07-05 PROCEDURE — 6360000002 HC RX W HCPCS: Performed by: INTERNAL MEDICINE

## 2022-07-05 PROCEDURE — 97161 PT EVAL LOW COMPLEX 20 MIN: CPT

## 2022-07-05 PROCEDURE — 84443 ASSAY THYROID STIM HORMONE: CPT

## 2022-07-05 PROCEDURE — 6360000002 HC RX W HCPCS: Performed by: PHYSICIAN ASSISTANT

## 2022-07-05 PROCEDURE — 80053 COMPREHEN METABOLIC PANEL: CPT

## 2022-07-05 PROCEDURE — 85027 COMPLETE CBC AUTOMATED: CPT

## 2022-07-05 PROCEDURE — 94761 N-INVAS EAR/PLS OXIMETRY MLT: CPT

## 2022-07-05 PROCEDURE — 1200000000 HC SEMI PRIVATE

## 2022-07-05 PROCEDURE — 97530 THERAPEUTIC ACTIVITIES: CPT

## 2022-07-05 PROCEDURE — 97116 GAIT TRAINING THERAPY: CPT

## 2022-07-05 RX ORDER — UMECLIDINIUM BROMIDE AND VILANTEROL TRIFENATATE 62.5; 25 UG/1; UG/1
1 POWDER RESPIRATORY (INHALATION) DAILY
Status: ON HOLD | COMMUNITY
End: 2022-07-06 | Stop reason: HOSPADM

## 2022-07-05 RX ORDER — FUROSEMIDE 20 MG/1
20 TABLET ORAL DAILY
Status: DISCONTINUED | OUTPATIENT
Start: 2022-07-05 | End: 2022-07-06 | Stop reason: HOSPADM

## 2022-07-05 RX ORDER — FUROSEMIDE 10 MG/ML
40 INJECTION INTRAMUSCULAR; INTRAVENOUS ONCE
Status: COMPLETED | OUTPATIENT
Start: 2022-07-05 | End: 2022-07-05

## 2022-07-05 RX ORDER — FUROSEMIDE 20 MG/1
20 TABLET ORAL DAILY
COMMUNITY
End: 2022-09-08 | Stop reason: SDUPTHER

## 2022-07-05 RX ORDER — ROPINIROLE 2 MG/1
2 TABLET, FILM COATED ORAL NIGHTLY
COMMUNITY
End: 2022-09-08 | Stop reason: SDUPTHER

## 2022-07-05 RX ORDER — ZINC OXIDE AND DIMETHICONE 120; 10 MG/G; MG/G
CREAM TOPICAL 2 TIMES DAILY PRN
Status: DISCONTINUED | OUTPATIENT
Start: 2022-07-05 | End: 2022-07-06 | Stop reason: HOSPADM

## 2022-07-05 RX ORDER — ROPINIROLE 1 MG/1
2 TABLET, FILM COATED ORAL NIGHTLY
Status: DISCONTINUED | OUTPATIENT
Start: 2022-07-05 | End: 2022-07-06 | Stop reason: HOSPADM

## 2022-07-05 RX ORDER — CETIRIZINE HYDROCHLORIDE 10 MG/1
10 TABLET ORAL DAILY
Status: DISCONTINUED | OUTPATIENT
Start: 2022-07-05 | End: 2022-07-06 | Stop reason: HOSPADM

## 2022-07-05 RX ORDER — FEXOFENADINE HCL 180 MG/1
180 TABLET ORAL DAILY
COMMUNITY
End: 2022-09-08 | Stop reason: SDUPTHER

## 2022-07-05 RX ORDER — INSULIN GLARGINE 100 [IU]/ML
5 INJECTION, SOLUTION SUBCUTANEOUS NIGHTLY
Status: DISCONTINUED | OUTPATIENT
Start: 2022-07-05 | End: 2022-07-06 | Stop reason: HOSPADM

## 2022-07-05 RX ORDER — ALBUTEROL SULFATE 90 UG/1
2 AEROSOL, METERED RESPIRATORY (INHALATION) EVERY 6 HOURS PRN
COMMUNITY

## 2022-07-05 RX ORDER — INSULIN DETEMIR 100 [IU]/ML
5 INJECTION, SOLUTION SUBCUTANEOUS NIGHTLY
COMMUNITY

## 2022-07-05 RX ORDER — TIOTROPIUM BROMIDE 18 UG/1
18 CAPSULE ORAL; RESPIRATORY (INHALATION) DAILY
COMMUNITY

## 2022-07-05 RX ORDER — DEXTROSE MONOHYDRATE 50 MG/ML
100 INJECTION, SOLUTION INTRAVENOUS PRN
Status: DISCONTINUED | OUTPATIENT
Start: 2022-07-05 | End: 2022-07-06 | Stop reason: HOSPADM

## 2022-07-05 RX ADMIN — PROPRANOLOL HYDROCHLORIDE 20 MG: 20 TABLET ORAL at 21:06

## 2022-07-05 RX ADMIN — ATORVASTATIN CALCIUM 80 MG: 80 TABLET, FILM COATED ORAL at 09:14

## 2022-07-05 RX ADMIN — GLIPIZIDE 10 MG: 10 TABLET ORAL at 17:19

## 2022-07-05 RX ADMIN — SODIUM CHLORIDE: 9 INJECTION, SOLUTION INTRAVENOUS at 01:53

## 2022-07-05 RX ADMIN — GUAIFENESIN 600 MG: 600 TABLET, EXTENDED RELEASE ORAL at 21:06

## 2022-07-05 RX ADMIN — DIVALPROEX SODIUM 250 MG: 250 TABLET, EXTENDED RELEASE ORAL at 21:06

## 2022-07-05 RX ADMIN — IPRATROPIUM BROMIDE AND ALBUTEROL SULFATE 1 AMPULE: 2.5; .5 SOLUTION RESPIRATORY (INHALATION) at 05:54

## 2022-07-05 RX ADMIN — IPRATROPIUM BROMIDE AND ALBUTEROL SULFATE 1 AMPULE: 2.5; .5 SOLUTION RESPIRATORY (INHALATION) at 08:40

## 2022-07-05 RX ADMIN — GLIPIZIDE 10 MG: 10 TABLET ORAL at 06:08

## 2022-07-05 RX ADMIN — CEFTRIAXONE 1000 MG: 1 INJECTION, POWDER, FOR SOLUTION INTRAMUSCULAR; INTRAVENOUS at 16:01

## 2022-07-05 RX ADMIN — BUDESONIDE 500 MCG: 0.5 INHALANT RESPIRATORY (INHALATION) at 05:54

## 2022-07-05 RX ADMIN — FUROSEMIDE 20 MG: 20 TABLET ORAL at 15:58

## 2022-07-05 RX ADMIN — METFORMIN HYDROCHLORIDE 1000 MG: 500 TABLET, FILM COATED ORAL at 09:14

## 2022-07-05 RX ADMIN — ALOGLIPTIN 25 MG: 12.5 TABLET, FILM COATED ORAL at 09:14

## 2022-07-05 RX ADMIN — ENOXAPARIN SODIUM 40 MG: 100 INJECTION SUBCUTANEOUS at 09:13

## 2022-07-05 RX ADMIN — PROPRANOLOL HYDROCHLORIDE 20 MG: 20 TABLET ORAL at 09:15

## 2022-07-05 RX ADMIN — METFORMIN HYDROCHLORIDE 1000 MG: 500 TABLET, FILM COATED ORAL at 17:19

## 2022-07-05 RX ADMIN — INSULIN LISPRO 1 UNITS: 100 INJECTION, SOLUTION INTRAVENOUS; SUBCUTANEOUS at 17:19

## 2022-07-05 RX ADMIN — ROPINIROLE HYDROCHLORIDE 2 MG: 1 TABLET, FILM COATED ORAL at 21:06

## 2022-07-05 RX ADMIN — BUDESONIDE 500 MCG: 0.5 INHALANT RESPIRATORY (INHALATION) at 17:21

## 2022-07-05 RX ADMIN — ASPIRIN 81 MG 81 MG: 81 TABLET ORAL at 09:15

## 2022-07-05 RX ADMIN — IPRATROPIUM BROMIDE AND ALBUTEROL SULFATE 1 AMPULE: 2.5; .5 SOLUTION RESPIRATORY (INHALATION) at 12:48

## 2022-07-05 RX ADMIN — IPRATROPIUM BROMIDE AND ALBUTEROL SULFATE 1 AMPULE: 2.5; .5 SOLUTION RESPIRATORY (INHALATION) at 17:21

## 2022-07-05 RX ADMIN — GUAIFENESIN 600 MG: 600 TABLET, EXTENDED RELEASE ORAL at 09:14

## 2022-07-05 RX ADMIN — SERTRALINE HYDROCHLORIDE 100 MG: 50 TABLET ORAL at 09:14

## 2022-07-05 RX ADMIN — FUROSEMIDE 40 MG: 10 INJECTION, SOLUTION INTRAMUSCULAR; INTRAVENOUS at 15:57

## 2022-07-05 RX ADMIN — PANTOPRAZOLE SODIUM 40 MG: 40 TABLET, DELAYED RELEASE ORAL at 06:08

## 2022-07-05 RX ADMIN — CETIRIZINE HYDROCHLORIDE 10 MG: 10 TABLET, FILM COATED ORAL at 15:57

## 2022-07-05 RX ADMIN — AZITHROMYCIN MONOHYDRATE 250 MG: 250 TABLET ORAL at 09:15

## 2022-07-05 NOTE — PROGRESS NOTES
Progress Note      Subjective:   Chief complaint:   SOA    Interval History:   Patient seen and examined this morning. He reports doing well with physical therapy. Currently on 1 L nasal cannula with O2 sats 98%. At baseline, patient does not use oxygen. No acute events overnight. Patient much more awake today. Review of systems:   Constitutional:  Denies fever or chills   Eyes:  Denies eye pain or redness  HENT:  Denies nasal congestion or sore throat   Respiratory: Positive for productive cough and shortness of breath  Cardiovascular:  Denies chest pain or edema   GI:  Denies abdominal pain, bloody stools. Positive for nausea, vomiting and diarrhea. :  Denies dysuria or frequency  Musculoskeletal:  Denies acute neck pain or body aches  Integument:  Denies rash or itching  Neurologic:  Denies headache, dizziness, numbness, tingling or unilateral weakness  Psychiatric:  Denies acute depression or acute anxiety    Past medical history, surgical history, family history and social history reviewed and unchanged compared to H&P earlier this admission.     Medications:   Scheduled Meds:   aspirin  81 mg Oral Daily    atorvastatin  80 mg Oral Daily    divalproex  250 mg Oral Nightly    glipiZIDE  10 mg Oral BID AC    metFORMIN  1,000 mg Oral BID WC    pantoprazole  40 mg Oral QAM AC    propranolol  20 mg Oral BID    [Held by provider] QUEtiapine  100 mg Oral Nightly    [Held by provider] risperiDONE  0.25 mg Oral BID    sertraline  100 mg Oral Daily    alogliptin  25 mg Oral Daily    enoxaparin  40 mg SubCUTAneous Daily    cefTRIAXone (ROCEPHIN) IV  1,000 mg IntraVENous Q24H    insulin lispro  0-6 Units SubCUTAneous TID WC    insulin lispro  0-3 Units SubCUTAneous Nightly    azithromycin  250 mg Oral Daily    guaiFENesin  600 mg Oral BID    budesonide  0.5 mg Nebulization BID    ipratropium-albuterol  1 ampule Inhalation Q4H WA     Continuous Infusions:   sodium chloride 100 mL/hr at 07/05/22 0153       Objective:     Vital Signs  Temp: 97.5 °F (36.4 °C)  Heart Rate: 72  Resp: 16  BP: (!) 143/73  SpO2: 98 %  O2 Device: Nasal cannula  O2 Flow Rate (L/min): 1 L/min    Vital signs reviewed in electronic charts. Physical exam  Constitutional:  Well developed, well nourished, male laying in bed in no acute distress. Sleeping but arousable. Eyes:  no scleral icterus, conjunctiva normal   HENT:  Atraumatic, external ears normal, nose normal, oropharynx moist, no pharyngeal exudates. Neck- supple, no JVD, no lymphadenopathy  Respiratory:  No respiratory distress, no wheezing, rales or rhonchi detected. Rhonchi noted. Cardiovascular:  Normal rate, normal rhythm, no murmurs, no gallops, no rubs, no edema   GI:  Soft, nondistended, normal bowel sounds, nontender, no voluntary guarding  Musculoskeletal:  No cyanosis or obvious acute deformity. Moving all extremities   Integument:  Warm and dry. Lymphatic:  No cervical or axillary lymphadenopathy noted   Neurologic:  Alert & oriented x 3, no apparent focal deficits noted   Psychiatric:  Speech and behavior appropriate     Results:     Lab Results   Component Value Date    WBC 13.1 (H) 07/05/2022    HGB 11.2 (L) 07/05/2022    HCT 35.6 (L) 07/05/2022    MCV 91.5 07/05/2022     07/05/2022       Lab Results   Component Value Date/Time     07/05/2022 05:30 AM    K 4.4 07/05/2022 05:30 AM     07/05/2022 05:30 AM    CO2 28 07/05/2022 05:30 AM    BUN 24 07/05/2022 05:30 AM    CREATININE 0.9 07/05/2022 05:30 AM    GLUCOSE 84 07/05/2022 05:30 AM    CALCIUM 8.9 07/05/2022 05:30 AM        Assessment and Plan: Active Hospital Problems     Diagnosis Date Noted    Sepsis ([facility] [address] [A41.9]  -Met criteria on admission with fever, leukocytosis and pneumonia  -BCx 7/3 pending  -received IVF bolus, rocephin/azithromycin in ER; gently hydrated with IVFs overnight 7/4-7/5.  DC IVFs 7/5.   -continue rocephin/azithromycin    07/04/2022    COPD exacerbation

## 2022-07-05 NOTE — PLAN OF CARE
Problem: Discharge Planning  Goal: Discharge to home or other facility with appropriate resources  7/5/2022 0203 by Claudine Lopez RN  Outcome: Progressing  7/4/2022 1716 by Graciela Brenner RN  Outcome: Progressing     Problem: Safety - Adult  Goal: Free from fall injury  7/5/2022 0203 by Claudine Lopez RN  Outcome: Progressing  7/4/2022 1716 by Graciela Brenner RN  Outcome: Progressing     Problem: Skin/Tissue Integrity  Goal: Absence of new skin breakdown  Description: 1. Monitor for areas of redness and/or skin breakdown  2. Assess vascular access sites hourly  3. Every 4-6 hours minimum:  Change oxygen saturation probe site  4. Every 4-6 hours:  If on nasal continuous positive airway pressure, respiratory therapy assess nares and determine need for appliance change or resting period.   7/4/2022 1716 by Graciela Brenner RN  Outcome: Progressing

## 2022-07-05 NOTE — FLOWSHEET NOTE
07/05/22 1249 07/05/22 1258 07/05/22 1300   Oxygen Therapy   SpO2 96 % (!) 74 % 99 %   O2 Device Nasal cannula None (Room air) Nasal cannula   FiO2  28 %  --   --    O2 Flow Rate (L/min) 2 L/min  --  1 L/min   Weaned to 1 lpm. Sats in 90s. Pt going to bathroom without oxygen and desat to 74%. Given longer NC and pt instructed to keep oxygen on. Sats improved to 100%.

## 2022-07-05 NOTE — FLOWSHEET NOTE
07/05/22 0930   Assessment   Charting Type Shift assessment   Psychosocial   Psychosocial (WDL) WDL   Neurological   Neuro (WDL) X   Level of Consciousness Alert (0)   Orientation Level Oriented X4   [place] Coma Scale   Eye Opening 4   Best Verbal Response 5   Best Motor Response 6   [place] Coma Scale Score 15   HEENT (Head, Ears, Eyes, Nose, & Throat)   HEENT (WDL) X   Right Eye Glasses   Left Eye Glasses   Teeth Missing teeth   Respiratory   Respiratory (WDL) X   Respiratory Pattern Regular   Respiratory Depth Normal   Respiratory Quality/Effort Unlabored   Chest Assessment Chest expansion symmetrical   Breath Sounds   Right Upper Lobe Coarse Crackles   Right Middle Lobe Coarse Crackles   Right Lower Lobe Coarse Crackles   Left Upper Lobe Coarse Crackles   Left Lower Lobe Coarse Crackles   Cough/Sputum   Cough None   Cardiac   Cardiac (WDL) X   Cardiac Regularity Regular   Cardiac Rhythm Sinus andria   Cardiac Monitor   Telemetry Box Number [id number]   Alarm Audible Yes   Telemetry Monitor Alarm Parameters    Gastrointestinal   Abdominal (WDL) X   Abdomen Inspection Rounded; Taut   RUQ Bowel Sounds Active   LUQ Bowel Sounds Active   RLQ Bowel Sounds Active   LLQ Bowel Sounds Active   Tenderness Nontender   Genitourinary   Genitourinary (WDL) X   Urine Frequency   Urine Frequency Yes   Peripheral Vascular   Peripheral Vascular (WDL) X   Edema Right lower extremity; Left lower extremity;Perineal   Skin Integumentary    Skin Integumentary (WDL) WDL

## 2022-07-05 NOTE — PLAN OF CARE
Problem: Discharge Planning  Goal: Discharge to home or other facility with appropriate resources  7/5/2022 1524 by Soumya Ferrari RN  Outcome: Progressing  7/5/2022 0203 by Sharmin Colin RN  Outcome: Progressing

## 2022-07-05 NOTE — PROGRESS NOTES
Physical Therapy  Facility/Department: [facility]  Physical Therapy Initial Assessment    Name: Víctor Morales  : 1960  MRN: [id number]  Date of Service: 2022    Discharge Recommendations:  Continue to assess pending progress          Patient Diagnosis(es): The primary encounter diagnosis was Pneumonia of right lower lobe due to infectious organism. Diagnoses of Hypoxia and COPD exacerbation ([facility] [address] were also pertinent to this visit. Past Medical History:  has a past medical history of Anxiety, B12 deficiency, Cancer ([facility] [address] Chronic constipation, DDD (degenerative disc disease), lumbar, Depression, Diabetes mellitus ([facility] [address] GERD (gastroesophageal reflux disease), Hyperlipidemia, Hypertension, and Lower back pain. Past Surgical History:  has a past surgical history that includes Cholecystectomy and Prostatectomy (2018). Assessment   Body Structures, Functions, Activity Limitations Requiring Skilled Therapeutic Intervention: Decreased functional mobility ; Decreased safe awareness;Decreased endurance;Decreased balance  Assessment: PT eval completed. Patient able to perform bed mobility with mod I. Sit to stand, transfers and able to ambulate 200' with CGA HHA. Trial straight cane for improve balance, safety and endurance. He presents with deficits in functional mobility but is expected to benefit from continued skilled PT intervention to improve his mobility status prior to D/C.   Therapy Prognosis: Good  Decision Making: Low Complexity  Requires PT Follow-Up: Yes  Activity Tolerance  Activity Tolerance: Patient limited by endurance     Plan   Plan  Plan: 3-5 times per week  Current Treatment Recommendations: Balance training,Functional mobility training,Transfer training,Gait training,Endurance training,Therapeutic activities,Home exercise program,Safety education & training,Patient/Caregiver education & training  Safety Devices  Type of Devices: Call light within reach,Left in bed Restrictions  Restrictions/Precautions  Restrictions/Precautions: Fall Risk,General Precautions  Required Braces or Orthoses?: No     Subjective   Pain: No c/o pain. General  Chart Reviewed: Yes  Patient assessed for rehabilitation services?: Yes  Family / Caregiver Present: No  Referring Practitioner: Niyah Roy MD  Follows Commands: Within Functional Limits         Social/Functional History  Social/Functional History  Lives With: Family (Niece and her spouse)  Type of Home: House  Home Layout: One level  Home Access: Ramped entrance  Bathroom Shower/Tub: Tub/Shower unit  Bathroom Toilet: Handicap height  Bathroom Equipment: Shower chair,Grab bars around toilet,3-in-1 commode  Bathroom Accessibility: Walker accessible  Home Equipment: Cane,Walker, rolling,Walker, standard  Has the patient had two or more falls in the past year or any fall with injury in the past year?: No  Receives Help From: Family  ADL Assistance: Independent  Homemaking Assistance: Needs assistance (family assists but pt able to help as needed)  Ambulation Assistance: Independent  Transfer Assistance: Independent  Active : No  Vision/Hearing  Vision  Vision: Impaired  Vision Exceptions: Wears glasses at all times  Hearing  Hearing: Within functional limits    Cognition   Orientation  Orientation Level: Disoriented to time;Oriented to person;Oriented to place  Cognition  Overall Cognitive Status: WFL  Cognition Comment: decreased safety awareness     Objective   Heart Rate: 80  Heart Rate Source: Monitor  BP: (!) 143/73  BP Location: Left upper arm  Patient Position: Supine  MAP (Calculated): 96.33  Resp: 16  SpO2: 99 %  O2 Device: Nasal cannula  Comment: 1 LPM     Observation/Palpation  Observation: 1L 02, lying in bed, NAD, pleasant and cooperative  Gross Assessment  AROM: Within functional limits  PROM: Within functional limits  Strength:  Within functional limits  Coordination: Within functional limits Gait  Distance (ft): 200 Feet  Bed mobility  Rolling to Left: Modified independent  Supine to Sit: Modified independent  Scooting: Modified independent  Transfers  Sit to Stand: Contact guard assistance  Stand to sit: Contact guard assistance  Bed to Chair: Contact guard assistance  Comment: HHA for safety/balance/endurance; trial straight cane  Ambulation  Surface: level tile  Device: Hand-Held Assist  Assistance: Contact guard assistance  Gait Deviations: Slow Brenda; Increased NAZARIO  Distance: 200'  Comments: trial straight cane     Balance  Posture: Good  Sitting - Static: Good  Sitting - Dynamic: Good  Standing - Static: Good  Standing - Dynamic: Good;-                Goals  Long Term Goals  Time Frame for Long term goals : 14 days  Long term goal 1: Patient will perform all bed mobility with independence. Long term goal 2: Patient will perform sit to stand and transfers with cane and mod I.  Long term goal 3: Patient will ambulate 400' with cane and SBA. Long term goal 4: Patient will demonstrate independence with HEP.               Therapy Time   Individual Concurrent Group Co-treatment   Time In 6663         Time Out 1105         Minutes [address], PT

## 2022-07-05 NOTE — CARE COORDINATION
Lives With: Family (Niece and her spouse)  Type of Home: House  Home Layout: One level  Home Access: Ramped entrance  Bathroom Shower/Tub: Tub/Shower unit  Bathroom Toilet: Handicap height  Bathroom Equipment: Shower chair,Grab bars around toilet,3-in-1 commode  Bathroom Accessibility: Walker accessible  Home Equipment: Cane,Walker, rolling,Walker, standard  Has the patient had two or more falls in the past year or any fall with injury in the past year?: No  Receives Help From: Family  ADL Assistance: Independent  Homemaking Assistance: Needs assistance (family assists but pt able to help as needed)  Ambulation Assistance: Independent  Transfer Assistance: Independent  Active : No    Lives with family. Has SC, BSC, grab bars in RR, cane, RW, standard walker. No DC needs noted at this time.

## 2022-07-05 NOTE — PROGRESS NOTES
Medication Reconciliation completed with fill history from DR ABDOULAYE LINDER [facility]. Patient had not filled Fenofibrate in the past 6 months but he stated his doctor recently left and he will be seeing a new provider on Friday when he should get a new prescription.  Spoke with ISAURA Vergara    Added: Fexofenodine 180mg               Ropinirole 2 mg               Furosemide 20mg               Levemir Flextouch 100U               Proair 90mcg               Anoro  Ellipta 62.5-25 mcg               Spriiva 18mcg caps handihaler

## 2022-07-05 NOTE — PROGRESS NOTES
Occupational Therapy  Facility/Department: [facility] MED SURG  Occupational Therapy Initial Assessment    Name: Safia Reid  : 1960  MRN: [id number]  Date of Service: 2022    Discharge Recommendations:  Home with Home health OT          Patient Diagnosis(es): The primary encounter diagnosis was Pneumonia of right lower lobe due to infectious organism. Diagnoses of Hypoxia and COPD exacerbation ([facility] [address] were also pertinent to this visit. Past Medical History:  has a past medical history of Anxiety, B12 deficiency, Cancer ([address] Chronic constipation, DDD (degenerative disc disease), lumbar, Depression, Diabetes mellitus ([address] GERD (gastroesophageal reflux disease), Hyperlipidemia, Hypertension, and Lower back pain. Past Surgical History:  has a past surgical history that includes Cholecystectomy and Prostatectomy (2018). Assessment   Performance deficits / Impairments: Decreased endurance;Decreased balance  Assessment: This 64year old male was referred to OT services upon recent admission. Pt presents on 1L upon entry but states he is on room air at baseline. Pt come to sit at EOB without difficulty. Pt come to stand at EOB with SBA. Pt ambulated in hallway mildly unsteady with HHA. Pt educated in benefit of using AD such as cane to assist with stability. Pt demo improved stability with HHA. Pt ambulated on room air maintaining 02 sats >90%. Pt returned to room and transferred to seated position at EOB. Pt demo ability to don LB clothing seated at EOB without difficulty. Pt will benefit from skilled OT services to focus on balance and safety awareness with ADL's during IP stay.   Prognosis: Good  Decision Making: Low Complexity  REQUIRES OT FOLLOW-UP: Yes  Activity Tolerance  Activity Tolerance: Patient Tolerated treatment well        Plan   Plan  Times per Week: 3-5  Times per Day: Daily  Plan Weeks: 1  Current Treatment Recommendations: Strengthening,Balance training,Functional mobility training,Endurance training,Self-Care / ADL,Patient/Caregiver education & training     Restrictions  Restrictions/Precautions  Restrictions/Precautions: Fall Risk,General Precautions  Required Braces or Orthoses?: No    Subjective   General  Patient assessed for rehabilitation services?: Yes  Family / Caregiver Present: No  Referring Practitioner: ISAURA Back  Diagnosis: Pneumonia  Subjective  Subjective: I want to go home soon. Social/Functional History  Social/Functional History  Lives With: Family (Niece and her spouse)  Type of Home: House  Home Layout: One level  Home Access: Ramped entrance  Bathroom Shower/Tub: Tub/Shower unit  Bathroom Toilet: Handicap height  Bathroom Equipment: Shower chair,Grab bars around toilet,3-in-1 commode  Bathroom Accessibility: Walker accessible  Home Equipment: Cane,Walker, rolling,Walker, standard  Has the patient had two or more falls in the past year or any fall with injury in the past year?: No  Receives Help From: Family  ADL Assistance: Independent  Homemaking Assistance: Needs assistance (family assists but pt able to help as needed)  Ambulation Assistance: Independent  Transfer Assistance: Independent  Active : No       Objective   Heart Rate: 80  Heart Rate Source: Monitor  BP: (!) 143/73  BP Location: Left upper arm  Patient Position: Supine  MAP (Calculated): 96.33  Resp: 16  SpO2: 99 %  O2 Device: Nasal cannula  Comment: 1 LPM          Observation/Palpation  Observation: 1L 02, lying in bed, NAD, pleasant and cooperative  Safety Devices  Type of Devices: Call light within reach; Left in bed  Bed Mobility Training  Bed Mobility Training: No  Balance  Sitting: Intact  Standing: High guard  Transfer Training  Transfer Training: No  Gait  Overall Level of Assistance:  (HHA)  Distance (ft): 200 Feet  Assistive Device:  (HHA)     AROM: Within functional limits  PROM: Within functional limits  Strength:  Within functional limits  Coordination: Within functional limits  Tone: Normal  Sensation: Intact  ADL  LE Dressing: Contact guard assistance  Toileting Skilled Clinical Factors: declined need to toilet upon request     Activity Tolerance  Activity Tolerance: Patient limited by endurance  Bed mobility  Rolling to Left: Modified independent  Supine to Sit: Modified independent  Scooting: Modified independent  Transfers  Stand Pivot Transfers: Contact guard assistance  Sit to stand: Stand by assistance     Cognition  Overall Cognitive Status: WFL  Cognition Comment: decreased safety awareness  Orientation  Orientation Level: Disoriented to time;Oriented to person;Oriented to place                  Education Given To: Patient  Education Provided: Precautions; Fall Prevention Strategies  Education Method: Verbal  Barriers to Learning: Cognition  Education Outcome: Verbalized understanding;Continued education needed  LUE AROM (degrees)  LUE AROM : WFL  RUE AROM (degrees)  RUE AROM : WFL    Goals  Short Term Goals  Time Frame for Short term goals: 1 week  Short Term Goal 1: pt to complete dressing with MOD I. Short Term Goal 2: Pt to complete toileting with MOD I. Short Term Goal 3: Pt to complete bathing with MOD I. Short Term Goal 4: Pt to tolerate x10 minutes of activity to increase functional activity tolerance. Short Term Goal 5: Pt to complete ADL transfers using cane as needed for improved stability with MOD I. Therapy Time   Individual Concurrent Group Co-treatment   Time In 2702         Time Out 1105         Minutes 24              This note serves as a DC summary in the event of pt discharge.      Elisa Kimbrough OTR/L

## 2022-07-06 VITALS
RESPIRATION RATE: 16 BRPM | OXYGEN SATURATION: 94 % | BODY MASS INDEX: 45.24 KG/M2 | DIASTOLIC BLOOD PRESSURE: 71 MMHG | WEIGHT: 255.38 LBS | TEMPERATURE: 98.6 F | HEART RATE: 80 BPM | SYSTOLIC BLOOD PRESSURE: 151 MMHG

## 2022-07-06 LAB
A/G RATIO: 1.3 (ref 0.8–2)
ALBUMIN SERPL-MCNC: 3.6 G/DL (ref 3.4–4.8)
ALP BLD-CCNC: 66 U/L (ref 25–100)
ALT SERPL-CCNC: 15 U/L (ref 4–36)
ANION GAP SERPL CALCULATED.3IONS-SCNC: 9 MMOL/L (ref 3–16)
AST SERPL-CCNC: 10 U/L (ref 8–33)
BILIRUB SERPL-MCNC: 0.3 MG/DL (ref 0.3–1.2)
BUN BLDV-MCNC: 19 MG/DL (ref 6–20)
CALCIUM SERPL-MCNC: 8.7 MG/DL (ref 8.5–10.5)
CHLORIDE BLD-SCNC: 99 MMOL/L (ref 98–107)
CO2: 27 MMOL/L (ref 20–30)
CREAT SERPL-MCNC: 0.6 MG/DL (ref 0.4–1.2)
GFR AFRICAN AMERICAN: >59
GFR NON-AFRICAN AMERICAN: >59
GLOBULIN: 2.8 G/DL
GLUCOSE BLD-MCNC: 109 MG/DL (ref 74–106)
GLUCOSE BLD-MCNC: 148 MG/DL (ref 74–106)
GLUCOSE BLD-MCNC: 89 MG/DL (ref 74–106)
HCT VFR BLD CALC: 32.5 % (ref 40–54)
HEMOGLOBIN: 10.3 G/DL (ref 13–18)
MCH RBC QN AUTO: 28.9 PG (ref 27–32)
MCHC RBC AUTO-ENTMCNC: 31.7 G/DL (ref 31–35)
MCV RBC AUTO: 91 FL (ref 80–100)
PDW BLD-RTO: 14.2 % (ref 11–16)
PERFORMED ON: ABNORMAL
PERFORMED ON: ABNORMAL
PLATELET # BLD: 331 K/UL (ref 150–400)
PMV BLD AUTO: 8.3 FL (ref 6–10)
POTASSIUM REFLEX MAGNESIUM: 3.9 MMOL/L (ref 3.4–5.1)
RBC # BLD: 3.57 M/UL (ref 4.5–6)
SODIUM BLD-SCNC: 135 MMOL/L (ref 136–145)
TOTAL PROTEIN: 6.4 G/DL (ref 6.4–8.3)
WBC # BLD: 10.9 K/UL (ref 4–11)

## 2022-07-06 PROCEDURE — 94761 N-INVAS EAR/PLS OXIMETRY MLT: CPT

## 2022-07-06 PROCEDURE — 94669 MECHANICAL CHEST WALL OSCILL: CPT

## 2022-07-06 PROCEDURE — 94618 PULMONARY STRESS TESTING: CPT

## 2022-07-06 PROCEDURE — 94660 CPAP INITIATION&MGMT: CPT

## 2022-07-06 PROCEDURE — 2580000003 HC RX 258: Performed by: INTERNAL MEDICINE

## 2022-07-06 PROCEDURE — 99238 HOSP IP/OBS DSCHRG MGMT 30/<: CPT | Performed by: INTERNAL MEDICINE

## 2022-07-06 PROCEDURE — 80053 COMPREHEN METABOLIC PANEL: CPT

## 2022-07-06 PROCEDURE — 6360000002 HC RX W HCPCS: Performed by: INTERNAL MEDICINE

## 2022-07-06 PROCEDURE — 6370000000 HC RX 637 (ALT 250 FOR IP): Performed by: INTERNAL MEDICINE

## 2022-07-06 PROCEDURE — 85027 COMPLETE CBC AUTOMATED: CPT

## 2022-07-06 PROCEDURE — 2700000000 HC OXYGEN THERAPY PER DAY

## 2022-07-06 PROCEDURE — 36415 COLL VENOUS BLD VENIPUNCTURE: CPT

## 2022-07-06 PROCEDURE — 94640 AIRWAY INHALATION TREATMENT: CPT

## 2022-07-06 PROCEDURE — 6370000000 HC RX 637 (ALT 250 FOR IP): Performed by: PHYSICIAN ASSISTANT

## 2022-07-06 RX ORDER — IPRATROPIUM BROMIDE AND ALBUTEROL SULFATE 2.5; .5 MG/3ML; MG/3ML
3 SOLUTION RESPIRATORY (INHALATION)
Qty: 360 ML | Refills: 0 | Status: SHIPPED | OUTPATIENT
Start: 2022-07-06

## 2022-07-06 RX ORDER — CEFDINIR 300 MG/1
300 CAPSULE ORAL 2 TIMES DAILY
Qty: 14 CAPSULE | Refills: 0 | Status: ON HOLD | OUTPATIENT
Start: 2022-07-06 | End: 2022-07-11 | Stop reason: HOSPADM

## 2022-07-06 RX ORDER — GUAIFENESIN 600 MG/1
600 TABLET, EXTENDED RELEASE ORAL 2 TIMES DAILY
Qty: 14 TABLET | Refills: 0 | Status: ON HOLD | OUTPATIENT
Start: 2022-07-06 | End: 2022-07-11 | Stop reason: SDUPTHER

## 2022-07-06 RX ADMIN — ALOGLIPTIN 25 MG: 12.5 TABLET, FILM COATED ORAL at 08:39

## 2022-07-06 RX ADMIN — INSULIN LISPRO 1 UNITS: 100 INJECTION, SOLUTION INTRAVENOUS; SUBCUTANEOUS at 11:41

## 2022-07-06 RX ADMIN — ACETAMINOPHEN 650 MG: 325 TABLET, FILM COATED ORAL at 04:01

## 2022-07-06 RX ADMIN — BUDESONIDE 500 MCG: 0.5 INHALANT RESPIRATORY (INHALATION) at 05:01

## 2022-07-06 RX ADMIN — CETIRIZINE HYDROCHLORIDE 10 MG: 10 TABLET, FILM COATED ORAL at 08:39

## 2022-07-06 RX ADMIN — ASPIRIN 81 MG 81 MG: 81 TABLET ORAL at 08:39

## 2022-07-06 RX ADMIN — ATORVASTATIN CALCIUM 80 MG: 80 TABLET, FILM COATED ORAL at 08:39

## 2022-07-06 RX ADMIN — IPRATROPIUM BROMIDE AND ALBUTEROL SULFATE 1 AMPULE: 2.5; .5 SOLUTION RESPIRATORY (INHALATION) at 05:01

## 2022-07-06 RX ADMIN — AZITHROMYCIN MONOHYDRATE 250 MG: 250 TABLET ORAL at 08:40

## 2022-07-06 RX ADMIN — METFORMIN HYDROCHLORIDE 1000 MG: 500 TABLET, FILM COATED ORAL at 08:39

## 2022-07-06 RX ADMIN — ENOXAPARIN SODIUM 40 MG: 100 INJECTION SUBCUTANEOUS at 08:39

## 2022-07-06 RX ADMIN — FUROSEMIDE 20 MG: 20 TABLET ORAL at 08:39

## 2022-07-06 RX ADMIN — SERTRALINE HYDROCHLORIDE 100 MG: 50 TABLET ORAL at 08:39

## 2022-07-06 RX ADMIN — GUAIFENESIN 600 MG: 600 TABLET, EXTENDED RELEASE ORAL at 08:39

## 2022-07-06 RX ADMIN — CEFTRIAXONE 1000 MG: 1 INJECTION, POWDER, FOR SOLUTION INTRAMUSCULAR; INTRAVENOUS at 10:37

## 2022-07-06 RX ADMIN — PANTOPRAZOLE SODIUM 40 MG: 40 TABLET, DELAYED RELEASE ORAL at 05:55

## 2022-07-06 RX ADMIN — GLIPIZIDE 10 MG: 10 TABLET ORAL at 05:55

## 2022-07-06 RX ADMIN — PROPRANOLOL HYDROCHLORIDE 20 MG: 20 TABLET ORAL at 08:39

## 2022-07-06 RX ADMIN — IPRATROPIUM BROMIDE AND ALBUTEROL SULFATE 1 AMPULE: 2.5; .5 SOLUTION RESPIRATORY (INHALATION) at 09:12

## 2022-07-06 ASSESSMENT — PAIN SCALES - GENERAL: PAINLEVEL_OUTOF10: 5

## 2022-07-06 ASSESSMENT — PAIN DESCRIPTION - DESCRIPTORS: DESCRIPTORS: ACHING;CRAMPING

## 2022-07-06 ASSESSMENT — PAIN DESCRIPTION - ORIENTATION: ORIENTATION: LOWER

## 2022-07-06 ASSESSMENT — PAIN DESCRIPTION - LOCATION: LOCATION: BACK

## 2022-07-06 NOTE — PLAN OF CARE
Problem: Discharge Planning  Goal: Discharge to home or other facility with appropriate resources  7/6/2022 0206 by Raj Moran RN  Outcome: Progressing  7/5/2022 1524 by Monica Ruth RN  Outcome: Progressing     Problem: Safety - Adult  Goal: Free from fall injury  Outcome: Progressing

## 2022-07-06 NOTE — PROGRESS NOTES
6 MINUTE WALK TEST    Exercise type: In [address] without assistance     O2 sat RA/O2LPM RR EN HR BP   Rest 93 RA 16 0 78    1 min 94 RA  1 78    2 min 93 RA 18 2 81    3 min 92 RA  2 85    4 min 92 RA 23 3 88    5 min 91 RA  3 92    6 min 92 RA 24 4 98    Recovery 93 RA 16 0 80      Distance walked: 352 Feet    Breath sounds/patterns:  Diminished    Comments:Patient did not meet criteria for Home Supplemental Oxygen.

## 2022-07-06 NOTE — PLAN OF CARE
Problem: Discharge Planning  Goal: Discharge to home or other facility with appropriate resources  7/6/2022 1022 by Rayray Capps RN  Outcome: Progressing  7/6/2022 0206 by Aris Loredo RN  Outcome: Progressing     Problem: Safety - Adult  Goal: Free from fall injury  7/6/2022 1022 by Rayray Capps RN  Outcome: Progressing  7/6/2022 0206 by Aris Loreod RN  Outcome: Progressing     Problem: ABCDS Injury Assessment  Goal: Absence of physical injury  Outcome: Progressing     Problem: Skin/Tissue Integrity  Goal: Absence of new skin breakdown  Description: 1. Monitor for areas of redness and/or skin breakdown  2. Assess vascular access sites hourly  3. Every 4-6 hours minimum:  Change oxygen saturation probe site  4. Every 4-6 hours:  If on nasal continuous positive airway pressure, respiratory therapy assess nares and determine need for appliance change or resting period.   Outcome: Progressing     Problem: Chronic Conditions and Co-morbidities  Goal: Patient's chronic conditions and co-morbidity symptoms are monitored and maintained or improved  Outcome: Progressing

## 2022-07-06 NOTE — PLAN OF CARE
Problem: Discharge Planning  Goal: Discharge to home or other facility with appropriate resources  7/6/2022 1104 by Efren Vivar RN  Outcome: Completed  7/6/2022 1022 by Efren Vivar RN  Outcome: Progressing  7/6/2022 0206 by Mario Alberto Aguilar RN  Outcome: Progressing     Problem: Safety - Adult  Goal: Free from fall injury  7/6/2022 1104 by Efren Vivar RN  Outcome: Completed  7/6/2022 1022 by Efren Vivar RN  Outcome: Progressing  7/6/2022 0206 by Mario Alberto Aguilar RN  Outcome: Progressing     Problem: ABCDS Injury Assessment  Goal: Absence of physical injury  7/6/2022 1104 by Efren Vivar RN  Outcome: Completed  7/6/2022 1022 by Efren Vivar RN  Outcome: Progressing     Problem: Skin/Tissue Integrity  Goal: Absence of new skin breakdown  Description: 1. Monitor for areas of redness and/or skin breakdown  2. Assess vascular access sites hourly  3. Every 4-6 hours minimum:  Change oxygen saturation probe site  4. Every 4-6 hours:  If on nasal continuous positive airway pressure, respiratory therapy assess nares and determine need for appliance change or resting period.   7/6/2022 1104 by Efren Vivar RN  Outcome: Completed  7/6/2022 1022 by Efren Vivar RN  Outcome: Progressing     Problem: Chronic Conditions and Co-morbidities  Goal: Patient's chronic conditions and co-morbidity symptoms are monitored and maintained or improved  7/6/2022 1104 by Efren Vivar RN  Outcome: Completed  7/6/2022 1022 by Efren Vivar RN  Outcome: Progressing

## 2022-07-06 NOTE — PROGRESS NOTES
Peripheral IV and telemetry discontinued. Discharged home with nephew. Received home nebulizer machine. Pt and family member verbalized understanding and are aware to  new medications at preferred pharmacy.

## 2022-07-06 NOTE — DISCHARGE SUMMARY
Discharge Summary      Patient ID: Stefanie Garcia      Patient's PCP: Romeo Vega    Admit Date: 7/3/2022     Discharge Date:   7/6/2022    Admitting Provider: Venkat Michael MD    Discharging Provider: ISAURA Ortega     Reason for this admission:   Sepsis due to Pneumonia   COPD exacerbation    Discharge Diagnoses: Active Hospital Problems    Diagnosis Date Noted    Sepsis ([address] [A41.9] 07/04/2022     Priority: Medium    COPD exacerbation ([address] [J44.1] 07/04/2022     Priority: Medium    Pneumonia [J18.9] 02/07/2021    Obesity [E66.9] 02/07/2021    Type 2 diabetes mellitus ([facility] [E11.9] 02/07/2021    Hypertension [I10] 02/07/2021    Acute encephalopathy [G93.40] 02/06/2021       Procedures:  CT HEAD WO CONTRAST   Final Result      No intracranial hemorrhage or mass effect. XR CHEST PORTABLE   Final Result      1. Mild right basilar airspace disease favoring atelectasis in this patient with low lung volumes. Correlate clinically. Consults:   PT OT  RT  Case Management  Pharmacy      Briefly:    64 y.o. male with PMF of anxiety, B12 deficiency, prostate cancer, chronic constipation, DDD, depression, diabetes mellitus type 2, GERD, COPD, hyperlipidemia, hypertension and chronic back pain admitted for sepsis due to pneumonia and COPD exacerbation. Pt treated with rocephin, azithromycin, bronchodilators, mucolytics and diuresis with improvement. He required 2L NC earlier in hospital stay but with treatment O2 demand improved and sats are now stable on RA. Desaturation screening day of discharge without desaturation. Pt will be transitioned to oral regimen and discharged home today. Full details of hospital stay are outlined below. Hospital Course:       Active Hospital Problems     Diagnosis Date Noted    Sepsis ([facility] [address] [A41.9]  -Met criteria on admission with fever, leukocytosis and pneumonia  -BCx 7/3 with no growth  -received IVF bolus, rocephin/azithromycin in ER; gently hydrated with IVFs overnight 7/4-7/5. DC IVFs 7/5.   -continue rocephin/azithromycin; transition to [facility] on discharge    07/04/2022    COPD exacerbation ([facility] [J44.1]  -received decadron in ER upon arrival  -pt not wheezing on exam; holding on further steroids unless indicated  -received DuoNebs, budesonide nebs and Mucinex while IP; will arrange for home nebulizer and provide rx for nebs on discharge  -Encourage IS and ambulation  -pt quit smoking 5 months ago; counseled on continued cessation    07/04/2022    Pneumonia [J18.9]  -Chest x-ray 7/3: Mild right basilar airspace disease favoring atelectasis  -Patient with fever of 102.9 and WBC 12.5 upon arrival to ER on 7/3.  Also with complaints of SOA and productive cough. Treated pneumonia with Rocephin, azithromycin, Mucinex, DuoNebs, budesonide nebs while IP. Transition to omnicef, mucinex and continue duonebs on discharge.  -encourage IS and ambulation  -required O2 earlier in hospital stay; now on RA. Desaturation screening without desaturation on day of discharge.  -trial of lasix 40mg IV x 1 7/5 02/07/2021    Obesity [E66.9]  -BMI 92.79  -complicates all aspects of care  -counseled on diet and exercise after recovery    02/07/2021    Type 2 diabetes mellitus ([facility] [E11.9]  -A1c 6.1 7/5/22  -home regimen: Jardiance, metformin, Januvia, glipizide  -Home regimen resumed on admission.  Also covered with SSI.    02/07/2021    Hypertension [I10]  -continue Inderal    02/07/2021    Acute encephalopathy [G93.40]  -Patient with episode of acute encephalopathy in a.m. of 7/4.  ABG obtained and unremarkable.  Patient noted to have apneic breathing per respiratory and was placed on BiPAP 7/4.  CT head obtained and negative.  Patient reevaluated in the afternoon on 7/4. MS had returned to baseline.  Suspect patient was overmedicated.  Held home Seroquel and risperidone 7/4.  -Resolved            Disposition: home    Discharged Condition: Stable    Vital Signs  Temp: 98.6 °F (37 °C)  Heart Rate: 80  Resp: 16  BP: (!) 151/71  SpO2: 94 %  O2 Device: None (Room air)  O2 Flow Rate (L/min): 1 L/min    Vital signs reviewed in electronic chart. Physical exam  Constitutional:  Well developed, well nourished, male laying in bed in no acute distress. Sleeping but arousable. Eyes:  no scleral icterus, conjunctiva normal   HENT:  Atraumatic, external ears normal, nose normal, oropharynx moist, no pharyngeal exudates. Neck- supple, no JVD, no lymphadenopathy  Respiratory:  No respiratory distress, no wheezing, rales or rhonchi detected. Cardiovascular:  Normal rate, normal rhythm, no murmurs, no gallops, no rubs, no edema   GI:  Soft, nondistended, normal bowel sounds, nontender, no voluntary guarding  Musculoskeletal:  No cyanosis or obvious acute deformity. Moving all extremities   Integument:  Warm and dry. Lymphatic:  No cervical or axillary lymphadenopathy noted   Neurologic:  Alert & oriented x 3, no apparent focal deficits noted   Psychiatric:  Speech and behavior appropriate       Activity: activity as tolerated  Diet: diabetic diet  Follow Up: Primary Care Physician in 2 weeks    Labs:  For convenience and continuity at follow-up the following most recent labs are provided:    CBC:   Lab Results   Component Value Date/Time    WBC 10.9 07/06/2022 04:27 AM    HGB 10.3 07/06/2022 04:27 AM    HCT 32.5 07/06/2022 04:27 AM     07/06/2022 04:27 AM       RENAL:   Lab Results   Component Value Date/Time     07/06/2022 04:27 AM    K 3.9 07/06/2022 04:27 AM    CL 99 07/06/2022 04:27 AM    CO2 27 07/06/2022 04:27 AM    BUN 19 07/06/2022 04:27 AM    CREATININE 0.6 07/06/2022 04:27 AM         Discharge Medications:     Current Discharge Medication List           Details   ipratropium-albuterol (DUONEB) 0.5-2.5 (3) MG/3ML SOLN nebulizer solution Inhale 3 mLs into the lungs every 4 hours (while awake)  Qty: 360 mL, Refills: 0      cefdinir (OMNICEF) 300 MG capsule Take 1 capsule by mouth 2 times daily for 7 days  Qty: 14 capsule, Refills: 0      guaiFENesin (MUCINEX) 600 MG extended release tablet Take 1 tablet by mouth 2 times daily  Qty: 14 tablet, Refills: 0              Details   fexofenadine (ALLEGRA) 180 MG tablet Take 180 mg by mouth daily      rOPINIRole (REQUIP) 2 MG tablet Take 2 mg by mouth nightly       furosemide (LASIX) 20 MG tablet Take 20 mg by mouth daily      insulin detemir (LEVEMIR FLEXTOUCH) 100 UNIT/ML injection pen Inject 5 Units into the skin nightly      albuterol sulfate HFA (PROAIR HFA) 108 (90 Base) MCG/ACT inhaler Inhale 2 puffs into the lungs every 6 hours as needed for Wheezing      tiotropium (SPIRIVA HANDIHALER) 18 MCG inhalation capsule Inhale 18 mcg into the lungs daily      cyanocobalamin 1000 MCG tablet Take 1,000 mcg by mouth daily      propranolol (INDERAL) 20 MG tablet Take 1 tablet by mouth 2 times daily  Qty: 90 tablet, Refills: 3      diclofenac sodium (VOLTAREN) 1 % GEL Apply 2 g topically 2 times daily  Qty: 50 g, Refills: 0      vitamin D (ERGOCALCIFEROL) 1.25 MG (96050 UT) CAPS capsule Take 1 capsule by mouth once a week  Qty: 5 capsule, Refills: 0      divalproex (DEPAKOTE ER) 250 MG extended release tablet Take 250 mg by mouth nightly      sucralfate (CARAFATE) 1 GM tablet Take 1 g by mouth 3 times daily      metFORMIN (GLUCOPHAGE) 1000 MG tablet Take 1,000 mg by mouth 2 times daily (with meals)      SITagliptin (JANUVIA) 100 MG tablet Take 100 mg by mouth daily      pantoprazole sodium (PROTONIX) 40 MG PACK packet Take 40 mg by mouth every morning (before breakfast)      glipiZIDE (GLUCOTROL) 10 MG tablet Take 10 mg by mouth 2 times daily (before meals)      empagliflozin (JARDIANCE) 10 MG tablet Take 10 mg by mouth daily      aspirin 81 MG tablet Take 81 mg by mouth daily      fenofibrate 160 MG tablet Take 160 mg by mouth daily As of 7/5/2022 could not find fill history but patient said he should be getting a new prescription on Friday. Left on list per [facility] -Shelbie Menjivar      sertraline (ZOLOFT) 100 MG tablet Take 100 mg by mouth daily      atorvastatin (LIPITOR) 80 MG tablet Take 80 mg by mouth daily              Patient was seen and examined by Dr. Vishal Menjivar and plan of care reviewed. Treatment plan was formulated collaboratively. Signed:  Electronically signed by ISAURA Dobbs on 7/6/2022 at 10:45 AM       Thank you Flores Abdi for the opportunity to be involved in this patient's care. If you have any questions or concerns please feel free to contact me at [phone].

## 2022-07-06 NOTE — ACP (ADVANCE CARE PLANNING)
Advance Care Planning     General Advance Care Planning (ACP) Conversation    Date of Conversation: 7/6/2022  Conducted with: Patient with Decision Making Capacity    Healthcare Decision Maker:    Primary Decision Maker: [place] - Niece/Nephew - [phone]    Primary Decision Maker: Randy Jimenez - [phone]  Click here to complete [address] including selection of the Healthcare Decision Maker Relationship (ie \"Primary\"). Today we documented Decision Maker(s) consistent with Legal Next of Kin hierarchy.     Content/Action Overview:  Has NO ACP documents/care preferences - information provided, considering goals and options  Reviewed DNR/DNI and patient elects Full Code (Attempt Resuscitation)  artificial nutrition, ventilation preferences and resuscitation preferences      Length of Voluntary ACP Conversation in minutes:  <16 minutes (Non-Billable)    Yahaira Parson

## 2022-07-06 NOTE — FLOWSHEET NOTE
07/06/22 1018   Assessment   Charting Type Shift assessment   Psychosocial   Psychosocial (WDL) WDL   Neurological   Neuro (WDL) X   Level of Consciousness Alert (0)   Orientation Level Oriented X4   [place] Coma Scale   Eye Opening 4   Best Verbal Response 5   Best Motor Response 6   [place] Coma Scale Score 15   HEENT (Head, Ears, Eyes, Nose, & Throat)   HEENT (WDL) X   Right Eye Glasses   Left Eye Glasses   Teeth Missing teeth   Respiratory   Respiratory (WDL) X   Respiratory Interventions Cough & deep breathe   Respiratory Pattern Regular   Respiratory Depth Normal   Respiratory Quality/Effort Unlabored   Chest Assessment Chest expansion symmetrical   L Breath Sounds Diminished   R Breath Sounds Diminished   Breath Sounds   Right Upper Lobe Diminished   Right Middle Lobe Diminished   Right Lower Lobe Diminished   Left Upper Lobe Diminished   Left Lower Lobe Diminished   Cough/Sputum   Cough None   Cardiac   Cardiac (WDL) X   Cardiac Regularity Regular   Cardiac Rhythm Sinus andria   Cardiac Monitor   Telemetry Box Number [id number]   Alarm Audible Yes   Telemetry Monitor Alarm Parameters    Gastrointestinal   Abdominal (WDL) X   Abdomen Inspection Rounded; Taut   RUQ Bowel Sounds Active   LUQ Bowel Sounds Active   RLQ Bowel Sounds Active   LLQ Bowel Sounds Active   Tenderness Nontender   Genitourinary   Genitourinary (WDL) X   Urine Frequency   Urine Frequency Yes   Urine Assessment   Urinary Status Incontinent   Urinary Incontinence Present   Genitalia   Male Genitalia Enlarged scrotum  (redness)   Peripheral Vascular   Peripheral Vascular (WDL) X   Edema Right lower extremity; Left lower extremity;Perineal   Skin Integumentary    Skin Integumentary (WDL) WDL

## 2022-07-06 NOTE — CARE COORDINATION
The Plan for Transition of Care is related to the following treatment goals: home with nebulizer    The Patient and/or patient representative was provided with a choice of provider and agrees with the discharge plan. [x] Yes [] No    Orders for home nebulizer sent to local DME company ([employer]). Will be delivered prior to DC.  No home O2 needs noted at this time per RT

## 2022-07-08 ENCOUNTER — APPOINTMENT (OUTPATIENT)
Dept: GENERAL RADIOLOGY | Facility: HOSPITAL | Age: 62
DRG: 177 | End: 2022-07-08
Payer: MEDICAID

## 2022-07-08 ENCOUNTER — APPOINTMENT (OUTPATIENT)
Dept: CT IMAGING | Facility: HOSPITAL | Age: 62
DRG: 177 | End: 2022-07-08
Payer: MEDICAID

## 2022-07-08 ENCOUNTER — HOSPITAL ENCOUNTER (INPATIENT)
Facility: HOSPITAL | Age: 62
LOS: 3 days | Discharge: HOME | DRG: 177 | End: 2022-07-11
Attending: EMERGENCY MEDICINE | Admitting: INTERNAL MEDICINE
Payer: MEDICAID

## 2022-07-08 DIAGNOSIS — R41.82 ALTERED MENTAL STATUS, UNSPECIFIED ALTERED MENTAL STATUS TYPE: Primary | ICD-10-CM

## 2022-07-08 DIAGNOSIS — G47.33 OSA (OBSTRUCTIVE SLEEP APNEA): ICD-10-CM

## 2022-07-08 DIAGNOSIS — J18.9 PNEUMONIA OF BOTH LUNGS DUE TO INFECTIOUS ORGANISM, UNSPECIFIED PART OF LUNG: ICD-10-CM

## 2022-07-08 DIAGNOSIS — J44.1 COPD EXACERBATION (HCC): ICD-10-CM

## 2022-07-08 LAB
A/G RATIO: 1.1 (ref 0.8–2)
ALBUMIN SERPL-MCNC: 3.6 G/DL (ref 3.4–4.8)
ALP BLD-CCNC: 103 U/L (ref 25–100)
ALT SERPL-CCNC: 38 U/L (ref 4–36)
AMPHETAMINE SCREEN, URINE: ABNORMAL
ANION GAP SERPL CALCULATED.3IONS-SCNC: 13 MMOL/L (ref 3–16)
AST SERPL-CCNC: 20 U/L (ref 8–33)
BARBITURATE SCREEN URINE: ABNORMAL
BASE EXCESS ARTERIAL: -1.2 MMOL/L (ref -3–3)
BASOPHILS ABSOLUTE: 0 K/UL (ref 0–0.1)
BASOPHILS RELATIVE PERCENT: 0.2 %
BENZODIAZEPINE SCREEN, URINE: POSITIVE
BILIRUB SERPL-MCNC: <0.2 MG/DL (ref 0.3–1.2)
BILIRUBIN URINE: NEGATIVE
BLOOD CULTURE, ROUTINE: NORMAL
BLOOD, URINE: NEGATIVE
BUN BLDV-MCNC: 22 MG/DL (ref 6–20)
BUPRENORPHINE QUAL, URINE: ABNORMAL
CALCIUM SERPL-MCNC: 10.1 MG/DL (ref 8.5–10.5)
CANNABINOID SCREEN URINE: ABNORMAL
CHLORIDE BLD-SCNC: 98 MMOL/L (ref 98–107)
CLARITY: CLEAR
CO2: 24 MMOL/L (ref 20–30)
COCAINE METABOLITE SCREEN URINE: ABNORMAL
COLOR: YELLOW
CREAT SERPL-MCNC: 1.1 MG/DL (ref 0.4–1.2)
CULTURE, BLOOD 2: NORMAL
EOSINOPHILS ABSOLUTE: 0.1 K/UL (ref 0–0.4)
EOSINOPHILS RELATIVE PERCENT: 0.5 %
EPITHELIAL CELLS, UA: NORMAL /HPF (ref 0–5)
FIO2: 0.21 %
GFR AFRICAN AMERICAN: >59
GFR NON-AFRICAN AMERICAN: >59
GLOBULIN: 3.3 G/DL
GLUCOSE BLD-MCNC: 188 MG/DL (ref 74–106)
GLUCOSE BLD-MCNC: 198 MG/DL (ref 74–106)
GLUCOSE BLD-MCNC: 81 MG/DL (ref 74–106)
GLUCOSE URINE: >=1000 MG/DL
HCO3 ARTERIAL: 24.8 MMOL/L (ref 22–26)
HCT VFR BLD CALC: 38.4 % (ref 40–54)
HEMOGLOBIN: 12.4 G/DL (ref 13–18)
IMMATURE GRANULOCYTES #: 0 K/UL
IMMATURE GRANULOCYTES %: 0.4 % (ref 0–5)
KETONES, URINE: NEGATIVE MG/DL
LEUKOCYTE ESTERASE, URINE: ABNORMAL
LYMPHOCYTES ABSOLUTE: 0.8 K/UL (ref 1.5–4)
LYMPHOCYTES RELATIVE PERCENT: 7.4 %
Lab: ABNORMAL
MCH RBC QN AUTO: 29 PG (ref 27–32)
MCHC RBC AUTO-ENTMCNC: 32.3 G/DL (ref 31–35)
MCV RBC AUTO: 89.7 FL (ref 80–100)
METHADONE SCREEN, URINE: ABNORMAL
METHAMPHETAMINE, URINE: ABNORMAL
MICROSCOPIC EXAMINATION: YES
MONOCYTES ABSOLUTE: 0.8 K/UL (ref 0.2–0.8)
MONOCYTES RELATIVE PERCENT: 7.1 %
NEUTROPHILS ABSOLUTE: 9 K/UL (ref 2–7.5)
NEUTROPHILS RELATIVE PERCENT: 84.4 %
NITRITE, URINE: NEGATIVE
O2 SAT, ARTERIAL: 83.8 %
O2 THERAPY: ABNORMAL
OPIATE SCREEN URINE: POSITIVE
PCO2 ARTERIAL: 46.6 MMHG (ref 35–45)
PDW BLD-RTO: 13.7 % (ref 11–16)
PERFORMED ON: ABNORMAL
PERFORMED ON: NORMAL
PH ARTERIAL: 7.34 (ref 7.35–7.45)
PH UA: 6 (ref 5–8)
PHENCYCLIDINE SCREEN URINE: ABNORMAL
PLATELET # BLD: 285 K/UL (ref 150–400)
PMV BLD AUTO: 8.1 FL (ref 6–10)
PO2 ARTERIAL: 54.2 MMHG (ref 80–100)
POTASSIUM REFLEX MAGNESIUM: 4.6 MMOL/L (ref 3.4–5.1)
PROPOXYPHENE SCREEN, URINE: ABNORMAL
PROTEIN UA: NEGATIVE MG/DL
RBC # BLD: 4.28 M/UL (ref 4.5–6)
RBC UA: NORMAL /HPF (ref 0–4)
SARS-COV-2, NAAT: NOT DETECTED
SODIUM BLD-SCNC: 135 MMOL/L (ref 136–145)
SPECIFIC GRAVITY UA: 1.01 (ref 1–1.03)
TCO2 ARTERIAL: 26.2 MMOL/L (ref 24–30)
TOTAL PROTEIN: 6.9 G/DL (ref 6.4–8.3)
TRICYCLIC, URINE: POSITIVE
UR OXYCODONE RAPID SCREEN: ABNORMAL
URINE REFLEX TO CULTURE: ABNORMAL
URINE TYPE: ABNORMAL
UROBILINOGEN, URINE: 0.2 E.U./DL
WBC # BLD: 10.6 K/UL (ref 4–11)
WBC UA: NORMAL /HPF (ref 0–5)

## 2022-07-08 PROCEDURE — 6370000000 HC RX 637 (ALT 250 FOR IP): Performed by: PHYSICIAN ASSISTANT

## 2022-07-08 PROCEDURE — 87040 BLOOD CULTURE FOR BACTERIA: CPT

## 2022-07-08 PROCEDURE — 94640 AIRWAY INHALATION TREATMENT: CPT

## 2022-07-08 PROCEDURE — 70450 CT HEAD/BRAIN W/O DYE: CPT

## 2022-07-08 PROCEDURE — 6360000002 HC RX W HCPCS: Performed by: EMERGENCY MEDICINE

## 2022-07-08 PROCEDURE — 6360000002 HC RX W HCPCS: Performed by: PHYSICIAN ASSISTANT

## 2022-07-08 PROCEDURE — 81001 URINALYSIS AUTO W/SCOPE: CPT

## 2022-07-08 PROCEDURE — 87635 SARS-COV-2 COVID-19 AMP PRB: CPT

## 2022-07-08 PROCEDURE — 96376 TX/PRO/DX INJ SAME DRUG ADON: CPT

## 2022-07-08 PROCEDURE — 2580000003 HC RX 258: Performed by: EMERGENCY MEDICINE

## 2022-07-08 PROCEDURE — 85025 COMPLETE CBC W/AUTO DIFF WBC: CPT

## 2022-07-08 PROCEDURE — 36600 WITHDRAWAL OF ARTERIAL BLOOD: CPT

## 2022-07-08 PROCEDURE — 82803 BLOOD GASES ANY COMBINATION: CPT

## 2022-07-08 PROCEDURE — 71045 X-RAY EXAM CHEST 1 VIEW: CPT

## 2022-07-08 PROCEDURE — 99285 EMERGENCY DEPT VISIT HI MDM: CPT

## 2022-07-08 PROCEDURE — 94660 CPAP INITIATION&MGMT: CPT

## 2022-07-08 PROCEDURE — 80307 DRUG TEST PRSMV CHEM ANLYZR: CPT

## 2022-07-08 PROCEDURE — 2580000003 HC RX 258: Performed by: PHYSICIAN ASSISTANT

## 2022-07-08 PROCEDURE — 93005 ELECTROCARDIOGRAM TRACING: CPT

## 2022-07-08 PROCEDURE — 1200000000 HC SEMI PRIVATE

## 2022-07-08 PROCEDURE — 96374 THER/PROPH/DIAG INJ IV PUSH: CPT

## 2022-07-08 PROCEDURE — 80053 COMPREHEN METABOLIC PANEL: CPT

## 2022-07-08 PROCEDURE — 36415 COLL VENOUS BLD VENIPUNCTURE: CPT

## 2022-07-08 RX ORDER — ERGOCALCIFEROL 1.25 MG/1
50000 CAPSULE ORAL WEEKLY
Status: DISCONTINUED | OUTPATIENT
Start: 2022-07-08 | End: 2022-07-11 | Stop reason: HOSPADM

## 2022-07-08 RX ORDER — ATORVASTATIN CALCIUM 80 MG/1
80 TABLET, FILM COATED ORAL DAILY
Status: DISCONTINUED | OUTPATIENT
Start: 2022-07-08 | End: 2022-07-11 | Stop reason: HOSPADM

## 2022-07-08 RX ORDER — PANTOPRAZOLE SODIUM 40 MG/1
40 TABLET, DELAYED RELEASE ORAL
Status: DISCONTINUED | OUTPATIENT
Start: 2022-07-09 | End: 2022-07-11 | Stop reason: HOSPADM

## 2022-07-08 RX ORDER — CETIRIZINE HYDROCHLORIDE 10 MG/1
10 TABLET ORAL DAILY
Status: DISCONTINUED | OUTPATIENT
Start: 2022-07-08 | End: 2022-07-11 | Stop reason: HOSPADM

## 2022-07-08 RX ORDER — ASPIRIN 81 MG/1
81 TABLET, CHEWABLE ORAL DAILY
Status: DISCONTINUED | OUTPATIENT
Start: 2022-07-08 | End: 2022-07-11 | Stop reason: HOSPADM

## 2022-07-08 RX ORDER — ENOXAPARIN SODIUM 100 MG/ML
40 INJECTION SUBCUTANEOUS DAILY
Status: DISCONTINUED | OUTPATIENT
Start: 2022-07-08 | End: 2022-07-11 | Stop reason: HOSPADM

## 2022-07-08 RX ORDER — SODIUM CHLORIDE 9 MG/ML
1000 INJECTION, SOLUTION INTRAVENOUS CONTINUOUS
Status: DISCONTINUED | OUTPATIENT
Start: 2022-07-08 | End: 2022-07-08

## 2022-07-08 RX ORDER — FUROSEMIDE 20 MG/1
20 TABLET ORAL DAILY
Status: DISCONTINUED | OUTPATIENT
Start: 2022-07-08 | End: 2022-07-11 | Stop reason: HOSPADM

## 2022-07-08 RX ORDER — BUDESONIDE 0.5 MG/2ML
0.5 INHALANT ORAL 2 TIMES DAILY
Status: DISCONTINUED | OUTPATIENT
Start: 2022-07-08 | End: 2022-07-11 | Stop reason: HOSPADM

## 2022-07-08 RX ORDER — SODIUM CHLORIDE, SODIUM LACTATE, POTASSIUM CHLORIDE, AND CALCIUM CHLORIDE .6; .31; .03; .02 G/100ML; G/100ML; G/100ML; G/100ML
1000 INJECTION, SOLUTION INTRAVENOUS ONCE
Status: COMPLETED | OUTPATIENT
Start: 2022-07-08 | End: 2022-07-08

## 2022-07-08 RX ORDER — IPRATROPIUM BROMIDE AND ALBUTEROL SULFATE 2.5; .5 MG/3ML; MG/3ML
3 SOLUTION RESPIRATORY (INHALATION)
Status: DISCONTINUED | OUTPATIENT
Start: 2022-07-08 | End: 2022-07-11 | Stop reason: HOSPADM

## 2022-07-08 RX ORDER — POLYETHYLENE GLYCOL 3350 17 G/17G
17 POWDER, FOR SOLUTION ORAL DAILY PRN
Status: DISCONTINUED | OUTPATIENT
Start: 2022-07-08 | End: 2022-07-11 | Stop reason: HOSPADM

## 2022-07-08 RX ORDER — GUAIFENESIN 600 MG/1
600 TABLET, EXTENDED RELEASE ORAL 2 TIMES DAILY
Status: DISCONTINUED | OUTPATIENT
Start: 2022-07-08 | End: 2022-07-11 | Stop reason: HOSPADM

## 2022-07-08 RX ORDER — ROPINIROLE 1 MG/1
2 TABLET, FILM COATED ORAL NIGHTLY
Status: DISCONTINUED | OUTPATIENT
Start: 2022-07-08 | End: 2022-07-11 | Stop reason: HOSPADM

## 2022-07-08 RX ORDER — INSULIN LISPRO 100 [IU]/ML
0-6 INJECTION, SOLUTION INTRAVENOUS; SUBCUTANEOUS
Status: DISCONTINUED | OUTPATIENT
Start: 2022-07-08 | End: 2022-07-11 | Stop reason: HOSPADM

## 2022-07-08 RX ORDER — ACETAMINOPHEN 650 MG/1
650 SUPPOSITORY RECTAL EVERY 6 HOURS PRN
Status: DISCONTINUED | OUTPATIENT
Start: 2022-07-08 | End: 2022-07-11 | Stop reason: HOSPADM

## 2022-07-08 RX ORDER — ONDANSETRON 2 MG/ML
4 INJECTION INTRAMUSCULAR; INTRAVENOUS EVERY 6 HOURS PRN
Status: DISCONTINUED | OUTPATIENT
Start: 2022-07-08 | End: 2022-07-11 | Stop reason: HOSPADM

## 2022-07-08 RX ORDER — DIVALPROEX SODIUM 250 MG/1
250 TABLET, EXTENDED RELEASE ORAL NIGHTLY
Status: DISCONTINUED | OUTPATIENT
Start: 2022-07-08 | End: 2022-07-11 | Stop reason: HOSPADM

## 2022-07-08 RX ORDER — PROPRANOLOL HYDROCHLORIDE 20 MG/1
20 TABLET ORAL 2 TIMES DAILY
Status: DISCONTINUED | OUTPATIENT
Start: 2022-07-08 | End: 2022-07-11 | Stop reason: HOSPADM

## 2022-07-08 RX ORDER — NALOXONE HYDROCHLORIDE 1 MG/ML
1 INJECTION INTRAMUSCULAR; INTRAVENOUS; SUBCUTANEOUS ONCE
Status: COMPLETED | OUTPATIENT
Start: 2022-07-08 | End: 2022-07-08

## 2022-07-08 RX ORDER — ACETAMINOPHEN 325 MG/1
650 TABLET ORAL EVERY 6 HOURS PRN
Status: DISCONTINUED | OUTPATIENT
Start: 2022-07-08 | End: 2022-07-11 | Stop reason: HOSPADM

## 2022-07-08 RX ORDER — NALOXONE HYDROCHLORIDE 0.4 MG/ML
0.4 INJECTION, SOLUTION INTRAMUSCULAR; INTRAVENOUS; SUBCUTANEOUS ONCE
Status: COMPLETED | OUTPATIENT
Start: 2022-07-08 | End: 2022-07-08

## 2022-07-08 RX ORDER — ONDANSETRON 4 MG/1
4 TABLET, ORALLY DISINTEGRATING ORAL EVERY 8 HOURS PRN
Status: DISCONTINUED | OUTPATIENT
Start: 2022-07-08 | End: 2022-07-11 | Stop reason: HOSPADM

## 2022-07-08 RX ORDER — NALOXONE HYDROCHLORIDE 1 MG/ML
1 INJECTION INTRAMUSCULAR; INTRAVENOUS; SUBCUTANEOUS ONCE
Status: DISCONTINUED | OUTPATIENT
Start: 2022-07-08 | End: 2022-07-08

## 2022-07-08 RX ORDER — INSULIN LISPRO 100 [IU]/ML
0-3 INJECTION, SOLUTION INTRAVENOUS; SUBCUTANEOUS NIGHTLY
Status: DISCONTINUED | OUTPATIENT
Start: 2022-07-08 | End: 2022-07-11 | Stop reason: HOSPADM

## 2022-07-08 RX ORDER — SUCRALFATE 1 G/1
1 TABLET ORAL 3 TIMES DAILY
Status: DISCONTINUED | OUTPATIENT
Start: 2022-07-08 | End: 2022-07-11 | Stop reason: HOSPADM

## 2022-07-08 RX ADMIN — BUDESONIDE 500 MCG: 0.5 INHALANT RESPIRATORY (INHALATION) at 20:39

## 2022-07-08 RX ADMIN — GUAIFENESIN 600 MG: 600 TABLET, EXTENDED RELEASE ORAL at 20:31

## 2022-07-08 RX ADMIN — PIPERACILLIN AND TAZOBACTAM 4500 MG: 4; .5 INJECTION, POWDER, FOR SOLUTION INTRAVENOUS at 13:50

## 2022-07-08 RX ADMIN — ENOXAPARIN SODIUM 40 MG: 100 INJECTION SUBCUTANEOUS at 21:43

## 2022-07-08 RX ADMIN — DIVALPROEX SODIUM 250 MG: 250 TABLET, EXTENDED RELEASE ORAL at 20:30

## 2022-07-08 RX ADMIN — NALOXONE HYDROCHLORIDE 1 MG: 1 INJECTION PARENTERAL at 12:09

## 2022-07-08 RX ADMIN — IPRATROPIUM BROMIDE AND ALBUTEROL SULFATE 3 ML: 2.5; .5 SOLUTION RESPIRATORY (INHALATION) at 20:39

## 2022-07-08 RX ADMIN — PIPERACILLIN AND TAZOBACTAM 3375 MG: 3; .375 INJECTION, POWDER, FOR SOLUTION INTRAVENOUS at 20:29

## 2022-07-08 RX ADMIN — SODIUM CHLORIDE, POTASSIUM CHLORIDE, SODIUM LACTATE AND CALCIUM CHLORIDE 1000 ML: 600; 310; 30; 20 INJECTION, SOLUTION INTRAVENOUS at 11:30

## 2022-07-08 RX ADMIN — NALOXONE HYDROCHLORIDE 0.4 MG: 0.4 INJECTION, SOLUTION INTRAMUSCULAR; INTRAVENOUS; SUBCUTANEOUS at 11:09

## 2022-07-08 ASSESSMENT — PAIN - FUNCTIONAL ASSESSMENT: PAIN_FUNCTIONAL_ASSESSMENT: NONE - DENIES PAIN

## 2022-07-08 NOTE — ED NOTES
[facility]       Lluvia Faust RN  07/08/22 8415

## 2022-07-08 NOTE — ED NOTES
Dr. Royal Castillo on phone with [facility] about possible admit      Johnathan Delvalle, RN  07/08/22 8468

## 2022-07-08 NOTE — ED NOTES
Received this  Year old white male at the ER. Patient arrived via POV per friend of the family. Patient very drowsy and difficult to obtain information from. Patient required staff members X 2 with great difficulty getting the patient moved from the wheelchair over to the bed. Patient noted to have his pants half way on and brief was full of urine and pants noted to also be very wet with urine and some stool incontinence noted also-. Patient noted to be very diaphoretic and pale. Oral mucosa dry and pink. Notified RT to evaluate for ABG and Bipap . Spo2 80-85 on RA. Large reddened rash noted in the groin area. Scrotum enlarged purplish reddened in color and penis inverted and difficult to insert [place] and urine noted to leak out frequently. Dr. Mathieu Miner is aware of this situation.       Alyssa Bruce RN  07/08/22 3446

## 2022-07-08 NOTE — ED PROVIDER NOTES
[address] [facility] ENCOUNTER      Pt Name: Stefanie Garcia  MRN: [id number]  YOB: 1960  Date of evaluation: 7/8/2022  Provider: Loan Holly MD    CHIEF COMPLAINT       Chief Complaint   Patient presents with    Altered Mental Status     drowsy was recently discharged from hospital 7/6 with pneumonia. HISTORY OF PRESENT ILLNESS  (Location/Symptom, Timing/Onset, Context/Setting, Quality, Duration, Modifying Factors, Severity.)   Stefanie Garcia is a 64 y.o. male who presents to the emergency department currently with altered mental status patient is able to state that he has been coughing productive of he is not sure that looks like that he has been nauseous and has thrown up a couple times he states that he has not thrown up blood he states that he has not had a fever he has been sleepy has had some diarrhea and has had decreased urine output since he went home 2 days ago he also states that he was not discharged on BiPAP. On review of his hospital chart he had an episode and which he had been given some pain medication and then became very sleepy when they checked his blood gas he was retaining CO2 and they placed him on BiPAP which then revived him. Nursing notes were reviewed. REVIEW OFSYSTEMS    (2-9 systems for level 4, 10 or more for level 5)   ROS:  General:  No fevers, no chills, no weakness  Cardiovascular:  No chest pain, no palpitations  Respiratory:  No shortness of breath, no cough, no wheezing  Gastrointestinal:  No pain, no nausea, no vomiting, no diarrhea  Musculoskeletal:  No muscle pain, no joint pain  Skin:  No rash, no easy bruising  Neurologic:  No speech problems, no headache, no extremity weakness  Psychiatric:  No anxiety  Genitourinary:  No dysuria, no hematuria    Except as noted above the remainder of the review of systems was reviewed and negative.        PAST MEDICAL HISTORY     Past Medical History:   Diagnosis Date    Anxiety     B12 deficiency     Cancer ([facility]     Prostate    Chronic constipation     COPD [facility]chronic obstructive pulmonary disease) ([facility]     DDD (degenerative disc disease), lumbar     Depression     [facility]     GERD (gastroesophageal reflux disease)     Hyperlipidemia     Hypertension     Lower back pain          SURGICAL HISTORY       Past Surgical History:   Procedure Laterality Date    CHOLECYSTECTOMY      PROSTATECTOMY  2018         CURRENT MEDICATIONS       Previous Medications    ALBUTEROL SULFATE HFA (PROAIR HFA) 108 (90 BASE) MCG/ACT INHALER    Inhale 2 puffs into the lungs every 6 hours as needed for Wheezing    ASPIRIN 81 MG TABLET    Take 81 mg by mouth daily    ATORVASTATIN (LIPITOR) 80 MG TABLET    Take 80 mg by mouth daily    CEFDINIR (OMNICEF) 300 MG CAPSULE    Take 1 capsule by mouth 2 times daily for 7 days    CYANOCOBALAMIN 1000 MCG TABLET    Take 1,000 mcg by mouth daily    DICLOFENAC SODIUM (VOLTAREN) 1 % GEL    Apply 2 g topically 2 times daily    DIVALPROEX (DEPAKOTE ER) 250 MG EXTENDED RELEASE TABLET    Take 250 mg by mouth nightly    EMPAGLIFLOZIN (JARDIANCE) 10 MG TABLET    Take 10 mg by mouth daily    FENOFIBRATE 160 MG TABLET    Take 160 mg by mouth daily As of 7/5/2022 could not find fill history but patient said he should be getting a new prescription on Friday. Left on list per Maru BOND  -Shelbie Menjivar    FEXOFENADINE (ALLEGRA) 180 MG TABLET    Take 180 mg by mouth daily    FUROSEMIDE (LASIX) 20 MG TABLET    Take 20 mg by mouth daily    GLIPIZIDE (GLUCOTROL) 10 MG TABLET    Take 10 mg by mouth 2 times daily (before meals)    GUAIFENESIN (MUCINEX) 600 MG EXTENDED RELEASE TABLET    Take 1 tablet by mouth 2 times daily    INSULIN DETEMIR (LEVEMIR FLEXTOUCH) 100 UNIT/ML INJECTION PEN    Inject 5 Units into the skin nightly    IPRATROPIUM-ALBUTEROL (DUONEB) 0.5-2.5 (3) MG/3ML SOLN NEBULIZER SOLUTION    Inhale 3 mLs into the lungs every 4 hours (while awake)    METFORMIN (GLUCOPHAGE) 1000 MG TABLET    Take 1,000 mg by mouth 2 times daily (with meals)    PANTOPRAZOLE SODIUM (PROTONIX) 40 MG PACK PACKET    Take 40 mg by mouth every morning (before breakfast)    PROPRANOLOL (INDERAL) 20 MG TABLET    Take 1 tablet by mouth 2 times daily    ROPINIROLE (REQUIP) 2 MG TABLET    Take 2 mg by mouth nightly     SERTRALINE (ZOLOFT) 100 MG TABLET    Take 100 mg by mouth daily    SITAGLIPTIN (JANUVIA) 100 MG TABLET    Take 100 mg by mouth daily    SUCRALFATE (CARAFATE) 1 GM TABLET    Take 1 g by mouth 3 times daily    TIOTROPIUM (SPIRIVA HANDIHALER) 18 MCG INHALATION CAPSULE    Inhale 18 mcg into the lungs daily    VITAMIN D (ERGOCALCIFEROL) 1.25 MG (20882 UT) CAPS CAPSULE    Take 1 capsule by mouth once a week       ALLERGIES     Patient has no known allergies. FAMILY HISTORY     No family history on file. SOCIAL HISTORY       Social History     Socioeconomic History    Marital status: Single     Spouse name: Not on file    Number of children: Not on file    Years of education: Not on file    Highest education level: Not on file   Occupational History    Not on file   Tobacco Use    Smoking status: Former Smoker     Packs/day: 2.00     Years: 25.00     Pack years: 50.00     Types: Cigarettes     Quit date: 2022     Years since quittin.4    Smokeless tobacco: Current User     Types: Chew    Tobacco comment: Quit smoking 6 months ago.    Substance and Sexual Activity    Alcohol use: No    Drug use: No    Sexual activity: Not Currently     Partners: Female   Other Topics Concern    Not on file   Social History Narrative    Not on file     Social Determinants of Health     Financial Resource Strain:     Difficulty of Paying Living Expenses: Not on file   Food Insecurity:     Worried About Running Out of Food in the Last Year: Not on file    Armando of Food in the Last Year: Not on file   Transportation Needs:     Lack where he is leaking urine he has red changes consistent with yeast infection covering his entire scrotum especially the left side as well as into his left groin and upper thigh. Musculoskeletal:  No focal muscle deficits are appreciated unable to stand on his own states secondary to apparent weakness of his legs. Neuro: Motor intact, sensory intact, level of consciousness is decreased   Dermatology: Skin is warm and dry        DIAGNOSTIC RESULTS     EKG: All EKG's are interpreted by the Emergency Department Physician who either signs or Co-signs this chart in the [address] a cardiologist.    The EKG interpreted by me shows this rhythm rate of 91 no acute ST changes    RADIOLOGY:   Non-plain film images such as CT, Ultrasound and MRI are read by the radiologist. Plain radiographic images are visualized and preliminarily interpreted by the emergency physician with the below findings:      ? Radiologist's Report Reviewed:  CT Head WO Contrast   Final Result      No acute intracranial hemorrhage or mass effect. XR CHEST PORTABLE   Final Result   1. Extensive right-sided airspace disease compatible with pneumonia. Additional patchy asymmetric airspace opacity in the left lung suggesting bilateral pneumonia. 2. Superimposed pulmonary venous congestion.             ED BEDSIDE ULTRASOUND:   Performed by ED Physician - none    LABS:    I have reviewed and interpreted all of the currently available lab results from this visit (ifapplicable):  Results for orders placed or performed during the hospital encounter of 07/08/22   COVID-19, Rapid    Specimen: Nasopharyngeal Swab   Result Value Ref Range    SARS-CoV-2, NAAT Not Detected Not Detected   CBC with Auto Differential   Result Value Ref Range    WBC 10.6 4.0 - 11.0 K/uL    RBC 4.28 (L) 4.50 - 6.00 M/uL    Hemoglobin 12.4 (L) 13.0 - 18.0 g/dL    Hematocrit 38.4 (L) 40.0 - 54.0 %    MCV 89.7 80.0 - 100.0 fL    MCH 29.0 27.0 - 32.0 pg    MCHC 32.3 31.0 - 35.0 g/dL RDW 13.7 11.0 - 16.0 %    Platelets 875 045 - 774 K/uL    MPV 8.1 6.0 - 10.0 fL    Neutrophils % 84.4 %    Immature Granulocytes % 0.4 0.0 - 5.0 %    Lymphocytes % 7.4 %    Monocytes % 7.1 %    Eosinophils % 0.5 %    Basophils % 0.2 %    Neutrophils Absolute 9.0 (H) 2.0 - 7.5 K/uL    Immature Granulocytes # 0.0 K/uL    Lymphocytes Absolute 0.8 (L) 1.5 - 4.0 K/uL    Monocytes Absolute 0.8 0.2 - 0.8 K/uL    Eosinophils Absolute 0.1 0.0 - 0.4 K/uL    Basophils Absolute 0.0 0.0 - 0.1 K/uL   Comprehensive Metabolic Panel w/ Reflex to MG   Result Value Ref Range    Sodium 135 (L) 136 - 145 mmol/L    Potassium reflex Magnesium 4.6 3.4 - 5.1 mmol/L    Chloride 98 98 - 107 mmol/L    CO2 24 20 - 30 mmol/L    Anion Gap 13 3 - 16    Glucose 198 (H) 74 - 106 mg/dL    BUN 22 (H) 6 - 20 mg/dL    CREATININE 1.1 0.4 - 1.2 mg/dL    GFR Non-African American >59 >59    GFR African American >59 >59    Calcium 10.1 8.5 - 10.5 mg/dL    Total Protein 6.9 6.4 - 8.3 g/dL    Albumin 3.6 3.4 - 4.8 g/dL    Albumin/Globulin Ratio 1.1 0.8 - 2.0    Total Bilirubin <0.2 (L) 0.3 - 1.2 mg/dL    Alkaline Phosphatase 103 (H) 25 - 100 U/L    ALT 38 (H) 4 - 36 U/L    AST 20 8 - 33 U/L    Globulin 3.3 Not Established g/dL   Blood Gas, Arterial   Result Value Ref Range    pH, Arterial 7.344 (L) 7.350 - 7.450    pCO2, Arterial 46.6 (H) 35.0 - 45.0 mmHg    pO2, Arterial 54.2 (L) 80.0 - 100.0 mmHg    HCO3, Arterial 24.8 22.0 - 26.0 mmol/L    Base Excess, Arterial -1.2 -3.0 - 3.0 mmol/L    O2 Sat, Arterial 83.8 (LL) >92 %    TCO2, Arterial 26.2 24.0 - 30.0 mmol/L    O2 Therapy Unknown     FIO2 0.21 Not Established %   POCT Glucose   Result Value Ref Range    POC Glucose 188 (H) 74 - 106 mg/dl    Performed on ACCU-CHEK         All other labs were within normal range or not returned as of this dictation.     EMERGENCY DEPARTMENT COURSE and DIFFERENTIAL DIAGNOSIS/MDM:   Vitals:    Vitals:    07/08/22 1043 07/08/22 1110 07/08/22 1115 07/08/22 1145   BP:   113/61 116/63   Pulse: 91  (!) 102 87   Resp: 14  17 14   Temp: 99.8 °F (37.7 °C)      TempSrc: Axillary      SpO2: (!) 80% 96% 97% 98%       MEDICATIONS ADMINISTERED IN ED:  Medications   0.9 % sodium chloride infusion (has no administration in time range)   piperacillin-tazobactam (ZOSYN) 4,500 mg in dextrose 5 % 100 mL IVPB (mini-bag) (has no administration in time range)   lactated ringers bolus (1,000 mLs IntraVENous New Bag 7/8/22 1130)   naloxone (NARCAN) injection 0.4 mg (0.4 mg IntraVENous Given 7/8/22 1109)   naloxone (NARCAN) injection 1 mg (1 mg IntraVENous Given 7/8/22 1209)       Patient is still pretty somnolent even after Narcan his chest x-ray now shows bilateral pneumonia however I discussed this with the hospitalist and we are going to go ahead and readmit him to the hospital at her request I have started him on Zosyn at this time. The patient will follow-up with their PCP in 1-2 days for reevaluation. If the patient or family members have anyfurther concerns or any worsening symptoms they will return to the ED for reevaluation. CONSULTS:  None    PROCEDURES:  Procedures    CRITICAL CARE TIME    Total Critical Care time was 0 minutes, excluding separately reportable procedures. There was a high probability of clinically significant/life threatening deterioration in the patient's condition which required my urgent intervention. FINAL IMPRESSION      1. Altered mental status, unspecified altered mental status type New Problem   2. Pneumonia of both lungs due to infectious organism, unspecified part of lung Worsening         DISPOSITION/PLAN   DISPOSITION    Admit to hospital    PATIENT REFERRED TO:  No follow-up provider specified.     DISCHARGE MEDICATIONS:  New Prescriptions    No medications on file       Comment: Please note this report has been produced using speech recognition software and may contain errorsrelated to that system including errors in grammar, punctuation, and spelling, as well as words and phrases that may be inappropriate. If there are any questions or concerns please feel free to contact the dictating providerfor clarification.     Raj Mendieta MD  Attending Emergency Physician              Raj Mendieta MD  07/08/22 0744

## 2022-07-09 PROBLEM — J96.01 ACUTE RESPIRATORY FAILURE WITH HYPOXIA (HCC): Status: ACTIVE | Noted: 2021-02-07

## 2022-07-09 PROBLEM — J69.0 ASPIRATION PNEUMONIA (HCC): Status: ACTIVE | Noted: 2021-02-07

## 2022-07-09 PROBLEM — G47.33 OSA (OBSTRUCTIVE SLEEP APNEA): Status: ACTIVE | Noted: 2022-07-09

## 2022-07-09 PROBLEM — R82.5 POSITIVE URINE DRUG SCREEN: Status: ACTIVE | Noted: 2022-07-09

## 2022-07-09 PROBLEM — Z87.891 PERSONAL HISTORY OF TOBACCO USE: Status: ACTIVE | Noted: 2021-02-07

## 2022-07-09 LAB
A/G RATIO: 1.1 (ref 0.8–2)
ALBUMIN SERPL-MCNC: 3.2 G/DL (ref 3.4–4.8)
ALP BLD-CCNC: 82 U/L (ref 25–100)
ALT SERPL-CCNC: 35 U/L (ref 4–36)
ANION GAP SERPL CALCULATED.3IONS-SCNC: 10 MMOL/L (ref 3–16)
AST SERPL-CCNC: 20 U/L (ref 8–33)
BILIRUB SERPL-MCNC: 0.3 MG/DL (ref 0.3–1.2)
BUN BLDV-MCNC: 19 MG/DL (ref 6–20)
CALCIUM SERPL-MCNC: 9 MG/DL (ref 8.5–10.5)
CHLORIDE BLD-SCNC: 103 MMOL/L (ref 98–107)
CO2: 28 MMOL/L (ref 20–30)
CREAT SERPL-MCNC: 0.9 MG/DL (ref 0.4–1.2)
GFR AFRICAN AMERICAN: >59
GFR NON-AFRICAN AMERICAN: >59
GLOBULIN: 3 G/DL
GLUCOSE BLD-MCNC: 107 MG/DL (ref 74–106)
GLUCOSE BLD-MCNC: 189 MG/DL (ref 74–106)
GLUCOSE BLD-MCNC: 202 MG/DL (ref 74–106)
GLUCOSE BLD-MCNC: 222 MG/DL (ref 74–106)
GLUCOSE BLD-MCNC: 94 MG/DL (ref 74–106)
HCT VFR BLD CALC: 33.1 % (ref 40–54)
HEMOGLOBIN: 10.5 G/DL (ref 13–18)
MCH RBC QN AUTO: 28.8 PG (ref 27–32)
MCHC RBC AUTO-ENTMCNC: 31.7 G/DL (ref 31–35)
MCV RBC AUTO: 90.9 FL (ref 80–100)
PDW BLD-RTO: 13.6 % (ref 11–16)
PERFORMED ON: ABNORMAL
PLATELET # BLD: 309 K/UL (ref 150–400)
PMV BLD AUTO: 8.2 FL (ref 6–10)
POTASSIUM REFLEX MAGNESIUM: 4.3 MMOL/L (ref 3.4–5.1)
RBC # BLD: 3.64 M/UL (ref 4.5–6)
SODIUM BLD-SCNC: 141 MMOL/L (ref 136–145)
TOTAL PROTEIN: 6.2 G/DL (ref 6.4–8.3)
WBC # BLD: 6.6 K/UL (ref 4–11)

## 2022-07-09 PROCEDURE — 6360000002 HC RX W HCPCS: Performed by: PHYSICIAN ASSISTANT

## 2022-07-09 PROCEDURE — 80053 COMPREHEN METABOLIC PANEL: CPT

## 2022-07-09 PROCEDURE — 36415 COLL VENOUS BLD VENIPUNCTURE: CPT

## 2022-07-09 PROCEDURE — 6370000000 HC RX 637 (ALT 250 FOR IP): Performed by: PHYSICIAN ASSISTANT

## 2022-07-09 PROCEDURE — 94640 AIRWAY INHALATION TREATMENT: CPT

## 2022-07-09 PROCEDURE — 99223 1ST HOSP IP/OBS HIGH 75: CPT | Performed by: INTERNAL MEDICINE

## 2022-07-09 PROCEDURE — 1200000000 HC SEMI PRIVATE

## 2022-07-09 PROCEDURE — 85027 COMPLETE CBC AUTOMATED: CPT

## 2022-07-09 PROCEDURE — 94761 N-INVAS EAR/PLS OXIMETRY MLT: CPT

## 2022-07-09 PROCEDURE — 2580000003 HC RX 258: Performed by: PHYSICIAN ASSISTANT

## 2022-07-09 PROCEDURE — 6370000000 HC RX 637 (ALT 250 FOR IP)

## 2022-07-09 RX ORDER — ZINC OXIDE AND DIMETHICONE 120; 10 MG/G; MG/G
CREAM TOPICAL
Status: DISPENSED
Start: 2022-07-09 | End: 2022-07-09

## 2022-07-09 RX ADMIN — GUAIFENESIN 600 MG: 600 TABLET, EXTENDED RELEASE ORAL at 19:54

## 2022-07-09 RX ADMIN — IPRATROPIUM BROMIDE AND ALBUTEROL SULFATE 3 ML: 2.5; .5 SOLUTION RESPIRATORY (INHALATION) at 16:47

## 2022-07-09 RX ADMIN — PROPRANOLOL HYDROCHLORIDE 20 MG: 20 TABLET ORAL at 19:53

## 2022-07-09 RX ADMIN — Medication 1 UNITS: at 19:55

## 2022-07-09 RX ADMIN — ENOXAPARIN SODIUM 40 MG: 100 INJECTION SUBCUTANEOUS at 08:05

## 2022-07-09 RX ADMIN — FUROSEMIDE 20 MG: 20 TABLET ORAL at 08:05

## 2022-07-09 RX ADMIN — PIPERACILLIN AND TAZOBACTAM 3375 MG: 3; .375 INJECTION, POWDER, FOR SOLUTION INTRAVENOUS at 05:11

## 2022-07-09 RX ADMIN — ASPIRIN 81 MG: 81 TABLET, CHEWABLE ORAL at 08:05

## 2022-07-09 RX ADMIN — ROPINIROLE HYDROCHLORIDE 2 MG: 1 TABLET, FILM COATED ORAL at 19:53

## 2022-07-09 RX ADMIN — PIPERACILLIN AND TAZOBACTAM 3375 MG: 3; .375 INJECTION, POWDER, FOR SOLUTION INTRAVENOUS at 13:08

## 2022-07-09 RX ADMIN — DIVALPROEX SODIUM 250 MG: 250 TABLET, EXTENDED RELEASE ORAL at 19:54

## 2022-07-09 RX ADMIN — PROPRANOLOL HYDROCHLORIDE 20 MG: 20 TABLET ORAL at 08:05

## 2022-07-09 RX ADMIN — GUAIFENESIN 600 MG: 600 TABLET, EXTENDED RELEASE ORAL at 08:05

## 2022-07-09 RX ADMIN — CETIRIZINE HYDROCHLORIDE 10 MG: 10 TABLET, FILM COATED ORAL at 08:05

## 2022-07-09 RX ADMIN — BUDESONIDE 500 MCG: 0.5 INHALANT RESPIRATORY (INHALATION) at 16:47

## 2022-07-09 RX ADMIN — SUCRALFATE 1 G: 1 TABLET ORAL at 08:05

## 2022-07-09 RX ADMIN — BUDESONIDE 500 MCG: 0.5 INHALANT RESPIRATORY (INHALATION) at 05:09

## 2022-07-09 RX ADMIN — INSULIN LISPRO 2 UNITS: 100 INJECTION, SOLUTION INTRAVENOUS; SUBCUTANEOUS at 11:12

## 2022-07-09 RX ADMIN — IPRATROPIUM BROMIDE AND ALBUTEROL SULFATE 3 ML: 2.5; .5 SOLUTION RESPIRATORY (INHALATION) at 05:10

## 2022-07-09 RX ADMIN — IPRATROPIUM BROMIDE AND ALBUTEROL SULFATE 3 ML: 2.5; .5 SOLUTION RESPIRATORY (INHALATION) at 12:12

## 2022-07-09 RX ADMIN — SUCRALFATE 1 G: 1 TABLET ORAL at 19:54

## 2022-07-09 RX ADMIN — SUCRALFATE 1 G: 1 TABLET ORAL at 13:04

## 2022-07-09 RX ADMIN — ATORVASTATIN CALCIUM 80 MG: 80 TABLET, FILM COATED ORAL at 08:04

## 2022-07-09 RX ADMIN — PIPERACILLIN AND TAZOBACTAM 3375 MG: 3; .375 INJECTION, POWDER, FOR SOLUTION INTRAVENOUS at 19:54

## 2022-07-09 RX ADMIN — PANTOPRAZOLE SODIUM 40 MG: 40 TABLET, DELAYED RELEASE ORAL at 05:11

## 2022-07-09 RX ADMIN — INSULIN LISPRO 1 UNITS: 100 INJECTION, SOLUTION INTRAVENOUS; SUBCUTANEOUS at 17:32

## 2022-07-09 NOTE — PLAN OF CARE
Problem: Safety - Adult  Goal: Free from fall injury  7/9/2022 1030 by Jeremy Mccarty RN  Outcome: Progressing  7/9/2022 0413 by Marisela Samuel RN  Outcome: Progressing

## 2022-07-09 NOTE — H&P
History and Physical    Patient:  Jostin Bautista    CHIEF COMPLAINT:    Acute encephalopathy    HISTORY OF PRESENT ILLNESS:   The patient is a 64 y.o. male with PMH of anxiety, B12 deficiency, prostate cancer, chronic constipation, DDD, depression, diabetes mellitus type 2, GERD, COPD, hyperlipidemia, hypertension and chronic back pain  who presented to the emergency department via EMS for acute encephalopathy. Patient was able to report productive cough, nausea, vomiting, diarrhea and decreased urine output at time of presentation. Patient noted to be very somnolent upon arrival.  Per chart review, he was recently admitted to this facility from 7/3/2022 - 7/6/2022 for sepsis secondary to pneumonia. He was also noted to have an acute exacerbation of COPD at that time. Patient was treated with IV antibiotics, steroids, bronchodilators, mucolytic's with improvement. He had episode of acute encephalopathy during this hospital stay after receiving pain medication and home Seroquel and risperidone. Those medications were discontinued and mental status improved within 24 hours. Patient was no longer requiring oxygen on discharge. Vitals upon arrival: Blood pressure 113/61, pulse 91, respirations 14, temperature 99.8, oxygen saturation 80% on room air. Patient was placed on 4 L nasal cannula with appropriate recovery. Labs: WBC 10.6, hemoglobin 12.4, hematocrit 38.4, platelets 264, sodium 135, potassium 4.6, chloride 98, CO2 24, BUN 22, creatinine 1.1, anion gap 13, glucose 198, alkaline phos 103, ALT 38, AST 20, bilirubin less than 0.2. UDS positive for benzodiazepines, opiates and tricyclic's. COVID-19 not detected. UA unremarkable. ABG with pH 7.344, PCO2 46.6, PO2 54.2, O2 sat 83.8. Chest x-ray with extensive right-sided airspace disease compatible with pneumonia. Additional patchy asymmetric airspace opacity in the left lung suggesting bilateral pneumonia.   Superimposed pulmonary venous congestion. CT head with no acute intracranial hemorrhage or mass-effect. EKG with normal sinus rhythm. Patient was given IV fluids, Zosyn, Narcan (with no response) and then admitted for further care. Past Medical History:      Diagnosis Date    Anxiety     B12 deficiency     Cancer [facility])     Prostate    Chronic constipation     [facility]ch[facility] obstructive [facility] disease) ([facility])     DDD (degenerative disc disease), lumbar     Depression     Diabetes mellitus (HCC)     GERD (gastroesophageal reflux disease)     Hyperlipidemia     Hypertension     Lower back pain        Past Surgical History:      Procedure Laterality Date    CHOLECYSTECTOMY      PROSTATECTOMY  2018       Medications Prior to Admission:    Prior to Admission medications    Medication Sig Start Date End Date Taking?  Authorizing Provider   ipratropium-albuterol (DUONEB) 0.5-2.5 (3) MG/3ML SOLN nebulizer solution Inhale 3 mLs into the lungs every 4 hours (while awake) 7/6/22   ISAURA España   cefdinir (OMNICEF) 300 MG capsule Take 1 capsule by mouth 2 times daily for 7 days 7/6/22 7/13/22  ISAURA Ortega   guaiFENesin (MUCINEX) 600 MG extended release tablet Take 1 tablet by mouth 2 times daily 7/6/22   ISAURA Ortega   fexofenadine (ALLEGRA) 180 MG tablet Take 180 mg by mouth daily    Historical Provider, MD   rOPINIRole (REQUIP) 2 MG tablet Take 2 mg by mouth nightly     Historical Provider, MD   furosemide (LASIX) 20 MG tablet Take 20 mg by mouth daily    Historical Provider, MD   insulin detemir (LEVEMIR FLEXTOUCH) 100 UNIT/ML injection pen Inject 5 Units into the skin nightly    Historical Provider, MD   albuterol sulfate HFA (PROAIR HFA) 108 (90 Base) MCG/ACT inhaler Inhale 2 puffs into the lungs every 6 hours as needed for Wheezing    Historical Provider, MD   tiotropium (SPIRIVA HANDIHALER) 18 MCG inhalation capsule Inhale 18 mcg into the lungs daily    Historical Provider, MD   cyanocobalamin 1000 MCG tablet Take 1,000 mcg by mouth daily    Historical Provider, MD   propranolol (INDERAL) 20 MG tablet Take 1 tablet by mouth 2 times daily 2/17/21   Christopher Pool, PA   diclofenac sodium (VOLTAREN) 1 % GEL Apply 2 g topically 2 times daily 2/17/21 3/19/21  Chrisotpher Pool, PA   vitamin D (ERGOCALCIFEROL) 1.25 MG (27089 UT) CAPS capsule Take 1 capsule by mouth once a week 2/22/21   ISAURA Henderson   divalproex (DEPAKOTE ER) 250 MG extended release tablet Take 250 mg by mouth nightly    Historical Provider, MD   sucralfate (CARAFATE) 1 GM tablet Take 1 g by mouth 3 times daily    Historical Provider, MD   metFORMIN (GLUCOPHAGE) 1000 MG tablet Take 1,000 mg by mouth 2 times daily (with meals)    Historical Provider, MD   SITagliptin (JANUVIA) 100 MG tablet Take 100 mg by mouth daily    Historical Provider, MD   pantoprazole sodium (PROTONIX) 40 MG PACK packet Take 40 mg by mouth every morning (before breakfast)    Historical Provider, MD   glipiZIDE (GLUCOTROL) 10 MG tablet Take 10 mg by mouth 2 times daily (before meals)    Historical Provider, MD   empagliflozin (JARDIANCE) 10 MG tablet Take 10 mg by mouth daily    Historical Provider, MD   aspirin 81 MG tablet Take 81 mg by mouth daily    Historical Provider, MD   fenofibrate 160 MG tablet Take 160 mg by mouth daily As of 7/5/2022 could not find fill history but patient said he should be getting a new prescription on Friday. Left on list per [facility] [place] [facility].    Historical Provider, MD   sertraline (ZOLOFT) 100 MG tablet Take 100 mg by mouth daily    Historical Provider, MD   atorvastatin (LIPITOR) 80 MG tablet Take 80 mg by mouth daily    Historical Provider, MD       Allergies:  Patient has no known allergies. Social History:   TOBACCO:   reports that he quit smoking about 5 months ago. His smoking use included cigarettes. He has a 50.00 pack-year smoking history. His smokeless tobacco use includes chew. ETOH:   reports no history of alcohol use.   OCCUPATION: None    Family History:   History reviewed. No pertinent family history. Review of system  Constitutional:  Denies fever or chills   Eyes:  Denies eye pain or redness  HENT:  Denies nasal congestion or sore throat   Respiratory:  Positive for productive cough. Denies shortness of breath   Cardiovascular:  Denies chest pain or edema   GI:  Denies abdominal pain, nausea, vomiting, bloody stools or diarrhea   :  Denies dysuria or frequency  Musculoskeletal:  Denies acute neck pain or body aches  Integument:  Denies rash or itching  Neurologic:  Denies headache, dizziness, numbness, tingling or unilateral weakness. Positive for acute encephalopathy, now resolved. Psychiatric:  Denies acute depression or acute anxiety      Vital Signs  Temp: 97.7 °F (36.5 °C)  Heart Rate: 84  Resp: 14  BP: (!) 151/71  SpO2: 97 %  O2 Device: Nasal cannula  O2 Flow Rate (L/min): 3.5 L/min    vital signs reviewed in electronic chart. Physical exam  Constitutional:  Well developed, well nourished, male sitting in bedside chair in no acute distress  Eyes:  no scleral icterus, conjunctiva normal   HENT:  Atraumatic, external ears normal, nose normal, oropharynx moist, no pharyngeal exudates. Neck- supple, no JVD, no lymphadenopathy  Respiratory:  No respiratory distress, no wheezing. Scattered rhonchi. Cardiovascular:  Normal rate, normal rhythm, no murmurs, no gallops, no rubs, 1+ BLE edema   GI:  Soft, nondistended, normal bowel sounds, nontender, no voluntary guarding  : scrotal edema  Musculoskeletal:  No cyanosis or obvious acute deformity. Moving all extremities   Integument:  Warm and dry.   Lymphatic:  No cervical or axillary lymphadenopathy noted   Neurologic:  Alert & oriented x 3, no apparent focal deficits noted   Psychiatric:  Speech and behavior appropriate         Lab Results   Component Value Date     07/09/2022    K 4.3 07/09/2022     07/09/2022    CO2 28 07/09/2022    BUN 19 07/09/2022    CREATININE 0.9 07/09/2022    GLUCOSE 94 07/09/2022    CALCIUM 9.0 07/09/2022    PROT 6.2 (L) 07/09/2022    LABALBU 3.2 (L) 07/09/2022    BILITOT 0.3 07/09/2022    ALKPHOS 82 07/09/2022    AST 20 07/09/2022    ALT 35 07/09/2022    LABGLOM >59 07/09/2022    GFRAA >59 07/09/2022    AGRATIO 1.1 07/09/2022    GLOB 3.0 07/09/2022           Lab Results   Component Value Date    WBC 6.6 07/09/2022    HGB 10.5 (L) 07/09/2022    HCT 33.1 (L) 07/09/2022    MCV 90.9 07/09/2022     07/09/2022       PA/lat CXR:   CT Head WO Contrast   Final Result      No acute intracranial hemorrhage or mass effect. XR CHEST PORTABLE   Final Result   1. Extensive right-sided airspace disease compatible with pneumonia. Additional patchy asymmetric airspace opacity in the left lung suggesting bilateral pneumonia. 2. Superimposed pulmonary venous congestion. EKG: NSR, rate 91 bpm with no acute ST-T wave changes    Assessment and Plan     Active Hospital Problems    Diagnosis Date Noted    LEONEL (obstructive sleep apnea) [G47.33]  -suspect pt has underlying sleep apnea. Would benefit from OP sleep study.   -avoid oversedation    07/09/2022    Positive urine drug screen [R82.5]  -admission UDS + for opiates, BZDs and TCAs. Pt not prescribed opiates or BZDs and denies any use. 07/09/2022    Aspiration pneumonia ([facility] [J69.0]  -CXR 7/8: Extensive right-sided airspace disease compatible with pneumonia. Additional patchy asymmetric airspace opacity in the left lung suggesting bilateral pneumonia.   Superimposed pulmonary venous congestion.  -Suspect patient aspirated while being acutely encephalopathic from oversedation  -Covering with Zosyn  -Continue budesonide, DuoNebs, Mucinex and as needed robitussin  -encourage IS and ambulation  -O2 sats currently stable on RA   02/07/2021    Obesity [E66.9]  -BMI 96.36  -Complicates all aspects of care  -Counseled on diet and exercise after recovery   02/07/2021    Acute respiratory failure with hypoxia ([facility] [J96.01]  -Secondary to oversedation causing acute encephalopathy with resultant aspiration  -Admission ABG with PO2 54.2, O2 sat 83.8  -Continue treatment as above under aspiration pneumonia  -O2 sats maintained on room air 7/9 02/07/2021    Type 2 diabetes mellitus ([facility] [E11.9]  -A1c 6.1 7/5/22   -home regimen: Jardiance, metformin, Januvia, glipizide  -Home regimen resumed 7/9 after MS returned to baseline.  Also covered with SSI.   02/07/2021    Hypertension [I10]  -continue inderal   02/07/2021    Personal history of tobacco use [Z87.891]  -pt reports he stopped smoking 5 months ago  -encouraged continued cessation   02/07/2021    Acute encephalopathy [G93.40]  -Suspect secondary to oversedation given inappropriate UDS with benzos and opiates. Patient is not prescribed to benzos or opiates. He also denies any use PTA. -CT head 7/8 with no acute intracranial process  -will check EEG  -Mental status returned to normal on 7/9 02/06/2021     Patient was seen and examined by Dr. Vasquez Penn and plan of care reviewed. Treatment plan was formulated collaboratively.       ISAURA Liu certifies per CMS regulation for 42 .15(a), that the patient may reasonably be expected to be discharged or transferred to a hospital within 96 hours after admission to [address] [place] [address]    Electronically signed by ISAURA Liu on 7/9/2022 at 11:05 AM

## 2022-07-10 LAB
A/G RATIO: 1 (ref 0.8–2)
ALBUMIN SERPL-MCNC: 3.2 G/DL (ref 3.4–4.8)
ALP BLD-CCNC: 76 U/L (ref 25–100)
ALT SERPL-CCNC: 34 U/L (ref 4–36)
ANION GAP SERPL CALCULATED.3IONS-SCNC: 13 MMOL/L (ref 3–16)
AST SERPL-CCNC: 18 U/L (ref 8–33)
BILIRUB SERPL-MCNC: 0.5 MG/DL (ref 0.3–1.2)
BUN BLDV-MCNC: 16 MG/DL (ref 6–20)
CALCIUM SERPL-MCNC: 8.9 MG/DL (ref 8.5–10.5)
CHLORIDE BLD-SCNC: 99 MMOL/L (ref 98–107)
CO2: 26 MMOL/L (ref 20–30)
CREAT SERPL-MCNC: 0.8 MG/DL (ref 0.4–1.2)
GFR AFRICAN AMERICAN: >59
GFR NON-AFRICAN AMERICAN: >59
GLOBULIN: 3.1 G/DL
GLUCOSE BLD-MCNC: 141 MG/DL (ref 74–106)
GLUCOSE BLD-MCNC: 147 MG/DL (ref 74–106)
GLUCOSE BLD-MCNC: 177 MG/DL (ref 74–106)
GLUCOSE BLD-MCNC: 227 MG/DL (ref 74–106)
GLUCOSE BLD-MCNC: 239 MG/DL (ref 74–106)
HCT VFR BLD CALC: 30 % (ref 40–54)
HEMOGLOBIN: 9.8 G/DL (ref 13–18)
MCH RBC QN AUTO: 28.8 PG (ref 27–32)
MCHC RBC AUTO-ENTMCNC: 32.7 G/DL (ref 31–35)
MCV RBC AUTO: 88.2 FL (ref 80–100)
PDW BLD-RTO: 13.6 % (ref 11–16)
PERFORMED ON: ABNORMAL
PLATELET # BLD: 349 K/UL (ref 150–400)
PMV BLD AUTO: 8.2 FL (ref 6–10)
POTASSIUM REFLEX MAGNESIUM: 3.7 MMOL/L (ref 3.4–5.1)
RBC # BLD: 3.4 M/UL (ref 4.5–6)
SODIUM BLD-SCNC: 138 MMOL/L (ref 136–145)
TOTAL PROTEIN: 6.3 G/DL (ref 6.4–8.3)
WBC # BLD: 9.3 K/UL (ref 4–11)

## 2022-07-10 PROCEDURE — 80053 COMPREHEN METABOLIC PANEL: CPT

## 2022-07-10 PROCEDURE — 85027 COMPLETE CBC AUTOMATED: CPT

## 2022-07-10 PROCEDURE — 94761 N-INVAS EAR/PLS OXIMETRY MLT: CPT

## 2022-07-10 PROCEDURE — 6370000000 HC RX 637 (ALT 250 FOR IP): Performed by: PHYSICIAN ASSISTANT

## 2022-07-10 PROCEDURE — 1200000000 HC SEMI PRIVATE

## 2022-07-10 PROCEDURE — 99231 SBSQ HOSP IP/OBS SF/LOW 25: CPT | Performed by: INTERNAL MEDICINE

## 2022-07-10 PROCEDURE — 36415 COLL VENOUS BLD VENIPUNCTURE: CPT

## 2022-07-10 PROCEDURE — 94640 AIRWAY INHALATION TREATMENT: CPT

## 2022-07-10 PROCEDURE — 6360000002 HC RX W HCPCS: Performed by: PHYSICIAN ASSISTANT

## 2022-07-10 PROCEDURE — 2580000003 HC RX 258: Performed by: PHYSICIAN ASSISTANT

## 2022-07-10 RX ORDER — METHYLPREDNISOLONE SODIUM SUCCINATE 40 MG/ML
40 INJECTION, POWDER, LYOPHILIZED, FOR SOLUTION INTRAMUSCULAR; INTRAVENOUS DAILY
Status: DISCONTINUED | OUTPATIENT
Start: 2022-07-10 | End: 2022-07-11 | Stop reason: HOSPADM

## 2022-07-10 RX ADMIN — SUCRALFATE 1 G: 1 TABLET ORAL at 13:21

## 2022-07-10 RX ADMIN — Medication 1 UNITS: at 22:02

## 2022-07-10 RX ADMIN — BUDESONIDE 500 MCG: 0.5 INHALANT RESPIRATORY (INHALATION) at 06:17

## 2022-07-10 RX ADMIN — IPRATROPIUM BROMIDE AND ALBUTEROL SULFATE 3 ML: 2.5; .5 SOLUTION RESPIRATORY (INHALATION) at 16:43

## 2022-07-10 RX ADMIN — SUCRALFATE 1 G: 1 TABLET ORAL at 07:51

## 2022-07-10 RX ADMIN — ROPINIROLE HYDROCHLORIDE 2 MG: 1 TABLET, FILM COATED ORAL at 20:44

## 2022-07-10 RX ADMIN — PROPRANOLOL HYDROCHLORIDE 20 MG: 20 TABLET ORAL at 07:52

## 2022-07-10 RX ADMIN — GUAIFENESIN 600 MG: 600 TABLET, EXTENDED RELEASE ORAL at 20:44

## 2022-07-10 RX ADMIN — PANTOPRAZOLE SODIUM 40 MG: 40 TABLET, DELAYED RELEASE ORAL at 07:51

## 2022-07-10 RX ADMIN — BUDESONIDE 500 MCG: 0.5 INHALANT RESPIRATORY (INHALATION) at 16:43

## 2022-07-10 RX ADMIN — IPRATROPIUM BROMIDE AND ALBUTEROL SULFATE 3 ML: 2.5; .5 SOLUTION RESPIRATORY (INHALATION) at 12:08

## 2022-07-10 RX ADMIN — INSULIN LISPRO 2 UNITS: 100 INJECTION, SOLUTION INTRAVENOUS; SUBCUTANEOUS at 17:16

## 2022-07-10 RX ADMIN — GUAIFENESIN 600 MG: 600 TABLET, EXTENDED RELEASE ORAL at 07:51

## 2022-07-10 RX ADMIN — ENOXAPARIN SODIUM 40 MG: 100 INJECTION SUBCUTANEOUS at 07:51

## 2022-07-10 RX ADMIN — METHYLPREDNISOLONE SODIUM SUCCINATE 40 MG: 40 INJECTION, POWDER, FOR SOLUTION INTRAMUSCULAR; INTRAVENOUS at 11:02

## 2022-07-10 RX ADMIN — PROPRANOLOL HYDROCHLORIDE 20 MG: 20 TABLET ORAL at 20:44

## 2022-07-10 RX ADMIN — CETIRIZINE HYDROCHLORIDE 10 MG: 10 TABLET, FILM COATED ORAL at 07:51

## 2022-07-10 RX ADMIN — ACETAMINOPHEN 650 MG: 325 TABLET, FILM COATED ORAL at 07:51

## 2022-07-10 RX ADMIN — ASPIRIN 81 MG: 81 TABLET, CHEWABLE ORAL at 07:51

## 2022-07-10 RX ADMIN — PIPERACILLIN AND TAZOBACTAM 3375 MG: 3; .375 INJECTION, POWDER, FOR SOLUTION INTRAVENOUS at 20:46

## 2022-07-10 RX ADMIN — SUCRALFATE 1 G: 1 TABLET ORAL at 20:44

## 2022-07-10 RX ADMIN — FUROSEMIDE 20 MG: 20 TABLET ORAL at 07:51

## 2022-07-10 RX ADMIN — IPRATROPIUM BROMIDE AND ALBUTEROL SULFATE 3 ML: 2.5; .5 SOLUTION RESPIRATORY (INHALATION) at 06:17

## 2022-07-10 RX ADMIN — INSULIN LISPRO 1 UNITS: 100 INJECTION, SOLUTION INTRAVENOUS; SUBCUTANEOUS at 11:09

## 2022-07-10 RX ADMIN — ATORVASTATIN CALCIUM 80 MG: 80 TABLET, FILM COATED ORAL at 07:52

## 2022-07-10 RX ADMIN — DIVALPROEX SODIUM 250 MG: 250 TABLET, EXTENDED RELEASE ORAL at 20:44

## 2022-07-10 RX ADMIN — PIPERACILLIN AND TAZOBACTAM 3375 MG: 3; .375 INJECTION, POWDER, FOR SOLUTION INTRAVENOUS at 13:20

## 2022-07-10 RX ADMIN — INSULIN LISPRO 1 UNITS: 100 INJECTION, SOLUTION INTRAVENOUS; SUBCUTANEOUS at 07:53

## 2022-07-10 RX ADMIN — PIPERACILLIN AND TAZOBACTAM 3375 MG: 3; .375 INJECTION, POWDER, FOR SOLUTION INTRAVENOUS at 04:03

## 2022-07-10 ASSESSMENT — PAIN SCALES - GENERAL: PAINLEVEL_OUTOF10: 2

## 2022-07-10 NOTE — PROGRESS NOTES
Pt requesting to walk in hallway. Assisted pt with walker to hallway and walked about 15 ft and then went back to room.

## 2022-07-10 NOTE — PROGRESS NOTES
Progress Note      Subjective:   Chief complaint:   Acute encephalopathy    Interval History:   Patient seen and examined this morning. He appears comfortable while sitting in the bedside chair. He is no longer requiring oxygen. Patient noted to be wheezing on exam.  Per family report yesterday afternoon, patient is prescribed benzodiazepines and opiates. Antoinette Briscoe obtained showing last fill date was in April and May, respectively. Review of systems:   Constitutional:  Denies fever or chills   Eyes:  Denies eye pain or redness  HENT:  Denies nasal congestion or sore throat   Respiratory:  Positive for productive cough. Denies shortness of breath   Cardiovascular:  Denies chest pain or edema   GI:  Denies abdominal pain, nausea, vomiting, bloody stools or diarrhea   :  Denies dysuria or frequency  Musculoskeletal:  Denies acute neck pain or body aches  Integument:  Denies rash or itching  Neurologic:  Denies headache, dizziness, numbness, tingling or unilateral weakness. Positive for acute encephalopathy, now resolved. Psychiatric:  Denies acute depression or acute anxiety    Past medical history, surgical history, family history and social history reviewed and unchanged compared to H&P earlier this admission.     Medications:   Scheduled Meds:   methylPREDNISolone  40 mg IntraVENous Daily    aspirin  81 mg Oral Daily    atorvastatin  80 mg Oral Daily    divalproex  250 mg Oral Nightly    cetirizine  10 mg Oral Daily    furosemide  20 mg Oral Daily    guaiFENesin  600 mg Oral BID    ipratropium-albuterol  3 mL Inhalation Q4H WA    insulin lispro  0-6 Units SubCUTAneous TID WC    insulin lispro  0-3 Units SubCUTAneous Nightly    propranolol  20 mg Oral BID    pantoprazole  40 mg Oral QAM AC    rOPINIRole  2 mg Oral Nightly    sucralfate  1 g Oral TID    vitamin D  50,000 Units Oral Weekly    enoxaparin  40 mg SubCUTAneous Daily    budesonide  0.5 mg Nebulization BID    piperacillin-tazobactam 3,375 mg IntraVENous Q8H     Continuous Infusions:    Objective:     Vital Signs  Temp: 99 °F (37.2 °C)  Heart Rate: 87  Resp: 16  BP: (!) 155/68  SpO2: 90 %  O2 Device: None (Room air)  O2 Flow Rate (L/min): 3.5 L/min    Vital signs reviewed in electronic charts. Physical exam  Constitutional:  Well developed, well nourished, male sitting in bedside chair in no acute distress  Eyes:  no scleral icterus, conjunctiva normal   HENT:  Atraumatic, external ears normal, nose normal, oropharynx moist, no pharyngeal exudates. Neck- supple, no JVD, no lymphadenopathy  Respiratory:  No respiratory distress. Expiratory wheezing noted. Scattered rhonchi present. Cardiovascular:  Normal rate, normal rhythm, no murmurs, no gallops, no rubs, 1+ BLE edema   GI:  Soft, nondistended, normal bowel sounds, nontender, no voluntary guarding  : scrotal edema  Musculoskeletal:  No cyanosis or obvious acute deformity. Moving all extremities   Integument:  Warm and dry. Lymphatic:  No cervical or axillary lymphadenopathy noted   Neurologic:  Alert & oriented x 3, no apparent focal deficits noted   Psychiatric:  Speech and behavior appropriate     Results:     Lab Results   Component Value Date    WBC 9.3 07/10/2022    HGB 9.8 (L) 07/10/2022    HCT 30.0 (L) 07/10/2022    MCV 88.2 07/10/2022     07/10/2022       Lab Results   Component Value Date/Time     07/10/2022 04:45 AM    K 3.7 07/10/2022 04:45 AM    CL 99 07/10/2022 04:45 AM    CO2 26 07/10/2022 04:45 AM    BUN 16 07/10/2022 04:45 AM    CREATININE 0.8 07/10/2022 04:45 AM    GLUCOSE 147 07/10/2022 04:45 AM    CALCIUM 8.9 07/10/2022 04:45 AM        Assessment and Plan: Active Hospital Problems     Diagnosis Date Noted    LEONEL (obstructive sleep apnea) [G47.33]  -suspect pt has underlying sleep apnea. Would benefit from OP sleep study.   -avoid oversedation. Have recommended pt discontinue home BZD and opiate, or at least not take close to bedtime.     07/09/2022  Positive urine drug screen [R82.5]  -admission UDS + for opiates, BZDs and TCAs. -per GUILLERMINA, last file date was in April and May, however, pt reports having current prescriptions.    07/09/2022    Aspiration pneumonia ([facility] [J69.0]  -CXR 7/8: Extensive right-sided airspace disease compatible with pneumonia. Additional patchy asymmetric airspace opacity in the left lung suggesting bilateral pneumonia. Superimposed pulmonary venous congestion.  -Suspect patient aspirated while being acutely encephalopathic from oversedation  -Covering with Zosyn  -Continue budesonide, DuoNebs, Mucinex and as needed robitussin  -encourage IS and ambulation  -O2 sats currently stable on RA    02/07/2021    Obesity [E66.9]  -BMI 30.43  -Complicates all aspects of care  -Counseled on diet and exercise after recovery    02/07/2021    Acute respiratory failure with hypoxia (HCC) [J96.01]  -Secondary to oversedation causing acute encephalopathy with resultant aspiration  -Admission ABG with PO2 54.2, O2 sat 83.8  -Continue treatment as above under aspiration pneumonia  -O2 sats maintained on room air 7/9  -pt wheezing on exam 7/10. Suspect possible mild COPD exacerbation. Added IV steroids 7/10. COPD exacerbation  -As above, noted to be wheezing on exam 7/10. Suspect possible mild COPD exacerbation. Added IV Solu-Medrol 7/10. Continue budesonide and DuoNebs.    02/07/2021    Type 2 diabetes mellitus ([facility] [E11.9]  -A1c 6.1 7/5/22   -home regimen: Jardiance, metformin, Januvia, glipizide  -Home regimen resumed 7/9 after MS returned to baseline.  Also covered with SSI.    02/07/2021    Hypertension [I10]  -continue inderal    02/07/2021    Personal history of tobacco use [Z87.891]  -pt reports he stopped smoking 5 months ago  -encouraged continued cessation    02/07/2021    Acute encephalopathy [G93.40]  -Suspect secondary to oversedation from home BZDs and opiates. Recommended pt dc BZDs and opiates.   -CT head 7/8 with no acute intracranial process  -EEG ordered  -Mental status returned to normal on 7/9          Patient was seen and examined by Dr. Hodan Hill and plan of care reviewed. Treatment plan was formulated collaboratively.       Electronically signed by ISAURA Ross on 7/10/2022 at 10:55 AM

## 2022-07-11 ENCOUNTER — APPOINTMENT (OUTPATIENT)
Dept: GENERAL RADIOLOGY | Facility: HOSPITAL | Age: 62
DRG: 177 | End: 2022-07-11
Payer: MEDICAID

## 2022-07-11 VITALS
OXYGEN SATURATION: 92 % | TEMPERATURE: 97.9 F | RESPIRATION RATE: 18 BRPM | SYSTOLIC BLOOD PRESSURE: 143 MMHG | HEART RATE: 73 BPM | DIASTOLIC BLOOD PRESSURE: 75 MMHG

## 2022-07-11 PROBLEM — R13.10 DYSPHAGIA: Status: ACTIVE | Noted: 2022-07-11

## 2022-07-11 PROBLEM — E04.1 THYROID NODULE: Status: ACTIVE | Noted: 2022-07-11

## 2022-07-11 LAB
A/G RATIO: 1 (ref 0.8–2)
ALBUMIN SERPL-MCNC: 3.5 G/DL (ref 3.4–4.8)
ALP BLD-CCNC: 87 U/L (ref 25–100)
ALT SERPL-CCNC: 54 U/L (ref 4–36)
ANION GAP SERPL CALCULATED.3IONS-SCNC: 11 MMOL/L (ref 3–16)
AST SERPL-CCNC: 25 U/L (ref 8–33)
BILIRUB SERPL-MCNC: 0.3 MG/DL (ref 0.3–1.2)
BUN BLDV-MCNC: 19 MG/DL (ref 6–20)
CALCIUM SERPL-MCNC: 9.5 MG/DL (ref 8.5–10.5)
CHLORIDE BLD-SCNC: 104 MMOL/L (ref 98–107)
CO2: 26 MMOL/L (ref 20–30)
CREAT SERPL-MCNC: 0.8 MG/DL (ref 0.4–1.2)
GFR AFRICAN AMERICAN: >59
GFR NON-AFRICAN AMERICAN: >59
GLOBULIN: 3.4 G/DL
GLUCOSE BLD-MCNC: 199 MG/DL (ref 74–106)
GLUCOSE BLD-MCNC: 203 MG/DL (ref 74–106)
GLUCOSE BLD-MCNC: 294 MG/DL (ref 74–106)
HCT VFR BLD CALC: 33 % (ref 40–54)
HEMOGLOBIN: 10.8 G/DL (ref 13–18)
MCH RBC QN AUTO: 28.9 PG (ref 27–32)
MCHC RBC AUTO-ENTMCNC: 32.7 G/DL (ref 31–35)
MCV RBC AUTO: 88.2 FL (ref 80–100)
PDW BLD-RTO: 13.4 % (ref 11–16)
PERFORMED ON: ABNORMAL
PERFORMED ON: ABNORMAL
PLATELET # BLD: 365 K/UL (ref 150–400)
PMV BLD AUTO: 8.2 FL (ref 6–10)
POTASSIUM REFLEX MAGNESIUM: 4.4 MMOL/L (ref 3.4–5.1)
RBC # BLD: 3.74 M/UL (ref 4.5–6)
SODIUM BLD-SCNC: 141 MMOL/L (ref 136–145)
TOTAL PROTEIN: 6.9 G/DL (ref 6.4–8.3)
WBC # BLD: 8.9 K/UL (ref 4–11)

## 2022-07-11 PROCEDURE — 97116 GAIT TRAINING THERAPY: CPT

## 2022-07-11 PROCEDURE — 2580000003 HC RX 258: Performed by: PHYSICIAN ASSISTANT

## 2022-07-11 PROCEDURE — 85027 COMPLETE CBC AUTOMATED: CPT

## 2022-07-11 PROCEDURE — 92610 EVALUATE SWALLOWING FUNCTION: CPT

## 2022-07-11 PROCEDURE — 6360000002 HC RX W HCPCS: Performed by: PHYSICIAN ASSISTANT

## 2022-07-11 PROCEDURE — 99238 HOSP IP/OBS DSCHRG MGMT 30/<: CPT | Performed by: INTERNAL MEDICINE

## 2022-07-11 PROCEDURE — 71046 X-RAY EXAM CHEST 2 VIEWS: CPT

## 2022-07-11 PROCEDURE — 6370000000 HC RX 637 (ALT 250 FOR IP): Performed by: PHYSICIAN ASSISTANT

## 2022-07-11 PROCEDURE — 36415 COLL VENOUS BLD VENIPUNCTURE: CPT

## 2022-07-11 PROCEDURE — 94761 N-INVAS EAR/PLS OXIMETRY MLT: CPT

## 2022-07-11 PROCEDURE — 97530 THERAPEUTIC ACTIVITIES: CPT

## 2022-07-11 PROCEDURE — 94618 PULMONARY STRESS TESTING: CPT

## 2022-07-11 PROCEDURE — 94640 AIRWAY INHALATION TREATMENT: CPT

## 2022-07-11 PROCEDURE — 97165 OT EVAL LOW COMPLEX 30 MIN: CPT

## 2022-07-11 PROCEDURE — 97161 PT EVAL LOW COMPLEX 20 MIN: CPT

## 2022-07-11 PROCEDURE — 80053 COMPREHEN METABOLIC PANEL: CPT

## 2022-07-11 RX ORDER — ZINC OXIDE AND DIMETHICONE 120; 10 MG/G; MG/G
CREAM TOPICAL 3 TIMES DAILY PRN
Status: DISCONTINUED | OUTPATIENT
Start: 2022-07-11 | End: 2022-07-11 | Stop reason: HOSPADM

## 2022-07-11 RX ORDER — FUROSEMIDE 10 MG/ML
20 INJECTION INTRAMUSCULAR; INTRAVENOUS ONCE
Status: COMPLETED | OUTPATIENT
Start: 2022-07-11 | End: 2022-07-11

## 2022-07-11 RX ORDER — PREDNISONE 10 MG/1
TABLET ORAL
Qty: 12 TABLET | Refills: 0 | Status: SHIPPED | OUTPATIENT
Start: 2022-07-12 | End: 2022-09-08

## 2022-07-11 RX ORDER — FLUTICASONE PROPIONATE AND SALMETEROL 100; 50 UG/1; UG/1
1 POWDER RESPIRATORY (INHALATION) EVERY 12 HOURS
Qty: 1 EACH | Refills: 0 | Status: SHIPPED | OUTPATIENT
Start: 2022-07-11

## 2022-07-11 RX ORDER — FOLIC ACID 1 MG/1
1 TABLET ORAL DAILY
Status: DISCONTINUED | OUTPATIENT
Start: 2022-07-11 | End: 2022-07-11 | Stop reason: HOSPADM

## 2022-07-11 RX ORDER — AMOXICILLIN AND CLAVULANATE POTASSIUM 875; 125 MG/1; MG/1
1 TABLET, FILM COATED ORAL 2 TIMES DAILY
Qty: 10 TABLET | Refills: 0 | Status: SHIPPED | OUTPATIENT
Start: 2022-07-11 | End: 2022-07-16

## 2022-07-11 RX ORDER — FOLIC ACID 1 MG/1
1 TABLET ORAL DAILY
Qty: 30 TABLET | Refills: 3 | Status: SHIPPED | OUTPATIENT
Start: 2022-07-12

## 2022-07-11 RX ORDER — GUAIFENESIN 600 MG/1
600 TABLET, EXTENDED RELEASE ORAL 2 TIMES DAILY
Qty: 14 TABLET | Refills: 0 | Status: SHIPPED | OUTPATIENT
Start: 2022-07-11 | End: 2022-07-18

## 2022-07-11 RX ORDER — GABAPENTIN 800 MG/1
800 TABLET ORAL 4 TIMES DAILY
COMMUNITY
End: 2022-09-08 | Stop reason: SDUPTHER

## 2022-07-11 RX ORDER — HYDROCODONE BITARTRATE AND ACETAMINOPHEN 10; 325 MG/1; MG/1
1 TABLET ORAL EVERY 6 HOURS PRN
COMMUNITY
End: 2022-09-08 | Stop reason: SDUPTHER

## 2022-07-11 RX ORDER — DIAZEPAM 5 MG/1
5 TABLET ORAL 2 TIMES DAILY
Status: ON HOLD | COMMUNITY
End: 2022-07-11 | Stop reason: HOSPADM

## 2022-07-11 RX ADMIN — INSULIN LISPRO 3 UNITS: 100 INJECTION, SOLUTION INTRAVENOUS; SUBCUTANEOUS at 11:19

## 2022-07-11 RX ADMIN — SUCRALFATE 1 G: 1 TABLET ORAL at 08:11

## 2022-07-11 RX ADMIN — FOLIC ACID 1 MG: 1 TABLET ORAL at 08:53

## 2022-07-11 RX ADMIN — FUROSEMIDE 20 MG: 20 TABLET ORAL at 08:11

## 2022-07-11 RX ADMIN — PANTOPRAZOLE SODIUM 40 MG: 40 TABLET, DELAYED RELEASE ORAL at 05:30

## 2022-07-11 RX ADMIN — ATORVASTATIN CALCIUM 80 MG: 80 TABLET, FILM COATED ORAL at 08:11

## 2022-07-11 RX ADMIN — ENOXAPARIN SODIUM 40 MG: 100 INJECTION SUBCUTANEOUS at 08:10

## 2022-07-11 RX ADMIN — METHYLPREDNISOLONE SODIUM SUCCINATE 40 MG: 40 INJECTION, POWDER, FOR SOLUTION INTRAMUSCULAR; INTRAVENOUS at 08:11

## 2022-07-11 RX ADMIN — INSULIN LISPRO 1 UNITS: 100 INJECTION, SOLUTION INTRAVENOUS; SUBCUTANEOUS at 08:15

## 2022-07-11 RX ADMIN — FUROSEMIDE 20 MG: 10 INJECTION, SOLUTION INTRAMUSCULAR; INTRAVENOUS at 09:32

## 2022-07-11 RX ADMIN — PROPRANOLOL HYDROCHLORIDE 20 MG: 20 TABLET ORAL at 08:11

## 2022-07-11 RX ADMIN — SUCRALFATE 1 G: 1 TABLET ORAL at 13:47

## 2022-07-11 RX ADMIN — ASPIRIN 81 MG: 81 TABLET, CHEWABLE ORAL at 08:11

## 2022-07-11 RX ADMIN — PIPERACILLIN AND TAZOBACTAM 3375 MG: 3; .375 INJECTION, POWDER, FOR SOLUTION INTRAVENOUS at 12:23

## 2022-07-11 RX ADMIN — GUAIFENESIN 600 MG: 600 TABLET, EXTENDED RELEASE ORAL at 08:11

## 2022-07-11 RX ADMIN — CETIRIZINE HYDROCHLORIDE 10 MG: 10 TABLET, FILM COATED ORAL at 08:11

## 2022-07-11 RX ADMIN — PIPERACILLIN AND TAZOBACTAM 3375 MG: 3; .375 INJECTION, POWDER, FOR SOLUTION INTRAVENOUS at 05:30

## 2022-07-11 RX ADMIN — BUDESONIDE 500 MCG: 0.5 INHALANT RESPIRATORY (INHALATION) at 06:17

## 2022-07-11 RX ADMIN — IPRATROPIUM BROMIDE AND ALBUTEROL SULFATE 3 ML: 2.5; .5 SOLUTION RESPIRATORY (INHALATION) at 06:17

## 2022-07-11 NOTE — PROGRESS NOTES
Speech Language Pathology  Facility/Department: [facility] SURG   CLINICAL BEDSIDE SWALLOW EVALUATION    NAME: Rogerio Mitchell  : 1960  MRN: [id number]    ADMISSION DATE: 2022  ADMITTING DIAGNOSIS: has Acute encephalopathy; Aspiration pneumonia ([address] Obesity; TYLER (acute kidney injury) ([address] Hyperkalemia; Acute respiratory failure with hypoxia ([address] Type 2 diabetes mellitus ([address] Hypertension; Anxiety; Left shoulder pain; Generalized weakness; Pulmonary nodule; Personal history of tobacco use; Compression fracture of L1 lumbar vertebra ([address] Rhabdomyolysis; Recurrent falls while walking; Vitamin D deficiency; Declining functional status; Sepsis ([address] COPD exacerbation ([address] LEONEL (obstructive sleep apnea); Positive urine drug screen; Thyroid nodule; and Dysphagia on their problem list.  ONSET DATE: 2022    Recent Chest Xray/CT of Chest: (2022)  Impression       Clear lungs.       Normal cardiomediastinal silhouette.       L1 compression fracture stable since lumbar spine CT of 2021. Date of Eval: 2022  Evaluating Therapist: RIVERA Groves    Current Diet level:  Current Diet : Regular    Primary Complaint  Patient Complaint: Patient complains of choking/coughing during meals. Pain:  Pain Assessment  Pain Assessment: None - Denies Pain  Pain Level: 2  Patient's Stated Pain Goal: 0 - No pain    Reason for Referral  Rogerio Mitchell was referred for a bedside swallow evaluation to assess the efficiency of his swallow function, identify signs and symptoms of aspiration and make recommendations regarding safe dietary consistencies, effective compensatory strategies, and safe eating environment.     Impression  Dysphagia Diagnosis: Mild oral stage dysphagia;Mild to moderate pharyngeal stage dysphagia  Dysphagia Impression : Patient presents with mild oropharyngeal phase dysphagia characterized by incoordiation of bolus acceptance, decreased strenght, and suspected aspiration due to overt signs and symptoms such as coughing, throat clearing, and audible swallow on thin liquids. Dysphagia Outcome Severity Scale: Level 4: Mild moderate dysphagia- Intermittent supervision/cueing. One - two diet consistencies restricted     Treatment Plan  Requires SLP Intervention: Yes  Duration of Treatment: Outpatient-30 days  D/C Recommendations: Ongoing speech therapy is recommended at next level of care (SLP recommened outpatient speech services at this facility.)  Referral To: Dietician    Recommended Diet and Intervention  Soft and bite sized, nectar thick liquids. Skilled ST services warranted, outpatient therapy at this facility recommended. Recommended Form of Meds: Meds in puree  Therapeutic Interventions: Bolus control exercises; Pharyngeal exercises;Diet tolerance monitoring; Therapeutic PO trials with SLP;Laryngeal exercises; Vital Stim/NMES;Oral motor exercises; Patient/Family education    Compensatory Swallowing Strategies  Compensatory Swallowing Strategies : Alternate solids and liquids;Eat/Feed slowly;Upright as possible for all oral intake;Remain upright for 30-45 minutes after meals; External pacing;Small bites/sips    Treatment/Goals  Short-term Goals  Timeframe for Short-term Goals: N/A  Long-term Goals  Timeframe for Long-term Goals: N/A    General  Chart Reviewed: Yes  Subjective  Subjective: BSE completed per MD request seconday to hx of aspiration pneumonia, COPD, and hx of BSE at this facility. Patient found long sitting in bed. Patient alert, cooperative, and oriented to self and place, unoriented to time. Behavior/Cognition: Alert; Cooperative;Pleasant mood  Respiratory Status: Room air  O2 Device: None (Room air)  Communication Observation: Functional  Follows Directions: Simple  Dentition: Adequate  Patient Positioning: Upright in bed  Baseline Vocal Quality: Normal  Volitional Cough: Strong  Prior Dysphagia History: Hx of BSE at this facility on 02/2021, SLP recommended soft and bite sized with thin liquids. Consistencies Administered: Regular;Pureed;Thin;Thin - cup; Thin - straw;Mildly Thick - cup     Vision/Hearing  Vision  Vision Exceptions: Wears glasses at all times  Hearing  Hearing: Within functional limits    Oral Motor Deficits  Oral/Motor  Oral Hygiene: Moist;Clean  Gag: No Impairment    Oral Phase Dysfunction  Oral Phase  Oral Phase: Exceptions  Oral Phase  Oral Phase - Comment: Patient presented with trials of thin via cup, straw, and porvale cup, nectar thick liquids, puree, and regular consistencies. Patient presents with discoordination during propultion, decreases strength, and incoordination of lingual movements. Patient presented with decreased awareness of bolus acceptance by chugging trials of thin liquid and nectar thick liquids, and taking large bites despite multiple cues to take small bites and sips. Indicators of Pharyngeal Phase Dysfunction   Pharyngeal Phase   Pharyngeal Phase: Patient presented with trials of thin via cup, straw, and porvale cup, nectar thick liquids, puree, and regular consistencies. Patient coughed/throat cleared after trials of thin liquids via cup, straw, and provale cup. Patient presented with an audible swallow on trials of thin. SLP trialed nectar thick liquids, no overt signs or symptoms of aspiration present. No overt signs or symptoms of aspiration present on any of the consistencies trialed despite bolus size. Prognosis  Individuals consulted  Consulted and agree with results and recommendations: Patient;RN;Family member;Physician  RN Name: Terrence Clark  Patient Education: Patient and family member educated on diet modifications. SLP educated them on how to thicken liquids and sent them home with packet. SLP educated patient and family member on aspiration precautions and safe swallow protocol. SLP educated patient and family on need for continued ST services.   Patient Education Response: Verbalizes understanding;Demonstrated understanding  Safety Devices in place: Yes  Type of devices: Left in bed;Bed alarm in place;Call light within reach       Therapy Time  [address] [place], [place]  7/11/2022 4:59 PM

## 2022-07-11 NOTE — PROGRESS NOTES
Occupational Therapy  Facility/Department: [facility]  Occupational Therapy Initial Assessment    Name: Víctor Morales  : 1960  MRN: [id number]  Date of Service: 2022    Discharge Recommendations:  Home with assist PRN  OT Equipment Recommendations  Equipment Needed: No       Patient Diagnosis(es): The primary encounter diagnosis was Altered mental status, unspecified altered mental status type. A diagnosis of Pneumonia of both lungs due to infectious organism, unspecified part of lung was also pertinent to this visit. Past Medical History:  has a past medical history of Anxiety, B12 deficiency, Cancer ([facility] Chronic constipation, COPD (chronic obstructive pulmonary disease) ([facility] DDD (degenerative disc disease), lumbar, Depression, Diabetes mellitus ([facility] GERD (gastroesophageal reflux disease), Hyperlipidemia, Hypertension, and Lower back pain. Past Surgical History:  has a past surgical history that includes Cholecystectomy and Prostatectomy (2018). Assessment   Performance deficits / Impairments: Decreased cognition;Decreased high-level IADLs  Assessment: This 64year old male was referred to OT services upon admission following onset of acute encephalopathy. Pt presents seated upright in chair on room air upon entry. Pt pleasant and agreeable. Pt 02 sats >90%. Pt demo ability to don LB clothing seated in chair without difficulty. Pt come to stand with SBA ambulated 400 feet instructed to use cane for safety. Pt tolerated well maintained 02 sats >90%. Pt returned to room declined need to toilet but states he has been going by himself. No skilled OT services warranted at this time. Pt appears to be at baseline functional status.   Prognosis: Good  Decision Making: Low Complexity  REQUIRES OT FOLLOW-UP: No  Activity Tolerance  Activity Tolerance: Patient Tolerated treatment well        Plan         Restrictions  Restrictions/Precautions  Restrictions/Precautions: Fall Risk,General Precautions  Required Braces or Orthoses?: No    Subjective   General  Chart Reviewed: Yes  Patient assessed for rehabilitation services?: Yes  Family / Caregiver Present: No  Referring Practitioner: ISAURA Masters  Diagnosis: Acute encephalopathy  Subjective  Subjective: I was coughing and coughing at home. Agreeable to OT services. Social/Functional History  Social/Functional History  Lives With: Family  Type of Home: Mobile home  Home Layout: One level  Home Access: Ramped entrance  Bathroom Shower/Tub: Tub/Shower unit  Bathroom Toilet: Standard  Bathroom Accessibility: Walker accessible  Home Equipment: Cane,Walker, rolling,Wheelchair-manual  Has the patient had two or more falls in the past year or any fall with injury in the past year?: No  Receives Help From: Family  ADL Assistance: Independent  Homemaking Assistance: Independent (family assists with housekeeping tasks.)  Homemaking Responsibilities: Yes  Ambulation Assistance: Independent  Transfer Assistance: Independent  Active : No       Objective   Heart Rate: 73  Heart Rate Source: Monitor  BP: (!) 143/75  BP Location: Right upper arm  MAP (Calculated): 97.67  Resp: 18  SpO2: 92 %  O2 Device: None (Room air)          Observation/Palpation  Observation: Pt sitting upright in chair upon entry, room air, fully dressed,     Bed Mobility Training  Bed Mobility Training: No  Balance  Sitting: Intact  Standing: Intact  Transfer Training  Transfer Training: No  Gait  Overall Level of Assistance: Stand-by assistance  Distance (ft): 400 Feet  Assistive Device: Cane, straight     AROM: Within functional limits  PROM: Within functional limits  Strength:  Within functional limits  Coordination: Within functional limits  Tone: Normal  Sensation: Intact  ADL  LE Dressing: Independent  Toileting Skilled Clinical Factors: declined toileting during evaluation but reports he has been toileting independently since admission        Bed mobility  Bed Mobility Comments: received sitting upright in chair upon entry, Pt reports completing bed mobility with independence. Transfers  Stand Pivot Transfers: Stand by assistance  Sit to stand: Stand by assistance  Stand to sit: Stand by assistance     Cognition  Cognition Comment: decreased cognition noted, decreased safety awareness  Orientation  Orientation Level: Oriented to person;Oriented to situation;Oriented to place (able to recall month but confused about year)                  Education Given To: Patient  Education Provided: Fall Prevention Strategies  Education Provided Comments: use of cane for fall prevention  Education Method: Demonstration;Verbal  Barriers to Learning: Cognition  Education Outcome: Verbalized understanding;Demonstrated understanding  LUE AROM (degrees)  LUE AROM : WFL  RUE AROM (degrees)  RUE AROM : WFL    Therapy Time   Individual Concurrent Group Co-treatment   Time In 0955         Time Out 1018         Minutes 23              This note serves as a DC summary in the event of pt discharge.      Elisa Kimbrough OTR/L

## 2022-07-11 NOTE — FLOWSHEET NOTE
07/08/22 2144   Assessment   Charting Type Admission   Psychosocial   Psychosocial (WDL) X   Patient Behaviors Sleeping   Neurological   Neuro (WDL) X   Level of Consciousness Responds to voice (1)   Orientation Level Oriented X4   Cognition Follows commands   Speech Clear;Delayed responses   R Pupil Size (mm) 3   R Pupil Shape Round   R Pupil Reaction Brisk   L Pupil Size (mm) 3   L Pupil Shape Round   L Pupil Reaction Brisk   R Hand  Strong   L Hand  Strong   R Foot Dorsiflexion Strong   L Foot Dorsiflexion Strong   R Foot Plantar Flexion Strong   L Foot Plantar Flexion Strong   RUE Motor Response Normal extension;Normal flexion   LUE Motor Response Normal extension;Normal flexion   RLE Motor Response Normal extension;Normal flexion   LLE Motor Response Normal extension;Normal flexion   [place] Coma Scale   Eye Opening 3   Best Verbal Response 4   Best Motor Response 6   [place] Coma Scale Score 13   HEENT (Head, Ears, Eyes, Nose, & Throat)   HEENT (WDL) X   Right Eye Glasses   Left Eye Glasses   Teeth Missing teeth   Respiratory   Respiratory (WDL) X   Respiratory Pattern Regular   Respiratory Depth Normal   Respiratory Quality/Effort Unlabored   Chest Assessment Chest expansion symmetrical   L Breath Sounds Rhonchi   R Breath Sounds Rhonchi   Cough/Sputum   Cough None   Cardiac   Cardiac (WDL) X   Cardiac Regularity Regular   Cardiac Rhythm Sinus andria   Cardiac Monitor   Telemetry Box Number [id number]   Alarm Audible Yes   Telemetry Monitor Alarm Parameters    Gastrointestinal   Abdominal (WDL) X   RUQ Bowel Sounds Active   LUQ Bowel Sounds Active   RLQ Bowel Sounds Active   LLQ Bowel Sounds Active   Abdomen Inspection Rounded; Taut   Tenderness Nontender   Genitourinary   Genitourinary (WDL) X   Urine Frequency   Urine Frequency Yes   Genitalia   Male Genitalia Enlarged scrotum  (redness)   Peripheral Vascular   Peripheral Vascular (WDL) X   Edema Right lower extremity; Left lower
07/09/22 1023   Assessment   Charting Type Shift assessment   Psychosocial   Psychosocial (WDL) X   Patient Behaviors Sleeping   Neurological   Neuro (WDL) X   Level of Consciousness Responds to voice (1)   Orientation Level Oriented X4   Cognition Follows commands   Speech Clear;Delayed responses   [place] Coma Scale   Eye Opening 4   Best Verbal Response 5   Best Motor Response 6   [place] Coma Scale Score 15   HEENT (Head, Ears, Eyes, Nose, & Throat)   HEENT (WDL) X   Right Eye Glasses   Left Eye Glasses   Teeth Missing teeth   Respiratory   Respiratory (WDL) X   Respiratory Pattern Regular   Respiratory Depth Normal   Respiratory Quality/Effort Unlabored   Chest Assessment Chest expansion symmetrical   L Breath Sounds Rhonchi   R Breath Sounds Rhonchi   Cough/Sputum   Cough None   Cardiac   Cardiac (WDL) X   Cardiac Regularity Regular   Cardiac Rhythm Sinus andria   Gastrointestinal   Abdominal (WDL) X   RUQ Bowel Sounds Active   LUQ Bowel Sounds Active   RLQ Bowel Sounds Active   LLQ Bowel Sounds Active   Abdomen Inspection Rounded; Taut   Tenderness Nontender   Genitourinary   Genitourinary (WDL) X   Urine Frequency   Urine Frequency Yes   Genitalia   Male Genitalia Enlarged scrotum  (redness)   Peripheral Vascular   Peripheral Vascular (WDL) X   Edema Right lower extremity; Left lower extremity;Perineal   Skin Integumentary    Skin Integumentary (WDL) WDL   Care Plan - Skin/Tissue Integrity Goals   Skin Integrity Remains Intact Monitor for areas of redness and/or skin breakdown   Care Plan - Chronic Conditions and Co-Morbidity   Care Plan - Patient's Chronic Conditions and Co-Morbidity Symptoms are Monitored and Maintained or Improved Monitor and assess patient's chronic conditions and comorbid symptoms for stability, deterioration, or improvement   Care Plan - Discharge Planning Goals   Discharge to home or other facility with appropriate resources Identify barriers to discharge with patient and caregiver
07/10/22 0821   Assessment   Charting Type Shift assessment   Psychosocial   Psychosocial (WDL) X   Patient Behaviors Sleeping   Neurological   Neuro (WDL) X   Level of Consciousness Alert (0)   Orientation Level Oriented X4   Cognition Follows commands   Speech Clear;Delayed responses   [place] Coma Scale   Eye Opening 4   Best Verbal Response 5   Best Motor Response 6   [place] Coma Scale Score 15   HEENT (Head, Ears, Eyes, Nose, & Throat)   HEENT (WDL) X   Right Eye Glasses   Left Eye Glasses   Teeth Missing teeth   Respiratory   Respiratory (WDL) X   Respiratory Pattern Regular   Respiratory Depth Normal   Respiratory Quality/Effort Unlabored   Chest Assessment Chest expansion symmetrical   L Breath Sounds Rhonchi   R Breath Sounds Rhonchi   Cough/Sputum   Cough None   Cardiac   Cardiac (WDL) X   Cardiac Regularity Regular   Cardiac Rhythm Sinus andria   Gastrointestinal   Abdominal (WDL) X   RUQ Bowel Sounds Active   LUQ Bowel Sounds Active   RLQ Bowel Sounds Active   LLQ Bowel Sounds Active   Abdomen Inspection Rounded; Taut   Tenderness Nontender   Genitourinary   Genitourinary (WDL) X   Urine Frequency   Urine Frequency Yes   Genitalia   Male Genitalia Enlarged scrotum  (redness)   Peripheral Vascular   Peripheral Vascular (WDL) X   Edema Right lower extremity; Left lower extremity;Perineal   Skin Integumentary    Skin Integumentary (WDL) WDL   Care Plan - Skin/Tissue Integrity Goals   Skin Integrity Remains Intact Monitor for areas of redness and/or skin breakdown   Care Plan - Chronic Conditions and Co-Morbidity   Care Plan - Patient's Chronic Conditions and Co-Morbidity Symptoms are Monitored and Maintained or Improved Monitor and assess patient's chronic conditions and comorbid symptoms for stability, deterioration, or improvement
07/10/22 2030   Assessment   Charting Type Shift assessment   Psychosocial   Psychosocial (WDL) X   Neurological   Neuro (WDL) X   Level of Consciousness Alert (0)   Orientation Level Oriented X4   Cognition Follows commands   Speech Clear;Delayed responses   [place] Coma Scale   Eye Opening 4   Best Verbal Response 5   Best Motor Response 6   [place] Coma Scale Score 15   HEENT (Head, Ears, Eyes, Nose, & Throat)   HEENT (WDL) X   Right Eye Glasses   Left Eye Glasses   Teeth Missing teeth   Respiratory   Respiratory (WDL) X   Respiratory Pattern Regular   Respiratory Depth Normal   Respiratory Quality/Effort Unlabored   Chest Assessment Chest expansion symmetrical   L Breath Sounds Rhonchi   R Breath Sounds Rhonchi   Cough/Sputum   Cough None   Cardiac   Cardiac (WDL) X   Cardiac Regularity Regular   Cardiac Rhythm Sinus andria   Gastrointestinal   Abdominal (WDL) X   RUQ Bowel Sounds Active   LUQ Bowel Sounds Active   RLQ Bowel Sounds Active   LLQ Bowel Sounds Active   Abdomen Inspection Rounded; Taut   Tenderness Nontender   Genitourinary   Genitourinary (WDL) X  (incont at times)   Urine Frequency   Urine Frequency Yes   Genitalia   Male Genitalia Enlarged scrotum  (redness)   Peripheral Vascular   Peripheral Vascular (WDL) X   Edema Right lower extremity; Left lower extremity;Perineal   Skin Integumentary    Skin Integumentary (WDL) WDL
07/11/22 0718   Vital Signs   Temp 97.9 °F (36.6 °C)   Temp Source Oral   Heart Rate 73   Heart Rate Source Monitor   Resp 18   BP (!) 143/75   BP Location Right upper arm   MAP (Calculated) 97.67   Oxygen Therapy   SpO2 90 %   O2 Device None (Room air)
07/11/22 0900   Oxygen Therapy   SpO2 92 %   O2 Device None (Room air)
will montor

## 2022-07-11 NOTE — CARE COORDINATION
Lives With: Family  Type of Home: Mobile home  Home Layout: One level  Home Access: Ramped entrance  Bathroom Shower/Tub: Tub/Shower unit  Bathroom Toilet: Standard  Bathroom Accessibility: Walker accessible  Home Equipment: Cane,Walker, rolling,Wheelchair-manual  Has the patient had two or more falls in the past year or any fall with injury in the past year?: No  Receives Help From: Family  ADL Assistance: Independent  Homemaking Assistance: Independent (family assists with housekeeping tasks.)  Homemaking Responsibilities: Yes  Ambulation Assistance: Independent  Transfer Assistance: Independent  Active : No     Lives with family. Has cane, RW, WC, nebulizer. Independent at baseline. Needs 6 minute walk prior to DC.

## 2022-07-11 NOTE — ACP (ADVANCE CARE PLANNING)
Advance Care Planning     General Advance Care Planning (ACP) Conversation    Date of Conversation: 7/8/2022  Conducted with: Patient with Decision Making Capacity with nursing on admit    Healthcare Decision Maker:    Primary Decision Maker: Andrew Littlejohn - Niece/Nephew - [phone]    Primary Decision Maker: Carlos Choe - [phone]  Click here to complete [address] including selection of the Healthcare Decision Maker Relationship (ie \"Primary\"). Today we documented Decision Maker(s) consistent with Legal Next of Kin hierarchy.     Content/Action Overview:  Has NO ACP documents/care preferences - information provided, considering goals and options  Reviewed DNR/DNI and patient elects Full Code (Attempt Resuscitation)    Length of Voluntary ACP Conversation in minutes:  <16 minutes (Non-Billable)    Bharati Staton RN

## 2022-07-11 NOTE — DISCHARGE SUMMARY
Discharge Summary      Patient ID: John Cline      Patient's PCP: Annita Marr    Admit Date: 7/8/2022     Discharge Date:  7/11/2022    Admitting Provider: Adwoa Allred MD    Discharging Provider: ISAURA Benz     Reason for this admission:   Pneumonia     Discharge Diagnoses: Active Hospital Problems    Diagnosis Date Noted    Thyroid nodule [E04.1] 07/11/2022    Dysphagia [R13.10] 07/11/2022    LEONEL (obstructive sleep apnea) [G47.33] 07/09/2022    Positive urine drug screen [R82.5] 07/09/2022    COPD exacerbation ([address] [J44.1] 07/04/2022    Aspiration pneumonia ([address] [J69.0] 02/07/2021    Obesity [E66.9] 02/07/2021    Acute respiratory failure with hypoxia (HCC) [J96.01] 02/07/2021    Type 2 diabetes mellitus ([address] [E11.9] 02/07/2021    Hypertension [I10] 02/07/2021    Personal history of tobacco use [Z87.891] 02/07/2021    Acute encephalopathy [G93.40] 02/06/2021       Procedures:  XR CHEST (2 VW)   Final Result      Clear lungs. Normal cardiomediastinal silhouette. L1 compression fracture stable since lumbar spine CT of 2/7/2021. CT Head WO Contrast   Final Result      No acute intracranial hemorrhage or mass effect. XR CHEST PORTABLE   Final Result   1. Extensive right-sided airspace disease compatible with pneumonia. Additional patchy asymmetric airspace opacity in the left lung suggesting bilateral pneumonia. 2. Superimposed pulmonary venous congestion. Consults:   IP CONSULT TO CASE MANAGEMENT    Hospital Course:       LEONEL (obstructive sleep apnea) [G47.33]  -suspect pt has underlying sleep apnea. Would benefit from OP sleep study.   -avoid oversedation. Have recommended pt discontinue home diazepam and decrease gabapentin dose. - refer to pulmonology on dc     07/09/2022    Positive urine drug screen [R82.5]  -admission UDS + for opiates, BZDs and TCAs.    -per GUILLERMINA, last filled in May, however, pt reports having current prescriptions.    07/09/2022    Aspiration pneumonia ([facility] [J69.0]  -CXR 7/8: Extensive right-sided airspace disease compatible with pneumonia. Additional patchy asymmetric airspace opacity in the left lung suggesting bilateral pneumonia.  Superimposed pulmonary venous congestion.  -Suspect aspiration pneumonia   -Covered with Zosyn and patient with clinical improvement   -Continue budesonide, DuoNebs, Mucinex and as needed robitussin, solumedrol  -encourage IS and ambulation  -O2 sats currently stable on RA  - 7/11 patient is requesting dc home and he is stable. Alert and oriented having appropriate conversation. CXR clear today. Will complete steroid taper and antibiotic course on discharge. Per 6 mwt sats stable on RA. - evaluated by SLP on 7/11 and it was recommended he have nectar thickened liquids. Patient and family educated. Will set up for outpatient SLP if agreeable. - of note, per chart review patient with right sided pneumonia as well as loculated effusion on CT chest March 2022 and actually required transfer at that time. Planning to refer to pulmonology as above for ongoing monitoring and evaluation. Possible that patient with ongoing aspiration causing recurrent right sided pneumonia as well.      02/07/2021    Obesity [E66.9]  -BMI 65.59  -Complicates all aspects of care  -Counseled on diet and exercise after recovery    02/07/2021    Acute respiratory failure with hypoxia ([facility] [J96.01]  -suspect Secondary to oversedation causing acute encephalopathy with resultant aspiration. Patient also noted to have signs of aspiration when evaluated by SLP on 7/11.  -Admission ABG with PO2 54.2, O2 sat 83.8  -Continue treatment as above under aspiration pneumonia  -O2 sats maintained on room air 7/9  -pt wheezing on exam 7/10. Suspect possible mild COPD exacerbation. Added IV steroids 7/10.  - continue lasix      COPD exacerbation  -As above, noted to be wheezing on exam 7/10.   Suspect possible mild COPD exacerbation. Added IV Solu-Medrol 7/10. Continue budesonide and DuoNebs. - on discharge will continue home spiriva. Add symbicort.     02/07/2021    Type 2 diabetes mellitus ([facility] [E11.9]  -A1c 6.1 7/5/22   -home regimen: Jardiance, metformin, Januvia, glipizide, and nightly lantus   - per chart review no hypoglycemic episodes   -resume home regimen on dc    02/07/2021    Hypertension [I10]  -continue inderal    02/07/2021    Personal history of tobacco use [Z87.891]  -pt reports he stopped smoking 5 months ago  -encouraged continued cessation    02/07/2021    Acute encephalopathy [G93.40]  -Suspect secondary to oversedation from home BZDs, gabapentin, and opiates which were all held during hospital stay. Possible patient also taking seroquel and risperdal which were discontinued on 7/4 per chart review. He is a poor historian regarding medications. -CT head 7/8 with no acute intracranial process  -Mental status returned to normal on 7/9  - stable for dc home. Recommend he stop diazepam and decrease gabapentin dosing. Could consider EEG as outpatient with further event - defer to pcp.        · Dysphagia  - SLP Consulted and concerned that patient with signs of aspiraiton with thin liquids. Suspect this is worse in setting of suspected intellectual disability. - patient with recurrent right sided pneumonia as above   - dc with instructions for nectar thickened liquids and SLP will set up for outpatient speech therapy to continue working with patient if he is willing     · Thyroid nodule  - per chart review patient had thyroid US in May and FNA recommended- f/u with PCP for ongoing evaluation and management       Vital Signs  Temp: 97.9 °F (36.6 °C)  Heart Rate: 73  Resp: 18  BP: (!) 143/75  SpO2: 92 %  O2 Device: None (Room air)      Vital signs reviewed in electronic chart.     Physical exam  Constitutional:  Well developed, well nourished, male sitting in bedside chair in no acute Medications:      Discharge Medication List as of 7/11/2022  3:22 PM           Details   folic acid (FOLVITE) 1 MG tablet Take 1 tablet by mouth daily, Disp-30 tablet, R-3Normal      fluticasone-salmeterol (ADVAIR DISKUS) 100-50 MCG/ACT AEPB diskus inhaler Inhale 1 puff into the lungs every 12 hours, Disp-1 each, R-0Normal      amoxicillin-clavulanate (AUGMENTIN) 875-125 MG per tablet Take 1 tablet by mouth 2 times daily for 5 days, Disp-10 tablet, R-0Normal      predniSONE (DELTASONE) 10 MG tablet Take 3 tablets PO daily x 2 days, 2 tablets PO daily x 2 days, 1 tablet PO daily x 2 days, then stop, Disp-12 tablet, R-0Normal              Details   guaiFENesin (MUCINEX) 600 MG extended release tablet Take 1 tablet by mouth 2 times daily for 7 days, Disp-14 tablet, R-0Normal              Details   HYDROcodone-acetaminophen (NORCO)  MG per tablet Take 1 tablet by mouth every 6 hours as needed for Pain. Last filled 5/27/22Historical Med      gabapentin (NEURONTIN) 800 MG tablet Take 800 mg by mouth 4 times daily. Historical Med      ipratropium-albuterol (DUONEB) 0.5-2.5 (3) MG/3ML SOLN nebulizer solution Inhale 3 mLs into the lungs every 4 hours (while awake), Disp-360 mL, R-0Normal      fexofenadine (ALLEGRA) 180 MG tablet Take 180 mg by mouth dailyHistorical Med      rOPINIRole (REQUIP) 2 MG tablet Take 2 mg by mouth nightly Historical Med      furosemide (LASIX) 20 MG tablet Take 20 mg by mouth dailyHistorical Med      insulin detemir (LEVEMIR FLEXTOUCH) 100 UNIT/ML injection pen Inject 5 Units into the skin nightlyHistorical Med      albuterol sulfate HFA (PROAIR HFA) 108 (90 Base) MCG/ACT inhaler Inhale 2 puffs into the lungs every 6 hours as needed for WheezingHistorical Med      tiotropium (SPIRIVA HANDIHALER) 18 MCG inhalation capsule Inhale 18 mcg into the lungs dailyHistorical Med      cyanocobalamin 1000 MCG tablet Take 1,000 mcg by mouth dailyHistorical Med      propranolol (INDERAL) 20 MG tablet Take 1 tablet by mouth 2 times daily, Disp-90 tablet, R-3Normal      diclofenac sodium (VOLTAREN) 1 % GEL Apply 2 g topically 2 times daily, Topical, 2 TIMES DAILY Starting Wed 2/17/2021, Until Fri 3/19/2021, For 30 days, Disp-50 g, R-0, Normal      vitamin D (ERGOCALCIFEROL) 1.25 MG (25935 UT) CAPS capsule Take 1 capsule by mouth once a week, Disp-5 capsule, R-0Normal      divalproex (DEPAKOTE ER) 250 MG extended release tablet Take 250 mg by mouth nightlyHistorical Med      sucralfate (CARAFATE) 1 GM tablet Take 1 g by mouth 3 times dailyHistorical Med      metFORMIN (GLUCOPHAGE) 1000 MG tablet Take 1,000 mg by mouth 2 times daily (with meals)Historical Med      SITagliptin (JANUVIA) 100 MG tablet Take 100 mg by mouth dailyHistorical Med      pantoprazole sodium (PROTONIX) 40 MG PACK packet Take 40 mg by mouth every morning (before breakfast)Historical Med      glipiZIDE (GLUCOTROL) 10 MG tablet Take 10 mg by mouth 2 times daily (before meals)Historical Med      empagliflozin (JARDIANCE) 10 MG tablet Take 10 mg by mouth dailyHistorical Med      aspirin 81 MG tablet Take 81 mg by mouth dailyHistorical Med      fenofibrate 160 MG tablet Take 160 mg by mouth daily As of 7/5/2022 could not find fill history but patient said he should be getting a new prescription on Friday. Left on list per [facility] -Shelbie MenjivarHistorical Med      sertraline (ZOLOFT) 100 MG tablet Take 100 mg by mouth dailyHistorical Med      atorvastatin (LIPITOR) 80 MG tablet Take 80 mg by mouth dailyHistorical Med            Patient was seen and examined with Dr. Tanmay Andino. After reviewing patient data and diagnostic testing the plan of care was established in conjunction with Dr. Tanmay Andino. Signed:  Electronically signed by ISAURA Olvera on 7/11/2022 at 12:52 PM       Thank you David Sherwood for the opportunity to be involved in this patient's care. If you have any questions or concerns please feel free to contact me at [phone].

## 2022-07-11 NOTE — PROGRESS NOTES
Physical Therapy  Facility/Department: [facility]  Physical Therapy Initial Assessment/Discharge Summary    Name: Leo Martin  : 1960  MRN: [id number]  Date of Service: 2022    Discharge Recommendations:  Home with assist PRN          Patient Diagnosis(es): The primary encounter diagnosis was Altered mental status, unspecified altered mental status type. A diagnosis of Pneumonia of both lungs due to infectious organism, unspecified part of lung was also pertinent to this visit. Past Medical History:  has a past medical history of Anxiety, B12 deficiency, Cancer ([facility] Chronic constipation, COPD (chronic obstructive pulmonary disease) ([facility] DDD (degenerative disc disease), lumbar, Depression, Diabetes mellitus ([facility] GERD (gastroesophageal reflux disease), Hyperlipidemia, Hypertension, and Lower back pain. Past Surgical History:  has a past surgical history that includes Cholecystectomy and Prostatectomy (2018). Assessment   Assessment: PT eval completed. Patient received sitting up in bedside chair but states he is able to perform bed mobility independently. Sit to stand and transfers with SBA. Initially ambulated without AD but was noted to reach out for objects/wall for balance. Able to ambulate 400' with straight cane and SBA with much improved balance and safety. Patient does not presents with any skilled PT needs identified and so does not require continued skilled PT intervention.   Therapy Prognosis: Excellent  Decision Making: Low Complexity  Requires PT Follow-Up: No  Activity Tolerance  Activity Tolerance: Patient tolerated treatment well;Patient tolerated evaluation without incident     Plan   Plan  Plan: Discharge with evaluation only  Safety Devices  Type of Devices: Call light within reach,Left in chair     Restrictions  Restrictions/Precautions  Restrictions/Precautions: Fall Risk,General Precautions  Required Braces or Orthoses?: No     Subjective   Pain: No c/o pain.  General  Chart Reviewed: Yes  Patient assessed for rehabilitation services?: Yes  Family / Caregiver Present: No  Referring Practitioner: ISAURA Murillo  Follows Commands: Within Functional Limits         Social/Functional History  Social/Functional History  Lives With: Family  Type of Home: Mobile home  Home Layout: One level  Home Access: Ramped entrance  Bathroom Shower/Tub: Tub/Shower unit  Bathroom Toilet: Standard  Bathroom Accessibility: Walker accessible  Home Equipment: Cane,Walker, rolling,Wheelchair-manual  Has the patient had two or more falls in the past year or any fall with injury in the past year?: No  Receives Help From: Family  ADL Assistance: Independent  Homemaking Assistance: Independent (family assists with housekeeping tasks.)  Homemaking Responsibilities: Yes  Ambulation Assistance: Independent  Transfer Assistance: Independent  Active : No  Vision/Hearing  Vision  Vision: Impaired  Vision Exceptions: Wears glasses at all times  Hearing  Hearing: Within functional limits    Cognition   Orientation  Orientation Level: Oriented to person;Oriented to situation;Oriented to place  Cognition  Overall Cognitive Status: WFL  Cognition Comment: decreased cognition noted, decreased safety awareness     Objective   Heart Rate: 73  Heart Rate Source: Monitor  BP: (!) 143/75  BP Location: Right upper arm  MAP (Calculated): 97.67  Resp: 18  SpO2: 92 %  O2 Device: None (Room air)     Observation/Palpation  Observation: Pt sitting upright in chair upon entry, room air, fully dressed,  Gross Assessment  AROM: Within functional limits  PROM: Within functional limits  Coordination: Within functional limits                 Bed Mobility Training  Bed Mobility Training: No  Balance  Sitting: Intact  Standing: Intact  Transfer Training  Transfer Training: No  Gait  Overall Level of Assistance: Stand-by assistance  Distance (ft): 400 Feet  Assistive Device: Cane, straight  Bed mobility  Bed Mobility Comments: received sitting upright in chair upon entry, Pt reports completing bed mobility with independence. Transfers  Sit to Stand: Stand by assistance  Stand to sit: Stand by assistance  Bed to Chair: Stand by assistance  Ambulation  Surface: level tile  Device: Single point cane  Assistance: Supervision  Gait Deviations: Slow Brenda; Increased NAZARIO  Distance: 400'  Comments: Initially began ambulating without AD but was noted to reach for objects/wall for balance; trial of straight cane showed improved balance and safety     Balance  Posture: Good  Sitting - Static: Good  Sitting - Dynamic: Good  Standing - Static: Good  Standing - Dynamic: Good                  Therapy Time   Individual Concurrent Group Co-treatment   Time In 0955         Time Out 1018         Minutes [address] [facility] [place], PT

## 2022-07-11 NOTE — PROGRESS NOTES
Pt discharged home- Called Guardian-Pt has no PCP at this time- Guardian will call back with number so we are able to make him a follow up appointment- Reviewed discharge instructions with pt- Will review with guardian when he arrives-IV removed cath tip intact gauze and tape applied -Pt awaiting guardian to arrive no acute distress noted

## 2022-07-11 NOTE — PROGRESS NOTES
6 MINUTE WALK TEST    Exercise type: With walker, no assistance. .      O2 sat RA/O2LPM RR EN HR BP   Rest 93 RA 18 0 86    1 min 94 RA  0 89    2 min 95 RA  0 85    3 min 95 RA  0 86    4 min 95 RA 20 0 82    5 min 96 RA  0 86    6 min 96 RA  0 87    Recovery 96 RA 18 0 81      Distance walked: 600 ft     Breath sounds/patterns: cbs    Comments: No problems .

## 2022-07-11 NOTE — PROGRESS NOTES
Family Member- Oliverio Saldivar made a follow up appointment for pt with Louis Jaimes for July 18th @10:00 # [phone]

## 2022-07-11 NOTE — PLAN OF CARE
Problem: Safety - Adult  Goal: Free from fall injury  7/11/2022 0915 by Joana Dove RN  Outcome: Progressing  7/10/2022 2229 by Bakari Rhoades RN  Outcome: Progressing     Problem: Chronic Conditions and Co-morbidities  Goal: Patient's chronic conditions and co-morbidity symptoms are monitored and maintained or improved  Outcome: Progressing

## 2022-07-11 NOTE — DISCHARGE INSTR - DIET

## 2022-07-11 NOTE — PROGRESS NOTES
Marilee Vargas arrived and discharge instructions discussed - Family member aware of [place] Thick Diet -Speech in room to discuss new diet plan- reviewed all discharge instructions with pt/family - Family member aware of what medications to start stop and continue - Family member aware to make sure pt is taking medications as directed- Family member aware to  new medications at pt pharmacy - All questions answered-Family member aware of follow up appointment- All questions answered-Pt left via ambulating in hallway with family member- No acute distress noted -All items taken and accounted for- Nectar thick packets given for drinks

## 2022-07-12 LAB
BLOOD CULTURE, ROUTINE: NORMAL
CULTURE, BLOOD 2: NORMAL

## 2022-09-08 ENCOUNTER — OFFICE VISIT (OUTPATIENT)
Dept: FAMILY MEDICINE CLINIC | Age: 62
End: 2022-09-08
Payer: MEDICAID

## 2022-09-08 ENCOUNTER — HOSPITAL ENCOUNTER (OUTPATIENT)
Facility: HOSPITAL | Age: 62
Discharge: HOME | End: 2022-09-08
Payer: MEDICAID

## 2022-09-08 VITALS
HEART RATE: 64 BPM | BODY MASS INDEX: 41.17 KG/M2 | DIASTOLIC BLOOD PRESSURE: 80 MMHG | SYSTOLIC BLOOD PRESSURE: 121 MMHG | WEIGHT: 232.4 LBS | OXYGEN SATURATION: 92 % | RESPIRATION RATE: 18 BRPM

## 2022-09-08 DIAGNOSIS — M54.40 CHRONIC LOW BACK PAIN WITH SCIATICA, SCIATICA LATERALITY UNSPECIFIED, UNSPECIFIED BACK PAIN LATERALITY: ICD-10-CM

## 2022-09-08 DIAGNOSIS — E04.1 THYROID NODULE: ICD-10-CM

## 2022-09-08 DIAGNOSIS — G89.29 CHRONIC LOW BACK PAIN WITH SCIATICA, SCIATICA LATERALITY UNSPECIFIED, UNSPECIFIED BACK PAIN LATERALITY: ICD-10-CM

## 2022-09-08 DIAGNOSIS — Z76.89 ESTABLISHING CARE WITH NEW DOCTOR, ENCOUNTER FOR: Primary | ICD-10-CM

## 2022-09-08 DIAGNOSIS — M62.830 MUSCLE SPASM OF BACK: ICD-10-CM

## 2022-09-08 DIAGNOSIS — M54.40 MIDLINE LOW BACK PAIN WITH SCIATICA, SCIATICA LATERALITY UNSPECIFIED, UNSPECIFIED CHRONICITY: ICD-10-CM

## 2022-09-08 PROCEDURE — 83036 HEMOGLOBIN GLYCOSYLATED A1C: CPT

## 2022-09-08 PROCEDURE — 85025 COMPLETE CBC W/AUTO DIFF WBC: CPT

## 2022-09-08 PROCEDURE — 84153 ASSAY OF PSA TOTAL: CPT

## 2022-09-08 PROCEDURE — 80053 COMPREHEN METABOLIC PANEL: CPT

## 2022-09-08 PROCEDURE — 84443 ASSAY THYROID STIM HORMONE: CPT

## 2022-09-08 PROCEDURE — 99204 OFFICE O/P NEW MOD 45 MIN: CPT | Performed by: NURSE PRACTITIONER

## 2022-09-08 PROCEDURE — 99491 CHRNC CARE MGMT PHYS 1ST 30: CPT | Performed by: NURSE PRACTITIONER

## 2022-09-08 PROCEDURE — 3017F COLORECTAL CA SCREEN DOC REV: CPT | Performed by: NURSE PRACTITIONER

## 2022-09-08 PROCEDURE — 4004F PT TOBACCO SCREEN RCVD TLK: CPT | Performed by: NURSE PRACTITIONER

## 2022-09-08 PROCEDURE — 80061 LIPID PANEL: CPT

## 2022-09-08 PROCEDURE — G8427 DOCREV CUR MEDS BY ELIG CLIN: HCPCS | Performed by: NURSE PRACTITIONER

## 2022-09-08 PROCEDURE — G8419 CALC BMI OUT NRM PARAM NOF/U: HCPCS | Performed by: NURSE PRACTITIONER

## 2022-09-08 RX ORDER — LEVOCETIRIZINE DIHYDROCHLORIDE 5 MG/1
5 TABLET, FILM COATED ORAL NIGHTLY
Qty: 30 TABLET | Refills: 0 | Status: SHIPPED | OUTPATIENT
Start: 2022-09-08 | End: 2022-10-12 | Stop reason: SDUPTHER

## 2022-09-08 RX ORDER — SUCRALFATE 1 G/1
1 TABLET ORAL 3 TIMES DAILY
Qty: 90 TABLET | Refills: 0 | Status: SHIPPED | OUTPATIENT
Start: 2022-09-08 | End: 2022-10-12 | Stop reason: SDUPTHER

## 2022-09-08 RX ORDER — RISPERIDONE 0.5 MG/1
TABLET, FILM COATED ORAL
COMMUNITY
Start: 2022-07-25 | End: 2022-09-08 | Stop reason: SDUPTHER

## 2022-09-08 RX ORDER — ATORVASTATIN CALCIUM 80 MG/1
80 TABLET, FILM COATED ORAL DAILY
Qty: 30 TABLET | Refills: 0 | Status: SHIPPED | OUTPATIENT
Start: 2022-09-08 | End: 2022-10-12 | Stop reason: SDUPTHER

## 2022-09-08 RX ORDER — ROPINIROLE 2 MG/1
2 TABLET, FILM COATED ORAL NIGHTLY
Qty: 30 TABLET | Refills: 0 | Status: SHIPPED | OUTPATIENT
Start: 2022-09-08 | End: 2022-10-12 | Stop reason: SDUPTHER

## 2022-09-08 RX ORDER — FUROSEMIDE 20 MG/1
20 TABLET ORAL DAILY
Qty: 30 TABLET | Refills: 0 | Status: SHIPPED | OUTPATIENT
Start: 2022-09-08 | End: 2022-10-12 | Stop reason: SDUPTHER

## 2022-09-08 RX ORDER — GABAPENTIN 800 MG/1
800 TABLET ORAL 4 TIMES DAILY
Qty: 120 TABLET | Refills: 0 | Status: SHIPPED | OUTPATIENT
Start: 2022-09-08 | End: 2022-10-12 | Stop reason: SDUPTHER

## 2022-09-08 RX ORDER — DIAZEPAM 5 MG/1
5 TABLET ORAL 2 TIMES DAILY
COMMUNITY
Start: 2022-08-18 | End: 2022-09-08 | Stop reason: ALTCHOICE

## 2022-09-08 RX ORDER — CYCLOBENZAPRINE HCL 5 MG
5 TABLET ORAL 2 TIMES DAILY PRN
Qty: 10 TABLET | Refills: 0 | Status: SHIPPED | OUTPATIENT
Start: 2022-09-08 | End: 2022-10-12 | Stop reason: SDUPTHER

## 2022-09-08 RX ORDER — GLIPIZIDE 10 MG/1
10 TABLET ORAL
Qty: 60 TABLET | Refills: 0 | Status: SHIPPED | OUTPATIENT
Start: 2022-09-08 | End: 2022-10-12 | Stop reason: SDUPTHER

## 2022-09-08 RX ORDER — PANTOPRAZOLE SODIUM 40 MG/1
40 GRANULE, DELAYED RELEASE ORAL
Qty: 30 EACH | Refills: 0 | Status: SHIPPED | OUTPATIENT
Start: 2022-09-08 | End: 2022-10-12 | Stop reason: SDUPTHER

## 2022-09-08 RX ORDER — QUETIAPINE FUMARATE 100 MG/1
100 TABLET, FILM COATED ORAL DAILY
Qty: 60 TABLET | Refills: 0 | Status: SHIPPED | OUTPATIENT
Start: 2022-09-08 | End: 2022-10-12 | Stop reason: SDUPTHER

## 2022-09-08 RX ORDER — DIVALPROEX SODIUM 250 MG/1
250 TABLET, EXTENDED RELEASE ORAL NIGHTLY
Qty: 30 TABLET | Refills: 0 | Status: SHIPPED | OUTPATIENT
Start: 2022-09-08 | End: 2022-10-12 | Stop reason: SDUPTHER

## 2022-09-08 RX ORDER — RISPERIDONE 0.5 MG/1
0.5 TABLET, FILM COATED ORAL DAILY
Qty: 30 TABLET | Refills: 0 | Status: SHIPPED | OUTPATIENT
Start: 2022-09-08 | End: 2022-10-12 | Stop reason: SDUPTHER

## 2022-09-08 RX ORDER — HYDROCODONE BITARTRATE AND ACETAMINOPHEN 10; 325 MG/1; MG/1
1 TABLET ORAL EVERY 6 HOURS PRN
Qty: 120 TABLET | Refills: 0 | Status: SHIPPED | OUTPATIENT
Start: 2022-09-08 | End: 2022-10-12 | Stop reason: SDUPTHER

## 2022-09-08 RX ORDER — QUETIAPINE FUMARATE 100 MG/1
100 TABLET, FILM COATED ORAL DAILY
COMMUNITY
Start: 2022-08-18 | End: 2022-09-08 | Stop reason: SDUPTHER

## 2022-09-08 ASSESSMENT — PATIENT HEALTH QUESTIONNAIRE - PHQ9
SUM OF ALL RESPONSES TO PHQ QUESTIONS 1-9: 0
SUM OF ALL RESPONSES TO PHQ9 QUESTIONS 1 & 2: 0
2. FEELING DOWN, DEPRESSED OR HOPELESS: 0
SUM OF ALL RESPONSES TO PHQ QUESTIONS 1-9: 0
1. LITTLE INTEREST OR PLEASURE IN DOING THINGS: 0
SUM OF ALL RESPONSES TO PHQ QUESTIONS 1-9: 0
SUM OF ALL RESPONSES TO PHQ QUESTIONS 1-9: 0

## 2022-09-08 ASSESSMENT — ENCOUNTER SYMPTOMS
NAUSEA: 0
COUGH: 0
VOMITING: 0
ABDOMINAL DISTENTION: 0
DIARRHEA: 0
BOWEL INCONTINENCE: 0
BACK PAIN: 1
ABDOMINAL PAIN: 0
WHEEZING: 0
SHORTNESS OF BREATH: 0
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
## 2022-09-09 LAB
A/G RATIO: 2.1 (ref 0.8–2)
ALBUMIN SERPL-MCNC: 4.5 G/DL (ref 3.4–4.8)
ALP BLD-CCNC: 70 U/L (ref 25–100)
ALT SERPL-CCNC: 19 U/L (ref 4–36)
ANION GAP SERPL CALCULATED.3IONS-SCNC: 12 MMOL/L (ref 3–16)
AST SERPL-CCNC: 13 U/L (ref 8–33)
BASOPHILS ABSOLUTE: 0 K/UL (ref 0–0.1)
BASOPHILS RELATIVE PERCENT: 0.3 %
BILIRUB SERPL-MCNC: 0.3 MG/DL (ref 0.3–1.2)
BUN BLDV-MCNC: 12 MG/DL (ref 6–20)
CALCIUM SERPL-MCNC: 9.6 MG/DL (ref 8.5–10.5)
CHLORIDE BLD-SCNC: 96 MMOL/L (ref 98–107)
CHOLESTEROL, TOTAL: 143 MG/DL (ref 0–200)
CO2: 27 MMOL/L (ref 20–30)
CREAT SERPL-MCNC: 0.8 MG/DL (ref 0.4–1.2)
EOSINOPHILS ABSOLUTE: 0.2 K/UL (ref 0–0.4)
EOSINOPHILS RELATIVE PERCENT: 2.1 %
GFR AFRICAN AMERICAN: >59
GFR NON-AFRICAN AMERICAN: >59
GLOBULIN: 2.1 G/DL
GLUCOSE BLD-MCNC: 69 MG/DL (ref 74–106)
HBA1C MFR BLD: 5.5 %
HCT VFR BLD CALC: 42.3 % (ref 40–54)
HDLC SERPL-MCNC: 28 MG/DL (ref 40–60)
HEMOGLOBIN: 13.4 G/DL (ref 13–18)
IMMATURE GRANULOCYTES #: 0 K/UL
IMMATURE GRANULOCYTES %: 0.3 % (ref 0–5)
LDL CHOLESTEROL CALCULATED: 71 MG/DL
LYMPHOCYTES ABSOLUTE: 2.1 K/UL (ref 1.5–4)
LYMPHOCYTES RELATIVE PERCENT: 28.6 %
MCH RBC QN AUTO: 29.1 PG (ref 27–32)
MCHC RBC AUTO-ENTMCNC: 31.7 G/DL (ref 31–35)
MCV RBC AUTO: 92 FL (ref 80–100)
MONOCYTES ABSOLUTE: 0.4 K/UL (ref 0.2–0.8)
MONOCYTES RELATIVE PERCENT: 5.9 %
NEUTROPHILS ABSOLUTE: 4.6 K/UL (ref 2–7.5)
NEUTROPHILS RELATIVE PERCENT: 62.8 %
PDW BLD-RTO: 14.8 % (ref 11–16)
PLATELET # BLD: 320 K/UL (ref 150–400)
PMV BLD AUTO: 9.1 FL (ref 6–10)
POTASSIUM SERPL-SCNC: 4.7 MMOL/L (ref 3.4–5.1)
PROSTATE SPECIFIC ANTIGEN: <0.01 NG/ML (ref 0–4)
RBC # BLD: 4.6 M/UL (ref 4.5–6)
SODIUM BLD-SCNC: 135 MMOL/L (ref 136–145)
TOTAL PROTEIN: 6.6 G/DL (ref 6.4–8.3)
TRIGL SERPL-MCNC: 221 MG/DL (ref 0–249)
TSH REFLEX: 1.48 UIU/ML (ref 0.27–4.2)
VLDLC SERPL CALC-MCNC: 44 MG/DL
WBC # BLD: 7.3 K/UL (ref 4–11)

## 2022-10-10 ENCOUNTER — HOSPITAL ENCOUNTER (OUTPATIENT)
Dept: MRI IMAGING | Facility: HOSPITAL | Age: 62
Discharge: HOME | End: 2022-10-10

## 2022-10-10 DIAGNOSIS — M54.40 CHRONIC LOW BACK PAIN WITH SCIATICA, SCIATICA LATERALITY UNSPECIFIED, UNSPECIFIED BACK PAIN LATERALITY: ICD-10-CM

## 2022-10-10 DIAGNOSIS — M54.40 MIDLINE LOW BACK PAIN WITH SCIATICA, SCIATICA LATERALITY UNSPECIFIED, UNSPECIFIED CHRONICITY: ICD-10-CM

## 2022-10-10 DIAGNOSIS — G89.29 CHRONIC LOW BACK PAIN WITH SCIATICA, SCIATICA LATERALITY UNSPECIFIED, UNSPECIFIED BACK PAIN LATERALITY: ICD-10-CM

## 2022-10-11 DIAGNOSIS — M54.40 MIDLINE LOW BACK PAIN WITH SCIATICA, SCIATICA LATERALITY UNSPECIFIED, UNSPECIFIED CHRONICITY: ICD-10-CM

## 2022-10-11 DIAGNOSIS — M54.40 CHRONIC LOW BACK PAIN WITH SCIATICA, SCIATICA LATERALITY UNSPECIFIED, UNSPECIFIED BACK PAIN LATERALITY: ICD-10-CM

## 2022-10-11 DIAGNOSIS — G89.29 CHRONIC LOW BACK PAIN WITH SCIATICA, SCIATICA LATERALITY UNSPECIFIED, UNSPECIFIED BACK PAIN LATERALITY: ICD-10-CM

## 2022-10-11 RX ORDER — HYDROCODONE BITARTRATE AND ACETAMINOPHEN 10; 325 MG/1; MG/1
TABLET ORAL
Qty: 120 TABLET | Refills: 0 | OUTPATIENT
Start: 2022-10-11

## 2022-10-11 RX ORDER — GABAPENTIN 800 MG/1
800 TABLET ORAL 4 TIMES DAILY
Qty: 120 TABLET | Refills: 0 | OUTPATIENT
Start: 2022-10-11 | End: 2022-11-10

## 2022-10-12 ENCOUNTER — OFFICE VISIT (OUTPATIENT)
Dept: FAMILY MEDICINE CLINIC | Age: 62
End: 2022-10-12
Payer: MEDICAID

## 2022-10-12 VITALS
OXYGEN SATURATION: 98 % | DIASTOLIC BLOOD PRESSURE: 81 MMHG | BODY MASS INDEX: 40.78 KG/M2 | HEIGHT: 63 IN | WEIGHT: 230.13 LBS | RESPIRATION RATE: 20 BRPM | HEART RATE: 71 BPM | SYSTOLIC BLOOD PRESSURE: 138 MMHG | TEMPERATURE: 97.4 F

## 2022-10-12 DIAGNOSIS — G89.29 CHRONIC LOW BACK PAIN WITH SCIATICA, SCIATICA LATERALITY UNSPECIFIED, UNSPECIFIED BACK PAIN LATERALITY: ICD-10-CM

## 2022-10-12 DIAGNOSIS — E87.5 HYPERKALEMIA: ICD-10-CM

## 2022-10-12 DIAGNOSIS — R69 TAKING MEDICATION FOR CHRONIC DISEASE: ICD-10-CM

## 2022-10-12 DIAGNOSIS — Z76.89 ESTABLISHING CARE WITH NEW DOCTOR, ENCOUNTER FOR: ICD-10-CM

## 2022-10-12 DIAGNOSIS — M54.40 CHRONIC LOW BACK PAIN WITH SCIATICA, SCIATICA LATERALITY UNSPECIFIED, UNSPECIFIED BACK PAIN LATERALITY: ICD-10-CM

## 2022-10-12 DIAGNOSIS — M54.40 MIDLINE LOW BACK PAIN WITH SCIATICA, SCIATICA LATERALITY UNSPECIFIED, UNSPECIFIED CHRONICITY: ICD-10-CM

## 2022-10-12 DIAGNOSIS — F41.9 ANXIETY: ICD-10-CM

## 2022-10-12 DIAGNOSIS — J30.2 CHRONIC SEASONAL ALLERGIC RHINITIS: Primary | ICD-10-CM

## 2022-10-12 DIAGNOSIS — M62.830 MUSCLE SPASM OF BACK: ICD-10-CM

## 2022-10-12 PROCEDURE — 4004F PT TOBACCO SCREEN RCVD TLK: CPT | Performed by: NURSE PRACTITIONER

## 2022-10-12 PROCEDURE — 3017F COLORECTAL CA SCREEN DOC REV: CPT | Performed by: NURSE PRACTITIONER

## 2022-10-12 PROCEDURE — G8417 CALC BMI ABV UP PARAM F/U: HCPCS | Performed by: NURSE PRACTITIONER

## 2022-10-12 PROCEDURE — G8484 FLU IMMUNIZE NO ADMIN: HCPCS | Performed by: NURSE PRACTITIONER

## 2022-10-12 PROCEDURE — 99214 OFFICE O/P EST MOD 30 MIN: CPT | Performed by: NURSE PRACTITIONER

## 2022-10-12 PROCEDURE — G8427 DOCREV CUR MEDS BY ELIG CLIN: HCPCS | Performed by: NURSE PRACTITIONER

## 2022-10-12 RX ORDER — GABAPENTIN 800 MG/1
800 TABLET ORAL 4 TIMES DAILY
Qty: 120 TABLET | Refills: 0 | Status: SHIPPED | OUTPATIENT
Start: 2022-10-12 | End: 2022-11-11

## 2022-10-12 RX ORDER — HYDROCODONE BITARTRATE AND ACETAMINOPHEN 10; 325 MG/1; MG/1
1 TABLET ORAL EVERY 6 HOURS PRN
Qty: 120 TABLET | Refills: 0 | Status: SHIPPED | OUTPATIENT
Start: 2022-10-12 | End: 2022-11-11

## 2022-10-12 RX ORDER — LOPERAMIDE HYDROCHLORIDE 2 MG/1
2 CAPSULE ORAL 4 TIMES DAILY PRN
Qty: 40 CAPSULE | Refills: 2 | Status: SHIPPED | OUTPATIENT
Start: 2022-10-12 | End: 2022-10-22

## 2022-10-12 RX ORDER — LEVOCETIRIZINE DIHYDROCHLORIDE 5 MG/1
5 TABLET, FILM COATED ORAL NIGHTLY
Qty: 30 TABLET | Refills: 2 | Status: SHIPPED | OUTPATIENT
Start: 2022-10-12

## 2022-10-12 RX ORDER — CYCLOBENZAPRINE HCL 5 MG
5 TABLET ORAL 2 TIMES DAILY PRN
Qty: 20 TABLET | Refills: 0 | Status: SHIPPED | OUTPATIENT
Start: 2022-10-12 | End: 2022-10-22

## 2022-10-12 RX ORDER — SUCRALFATE 1 G/1
1 TABLET ORAL 3 TIMES DAILY
Qty: 90 TABLET | Refills: 2 | Status: SHIPPED | OUTPATIENT
Start: 2022-10-12

## 2022-10-12 RX ORDER — QUETIAPINE FUMARATE 100 MG/1
100 TABLET, FILM COATED ORAL DAILY
Qty: 60 TABLET | Refills: 2 | Status: SHIPPED | OUTPATIENT
Start: 2022-10-12

## 2022-10-12 RX ORDER — GLIPIZIDE 10 MG/1
10 TABLET ORAL
Qty: 60 TABLET | Refills: 2 | Status: SHIPPED | OUTPATIENT
Start: 2022-10-12

## 2022-10-12 RX ORDER — RISPERIDONE 0.5 MG/1
0.5 TABLET, FILM COATED ORAL DAILY
Qty: 30 TABLET | Refills: 2 | Status: SHIPPED | OUTPATIENT
Start: 2022-10-12

## 2022-10-12 RX ORDER — FUROSEMIDE 20 MG/1
20 TABLET ORAL DAILY
Qty: 30 TABLET | Refills: 2 | Status: SHIPPED | OUTPATIENT
Start: 2022-10-12

## 2022-10-12 RX ORDER — PANTOPRAZOLE SODIUM 40 MG/1
40 GRANULE, DELAYED RELEASE ORAL
Qty: 30 EACH | Refills: 2 | Status: SHIPPED | OUTPATIENT
Start: 2022-10-12

## 2022-10-12 RX ORDER — ROPINIROLE 2 MG/1
2 TABLET, FILM COATED ORAL NIGHTLY
Qty: 30 TABLET | Refills: 2 | Status: SHIPPED | OUTPATIENT
Start: 2022-10-12

## 2022-10-12 RX ORDER — ATORVASTATIN CALCIUM 80 MG/1
80 TABLET, FILM COATED ORAL DAILY
Qty: 30 TABLET | Refills: 2 | Status: SHIPPED | OUTPATIENT
Start: 2022-10-12

## 2022-10-12 RX ORDER — DIVALPROEX SODIUM 250 MG/1
250 TABLET, EXTENDED RELEASE ORAL NIGHTLY
Qty: 30 TABLET | Refills: 2 | Status: SHIPPED | OUTPATIENT
Start: 2022-10-12

## 2022-10-12 SDOH — ECONOMIC STABILITY: FOOD INSECURITY: WITHIN THE PAST 12 MONTHS, YOU WORRIED THAT YOUR FOOD WOULD RUN OUT BEFORE YOU GOT MONEY TO BUY MORE.: NEVER TRUE

## 2022-10-12 SDOH — ECONOMIC STABILITY: FOOD INSECURITY: WITHIN THE PAST 12 MONTHS, THE FOOD YOU BOUGHT JUST DIDN'T LAST AND YOU DIDN'T HAVE MONEY TO GET MORE.: NEVER TRUE

## 2022-10-12 ASSESSMENT — SOCIAL DETERMINANTS OF HEALTH (SDOH): HOW HARD IS IT FOR YOU TO PAY FOR THE VERY BASICS LIKE FOOD, HOUSING, MEDICAL CARE, AND HEATING?: NOT HARD AT ALL

## 2022-10-12 ASSESSMENT — ENCOUNTER SYMPTOMS
BACK PAIN: 1
BOWEL INCONTINENCE: 1

## 2022-10-12 NOTE — PROGRESS NOTES
SUBJECTIVE:    Jose Grove is a 58 y.o. male    Patient here today for chronic med refills. Patient states his pain is being managed pretty well. States he is having some pain in his legs today, states he does have weakness in his legs that causes him to fall occasionally. States he has walker and cane at home but he does not typically use them. States he had an incident 3 nights ago where he hit his head on the doorframe but states he turned too quickly but did not fall. States his anxiety is being controlled. He does not like crowds and states that if he does have to go out in crowded areas his klonopin does help him to function. Patient states he has been having diarrhea and that has been going for the last 2 months and caregiver states that is because he has been eating food that is not good for him, very greasy foods. Also states that his blood sugar has been running higher than usual, this AM it was 145. Has hx of prostate cancer states he does urinate a lot and he is incontinent of bladder at times. Back Pain  This is a chronic problem. The current episode started more than 1 year ago. The problem occurs constantly. The problem is unchanged. Pain location: bilateral legs. The pain is at a severity of 3/10. The pain is mild. The pain is Worse during the day. The symptoms are aggravated by sitting and standing. Associated symptoms include bowel incontinence (started 2 months ago) and weakness. Anxiety        Diabetes  Associated symptoms include weakness. Chief Complaint   Patient presents with    Medication Refill    Follow-up    Back Pain    Anxiety    Diabetes        Review of Systems   Gastrointestinal:  Positive for bowel incontinence (started 2 months ago). Musculoskeletal:  Positive for back pain. Neurological:  Positive for weakness.       OBJECTIVE:    /81 (Site: Right Lower Arm, Position: Sitting, Cuff Size: Medium Adult)   Pulse 71   Temp 97.4 °F (36.3 °C) (Infrared)   Resp 20   Ht 5' 3\" (1.6 m)   Wt 230 lb 2 oz (104.4 kg)   SpO2 98% Comment: RA  BMI 40.76 kg/m²    Physical Exam  Constitutional:       General: He is not in acute distress. Appearance: He is obese. He is not ill-appearing. Cardiovascular:      Rate and Rhythm: Normal rate and regular rhythm. Pulmonary:      Effort: Pulmonary effort is normal. No respiratory distress. Breath sounds: Normal breath sounds. No wheezing or rales. Abdominal:      Tenderness: There is no abdominal tenderness. Comments: Bowel sounds hyperactive x 4     Musculoskeletal:      Right lower leg: Edema (2+) present. Left lower leg: Edema (3+) present. Skin:     General: Skin is warm and dry. Findings: Bruising (noted to left forehead at hairline, states from hitting door frame 3 days ago) present. Neurological:      Mental Status: He is alert. Psychiatric:         Mood and Affect: Mood normal.         Behavior: Behavior normal.         Thought Content: Thought content normal.         Judgment: Judgment normal.       ASSESSMENT/PLAN:    1. Chronic seasonal allergic rhinitis  -     levocetirizine (XYZAL) 5 MG tablet; Take 1 tablet by mouth nightly, Disp-30 tablet, R-2Normal  2. Midline low back pain with sciatica, sciatica laterality unspecified, unspecified chronicity  -     gabapentin (NEURONTIN) 800 MG tablet; Take 1 tablet by mouth 4 times daily for 30 days. , Disp-120 tablet, R-0Normal  -     HYDROcodone-acetaminophen (NORCO)  MG per tablet; Take 1 tablet by mouth every 6 hours as needed for Pain for up to 30 days. Last filled 5/27/22, Disp-120 tablet, R-0Normal  3. Chronic low back pain with sciatica, sciatica laterality unspecified, unspecified back pain laterality  -     gabapentin (NEURONTIN) 800 MG tablet; Take 1 tablet by mouth 4 times daily for 30 days. , Disp-120 tablet, R-0Normal  -     HYDROcodone-acetaminophen (NORCO)  MG per tablet;  Take 1 tablet by mouth every 6 hours as needed for Pain for up to 30 days. Last filled 5/27/22, Disp-120 tablet, R-0Normal  4. Establishing care with new doctor, encounter for  5. Muscle spasm of back  -     cyclobenzaprine (FLEXERIL) 5 MG tablet; Take 1 tablet by mouth 2 times daily as needed for Muscle spasms, Disp-20 tablet, R-0Normal  6. Hyperkalemia  7. Taking medication for chronic disease  -     QUEtiapine (SEROQUEL) 100 MG tablet; Take 1 tablet by mouth daily, Disp-60 tablet, R-2Normal  -     sertraline (ZOLOFT) 50 MG tablet; Take 1 tablet by mouth daily, Disp-30 tablet, R-2Normal  -     risperiDONE (RISPERDAL) 0.5 MG tablet; Take 1 tablet by mouth daily, Disp-30 tablet, R-2Normal  -     rOPINIRole (REQUIP) 2 MG tablet; Take 1 tablet by mouth nightly, Disp-30 tablet, R-2Normal  -     furosemide (LASIX) 20 MG tablet; Take 1 tablet by mouth daily, Disp-30 tablet, R-2Normal  -     divalproex (DEPAKOTE ER) 250 MG extended release tablet; Take 1 tablet by mouth nightly, Disp-30 tablet, R-2Normal  -     sucralfate (CARAFATE) 1 GM tablet; Take 1 tablet by mouth 3 times daily, Disp-90 tablet, R-2Normal  -     metFORMIN (GLUCOPHAGE) 1000 MG tablet; Take 1 tablet by mouth 2 times daily (with meals), Disp-60 tablet, R-2Normal  -     SITagliptin (JANUVIA) 100 MG tablet; Take 1 tablet by mouth daily, Disp-30 tablet, R-2Normal  -     pantoprazole sodium (PROTONIX) 40 MG PACK packet; Take 1 packet by mouth every morning (before breakfast), Disp-30 each, R-2Normal  -     glipiZIDE (GLUCOTROL) 10 MG tablet; Take 1 tablet by mouth 2 times daily (before meals), Disp-60 tablet, R-2Normal  -     atorvastatin (LIPITOR) 80 MG tablet; Take 1 tablet by mouth daily, Disp-30 tablet, R-2Normal  8. Anxiety  -     QUEtiapine (SEROQUEL) 100 MG tablet; Take 1 tablet by mouth daily, Disp-60 tablet, R-2Normal  -     sertraline (ZOLOFT) 50 MG tablet;  Take 1 tablet by mouth daily, Disp-30 tablet, R-2Normal   Spoke with patients previous PCP who prescribed hydrocodone and gabapentin. She works in same facility as Chris Snyder who is prescribing Valium, which patient did not disclose but I discovered on Tim Azar. She is going to discuss with her  if patient can be seen there for psych and have other PCP. Patient has been referred to pain management by them and they are working on scheduling. Will discuss with patient that he cannot continue to receive pain management from PCP and does need to see them for chronic medications. Tim Doe completed, patient has received controlled substance from 3 providers in last 3 months. Made Sissy Rhoades aware of situation. Will no longer give patient controlled medications. Spoke with [facility] where patient gets medication filled and all medication that I sent today were also sent in the last month by Chris Snyder. Prior to Visit Medications    Medication Sig Taking?  Authorizing Provider   QUEtiapine (SEROQUEL) 100 MG tablet Take 1 tablet by mouth daily Yes GABBI Vegas CNP   sertraline (ZOLOFT) 50 MG tablet Take 1 tablet by mouth daily Yes GABBI Vegas CNP   risperiDONE (RISPERDAL) 0.5 MG tablet Take 1 tablet by mouth daily Yes GABBI Vegas CNP   levocetirizine (XYZAL) 5 MG tablet Take 1 tablet by mouth nightly Yes GABBI Vegas CNP   rOPINIRole (REQUIP) 2 MG tablet Take 1 tablet by mouth nightly Yes GABBI Vegas CNP   furosemide (LASIX) 20 MG tablet Take 1 tablet by mouth daily Yes GABBI Vegas CNP   divalproex (DEPAKOTE ER) 250 MG extended release tablet Take 1 tablet by mouth nightly Yes GABBI Vegas CNP   sucralfate (CARAFATE) 1 GM tablet Take 1 tablet by mouth 3 times daily Yes GABBI Vegas CNP   metFORMIN (GLUCOPHAGE) 1000 MG tablet Take 1 tablet by mouth 2 times daily (with meals) Yes GABBI Vegas CNP   SITagliptin (JANUVIA) 100 MG tablet Take 1 tablet by mouth daily Yes GABBI Vegas CNP   pantoprazole sodium (PROTONIX) 40 MG PACK packet Take 1 packet by mouth every morning (before breakfast) Yes GABBI Membreno CNP   glipiZIDE (GLUCOTROL) 10 MG tablet Take 1 tablet by mouth 2 times daily (before meals) Yes GABBI Membreno CNP   atorvastatin (LIPITOR) 80 MG tablet Take 1 tablet by mouth daily Yes GABBI Membreno CNP   vitamin D (ERGOCALCIFEROL) 1.25 MG (95917 UT) CAPS capsule Take 1 capsule by mouth once a week Yes ISAURA Costello   empagliflozin (JARDIANCE) 10 MG tablet Take 10 mg by mouth daily Yes Historical Provider, MD   aspirin 81 MG tablet Take 81 mg by mouth daily Yes Historical Provider, MD   gabapentin (NEURONTIN) 800 MG tablet Take 1 tablet by mouth 4 times daily for 30 days.   GABBI Membreno CNP   folic acid (FOLVITE) 1 MG tablet Take 1 tablet by mouth daily  Patient not taking: No sig reported  ISAURA Rossi   fluticasone-salmeterol (ADVAIR DISKUS) 100-50 MCG/ACT AEPB diskus inhaler Inhale 1 puff into the lungs every 12 hours  Patient not taking: No sig reported  ISAURA Rossi   ipratropium-albuterol (DUONEB) 0.5-2.5 (3) MG/3ML SOLN nebulizer solution Inhale 3 mLs into the lungs every 4 hours (while awake)  Patient not taking: No sig reported  ISAURA Dey   insulin detemir (LEVEMIR FLEXTOUCH) 100 UNIT/ML injection pen Inject 5 Units into the skin nightly  Patient not taking: No sig reported  Historical Provider, MD   albuterol sulfate HFA (PROVENTIL;VENTOLIN;PROAIR) 108 (90 Base) MCG/ACT inhaler Inhale 2 puffs into the lungs every 6 hours as needed for Wheezing  Patient not taking: No sig reported  Historical Provider, MD   tiotropium (SPIRIVA HANDIHALER) 18 MCG inhalation capsule Inhale 18 mcg into the lungs daily  Patient not taking: No sig reported  Historical Provider, MD   cyanocobalamin 1000 MCG tablet Take 1,000 mcg by mouth daily  Patient not taking: Reported on 10/12/2022  Historical Provider, MD   diclofenac sodium (VOLTAREN) 1 % GEL Apply 2 g topically 2 times daily  ISAURA Dey

## 2022-10-12 NOTE — PROGRESS NOTES
Chief Complaint   Patient presents with    Medication Refill    Follow-up    Back Pain    Anxiety    Diabetes     Patient to the clinic for a monthly follow up on his anxiety, pain management and diabetes. Patient also is wanting to discuss his referral to pain management he states they cannot afford to travel out of the area. Patient is also due for a colonoscopy and would like to get that scheduled at [facility] if possible. Have you seen any other physician or provider since your last visit no    Have you had any other diagnostic tests since your last visit? no    Have you changed or stopped any medications since your last visit? no     I have recommended that this patient have a flu shot but he declines at this time. I have discussed the risks and benefits of this examination with him. The patient verbalizes understanding. Diabetic retinal exam completed this year?  No                       * If yes please have patient sign a records release to obtain record to update Health Maintenance                       * If no, please order referral for patient to be scheduled

## 2022-12-15 RX ORDER — ASPIRIN 81 MG/1
TABLET, COATED ORAL
Qty: 30 TABLET | Refills: 2 | OUTPATIENT
Start: 2022-12-15

## 2022-12-15 RX ORDER — EMPAGLIFLOZIN 10 MG/1
TABLET, FILM COATED ORAL
Qty: 30 TABLET | Refills: 2 | OUTPATIENT
Start: 2022-12-15

## 2022-12-20 RX ORDER — EMPAGLIFLOZIN 10 MG/1
TABLET, FILM COATED ORAL
Qty: 30 TABLET | Refills: 2 | OUTPATIENT
Start: 2022-12-20

## 2022-12-21 RX ORDER — LANCETS 30 GAUGE
EACH MISCELLANEOUS
Refills: 11 | OUTPATIENT
Start: 2022-12-21

## 2023-01-09 DIAGNOSIS — J30.2 CHRONIC SEASONAL ALLERGIC RHINITIS: ICD-10-CM

## 2023-01-09 DIAGNOSIS — R69 TAKING MEDICATION FOR CHRONIC DISEASE: ICD-10-CM

## 2023-01-09 RX ORDER — RISPERIDONE 0.5 MG/1
0.5 TABLET ORAL DAILY
Qty: 30 TABLET | Refills: 2 | OUTPATIENT
Start: 2023-01-09

## 2023-01-09 RX ORDER — LEVOCETIRIZINE DIHYDROCHLORIDE 5 MG/1
TABLET, FILM COATED ORAL
Qty: 30 TABLET | Refills: 2 | OUTPATIENT
Start: 2023-01-09

## 2023-02-23 DIAGNOSIS — R69 TAKING MEDICATION FOR CHRONIC DISEASE: ICD-10-CM

## 2023-02-23 RX ORDER — DIVALPROEX SODIUM 250 MG/1
TABLET, EXTENDED RELEASE ORAL
Qty: 30 TABLET | Refills: 2 | OUTPATIENT
Start: 2023-02-23

## 2023-07-20 ENCOUNTER — TELEPHONE (OUTPATIENT)
Dept: PRIMARY CARE CLINIC | Age: 63
End: 2023-07-20